# Patient Record
Sex: MALE | Race: WHITE | NOT HISPANIC OR LATINO | Employment: FULL TIME | URBAN - METROPOLITAN AREA
[De-identification: names, ages, dates, MRNs, and addresses within clinical notes are randomized per-mention and may not be internally consistent; named-entity substitution may affect disease eponyms.]

---

## 2017-05-15 ENCOUNTER — ALLSCRIPTS OFFICE VISIT (OUTPATIENT)
Dept: OTHER | Facility: OTHER | Age: 52
End: 2017-05-15

## 2017-10-16 ENCOUNTER — GENERIC CONVERSION - ENCOUNTER (OUTPATIENT)
Dept: OTHER | Facility: OTHER | Age: 52
End: 2017-10-16

## 2017-11-20 ENCOUNTER — GENERIC CONVERSION - ENCOUNTER (OUTPATIENT)
Dept: OTHER | Facility: OTHER | Age: 52
End: 2017-11-20

## 2017-11-29 ENCOUNTER — ALLSCRIPTS OFFICE VISIT (OUTPATIENT)
Dept: OTHER | Facility: OTHER | Age: 52
End: 2017-11-29

## 2017-11-30 NOTE — PROGRESS NOTES
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) (C44 310)   3  Esophageal reflux (530 81) (K21 9)   4  History of allergy (V15 09) (Z88 9)    Plan  Esophageal reflux    · Esomeprazole Magnesium 40 MG Oral Capsule Delayed Release (NexIUM);take 1 capsule daily  History of allergy    · From  Allegra 180 MG TABS  To HM Fexofenadine HCl - 180 MG Oral TabletTAKE 1 TABLET DAILY    Discussion/Summary  Surgical Clearance: He is at a LOW risk from a cardiovascular standpoint at this time without any additional cardiac testing  Reevaluation needed, if he should present with symptoms prior to surgery/procedure  Patient at low risk for cardiopulmonary event  Okay to proceed with surgery  Form completed  Chief Complaint  pt presents for a Codementor  pt reports having surg on 12/6/2016  pt reports doing well  History of Present Illness  Pre-Op Visit (Brief): The patient is being seen for a preoperative visit  Surgical Risk Assessment:  Prior Anesthesia: He had prior anesthesia-- and-- no prior adverse reaction to general anesthesia  Exercise Capacity: able to walk four blocks without symptoms-- and-- able to walk two flights of stairs without symptoms  Lifestyle Factors: denies tobacco use and denies illegal drug use  Symptoms: no easy bleeding,-- no easy bruising,-- no frequent nosebleeds,-- no chest pain,-- no cough,-- no dyspnea,-- no edema,-- no palpitations-- and-- no wheezing  Living Situation: home is secure and supportive and no post-op concerns with his living situation  HPI: Patient is here for preop clearance requested by Dr Vasquez Jersey 0 for basal cell cancer left nasal bridge near lower eyelid  This will be performed on the 6th of December  Patient otherwise feeling fine  Review of Systems   Constitutional: No fever or chills, feels well, no tiredness, no recent weight gain or weight loss    Eyes: No complaints of eye pain, no red eyes, no discharge from eyes, no itchy eyes  ENT: no complaints of earache, no hearing loss, no nosebleeds, no nasal discharge, no sore throat, no hoarseness  Cardiovascular: No complaints of slow heart rate, no fast heart rate, no chest pain, no palpitations, no leg claudication, no lower extremity  Respiratory: No complaints of shortness of breath, no wheezing, no cough, no SOB on exertion, no orthopnea or PND  Gastrointestinal: No complaints of abdominal pain, no constipation, no nausea or vomiting, no diarrhea or bloody stools  Genitourinary: No complaints of dysuria, no incontinence, no hesitancy, no nocturia, no genital lesion, no testicular pain  Musculoskeletal: No complaints of arthralgia, no myalgias, no joint swelling or stiffness, no limb pain or swelling  Integumentary: as noted in HPI  Neurological: No compliants of headache, no confusion, no convulsions, no numbness or tingling, no dizziness or fainting, no limb weakness, no difficulty walking  Psychiatric: Is not suicidal, no sleep disturbances, no anxiety or depression, no change in personality, no emotional problems  Endocrine: No complaints of proptosis, no hot flashes, no muscle weakness, no erectile dysfunction, no deepening of the voice, no feelings of weakness  Hematologic/Lymphatic: No complaints of swollen glands, no swollen glands in the neck, does not bleed easily, no easy bruising  Active Problems  1  Acute bronchitis (466 0) (J20 9)   2  Acute sinusitis (461 9) (J01 90)   3  Cervical pain (neck) (723 1) (M54 2)   4  Esophageal reflux (530 81) (K21 9)   5  History of allergy (V15 09) (Z88 9)   6  Hyperlipidemia (272 4) (E78 5)   7  Skin rash (782 1) (R21)   8  Upper respiratory infection (465 9) (J06 9)   9  Vitamin D deficiency (268 9) (E55 9)    Past Medical History   · History of Colon, diverticulosis (562 10) (K57 30)    The active problems and past medical history were reviewed and updated today        Surgical History   · History of Hemorrhoidectomy    The surgical history was reviewed and updated today  Family History  Family History    · Family history of Cancer    The family history was reviewed and updated today  Social History     · Being A Social Drinker   · Former smoker (W12 48) (R92 040)  The social history was reviewed and updated today  The social history was reviewed and is unchanged  Current Meds   1  Allegra 180 MG TABS; Therapy: (Recorded:16Jan2013) to Recorded   2  Esomeprazole Magnesium 40 MG Oral Capsule Delayed Release; take 1 capsule daily; Therapy: 25BQK0593 to (Evaluate:43Gxf6026)  Requested for: 18KSZ5612; Last Rx:01Nov2016 Ordered   3  Viagra 100 MG Oral Tablet; TAKE 1 TABLET DAILY 1 HOUR BEFORE NEEDED; Therapy: 74ABB4875 to (Evaluate:20Apr2016)  Requested for: 75LVS4499; Last Rx:31Mar2016 Ordered   4  Vitamin D 1000 UNIT Oral Tablet; Therapy: (Recorded:16Jan2013) to Recorded    The medication list was reviewed and updated today  Allergies  1  Lamisil CREA    Vitals   Recorded: 84VXV8144 03:46PM   Temperature 97 F   Heart Rate 78   Respiration 14   Systolic 824, LUE, Sitting   Diastolic 70, LUE, Sitting   Height 5 ft 10 in   Weight 227 lb    BMI Calculated 32 57   BSA Calculated 2 2       Physical Exam   Constitutional  General appearance: No acute distress, well appearing and well nourished  Eyes  Conjunctiva and lids: No swelling, erythema, or discharge  Pupils and irises: Equal, round and reactive to light  Ears, Nose, Mouth, and Throat  External inspection of ears and nose: Normal    Otoscopic examination: Tympanic membrance translucent with normal light reflex  Canals patent without erythema  Oropharynx: Normal with no erythema, edema, exudate or lesions  Pulmonary  Respiratory effort: No increased work of breathing or signs of respiratory distress  Auscultation of lungs: Clear to auscultation  Cardiovascular  Palpation of heart: Normal PMI, no thrills     Auscultation of heart: Normal rate and rhythm, normal S1 and S2, without murmurs  Examination of extremities for edema and/or varicosities: Normal    Abdomen  Abdomen: Non-tender, no masses  Liver and spleen: No hepatomegaly or splenomegaly  Lymphatic  Palpation of lymph nodes in neck: No lymphadenopathy  Musculoskeletal  Gait and station: Normal    Digits and nails: Normal without clubbing or cyanosis  Inspection/palpation of joints, bones, and muscles: Normal    Skin  Skin and subcutaneous tissue: Abnormal  -- Status post biopsy left nasal bridge/face near lower eyelid medial aspect  Neurologic  Cranial nerves: Cranial nerves 2-12 intact  Reflexes: 2+ and symmetric  Sensation: No sensory loss  Psychiatric  Orientation to person, place and time: Normal    Mood and affect: Normal        Results/Data  PHQ-2 Adult Depression Screening 29Nov2017 03:50PM User, s     Test Name Result Flag Reference   PHQ-2 Adult Depression Score 0       Over the last two weeks, how often have you been bothered by any of the following problems? Little interest or pleasure in doing things: Not at all - 0 Feeling down, depressed, or hopeless: Not at all - 0   PHQ-2 Adult Depression Screening Negative         End of Encounter Meds    1  Esomeprazole Magnesium 40 MG Oral Capsule Delayed Release (NexIUM); take 1 capsule daily; Therapy: 59WBQ9972 to (Evaluate:28Jan2018)  Requested for: 08ENS3012; Last Rx:29Nov2017 Ordered    2  Viagra 100 MG Oral Tablet; TAKE 1 TABLET DAILY 1 HOUR BEFORE NEEDED; Therapy: 50CFN5273 to (Evaluate:20Apr2016)  Requested for: 22CMK5130; Last Rx:31Mar2016 Ordered    3  HM Fexofenadine HCl - 180 MG Oral Tablet; TAKE 1 TABLET DAILY; Last Rx:29Nov2017 Ordered    4  Vitamin D 1000 UNIT Oral Tablet;  Therapy: (Recorded:16Jan2013) to Recorded    Signatures   Electronically signed by : Vida Sprague DO; Nov 29 2017  4:28PM EST                       (Author)

## 2017-12-13 ENCOUNTER — GENERIC CONVERSION - ENCOUNTER (OUTPATIENT)
Dept: FAMILY MEDICINE CLINIC | Facility: CLINIC | Age: 52
End: 2017-12-13

## 2018-01-08 ENCOUNTER — ALLSCRIPTS OFFICE VISIT (OUTPATIENT)
Dept: OTHER | Facility: OTHER | Age: 53
End: 2018-01-08

## 2018-01-09 NOTE — PROGRESS NOTES
Assessment   1  URTI (acute upper respiratory infection) (465 9) (J06 9)   2  Acute bronchitis (466 0) (J20 9)    Plan   Acute bronchitis, URTI (acute upper respiratory infection)    · Azithromycin 250 MG Oral Tablet; TAKE 2 TABLETS ON DAY 1 THEN TAKE 1    TABLET A DAY FOR 4 DAYS   · Cheratussin -10 MG/5ML Oral Syrup; TAKE 5 ML EVERY 4 HOURS AS    NEEDED   · Follow Up if Not Better Evaluation and Treatment  Follow-up  Status: Complete  Done:    04IKD2212 04:27PM    Chief Complaint   pt states he has a upper respiratory infection with coughing, shortness of breath, and chest burning  History of Present Illness   HPI: Patient here with cough, shortness of breath and chest discomfort x1 day  Patient with sick contact of wife  No nausea vomiting  Mild phlegm  Mild rhinorrhea / nasal congestion  No sore throat  no fever chills noticed  no treatment  Review of Systems        Constitutional: no fever or chills, feels well, no tiredness, no recent weight loss or weight gain  ENT: as noted in HPI  Cardiovascular: no complaints of slow or fast heart rate, no chest pain, no palpitations, no leg claudication or lower extremity edema  Respiratory: as noted in HPI  Gastrointestinal: no complaints of abdominal pain, no constipation, no nausea or vomiting, no diarrhea or bloody stools  Genitourinary: no complaints of dysuria or incontinence, no hesitancy, no nocturia, no genital lesion, no inadequacy of penile erection  Musculoskeletal: no complaints of arthralgia, no myalgia, no joint swelling or stiffness, no limb pain or swelling  Integumentary: as noted in HPI  Neurological: no complaints of headache, no confusion, no numbness or tingling, no dizziness or fainting  Active Problems   1  Acute bronchitis (466 0) (J20 9)   2  Acute sinusitis (461 9) (J01 90)   3  Cervical pain (neck) (723 1) (M54 2)   4  Esophageal reflux (530 81) (K21 9)   5   Facial basal cell cancer (173 31) (C44 310)   6  History of allergy (V15 09) (Z88 9)   7  Hyperlipidemia (272 4) (E78 5)   8  Preop examination (V72 84) (Z01 818)   9  Skin rash (782 1) (R21)   10  URTI (acute upper respiratory infection) (465 9) (J06 9)   11  Vitamin D deficiency (268 9) (E55 9)    Past Medical History   1  History of Colon, diverticulosis (562 10) (K57 30)  Active Problems And Past Medical History Reviewed: The active problems and past medical history were reviewed and updated today  Family History   Mother    1  No pertinent family history  Family History    2  Family history of Cancer    Social History    · Being A Social Drinker   · Former smoker (S43 88) (J59 296)    Surgical History   1  History of Hemorrhoidectomy    Current Meds    1  Esomeprazole Magnesium 40 MG Oral Capsule Delayed Release; take 1 capsule daily; Therapy: 61UMA7736 to (Evaluate:28Jan2018)  Requested for: 89OTT9860; Last     Rx:29Nov2017 Ordered   2  HM Fexofenadine HCl - 180 MG Oral Tablet; TAKE 1 TABLET DAILY; Last Rx:29Nov2017     Ordered   3  Viagra 100 MG Oral Tablet; TAKE 1 TABLET DAILY 1 HOUR BEFORE NEEDED; Therapy: 93JXU9676 to (Evaluate:20Apr2016)  Requested for: 77OVI9905; Last     Rx:31Mar2016 Ordered   4  Vitamin D 1000 UNIT Oral Tablet; Therapy: (Recorded:16Jan2013) to Recorded     The medication list was reviewed and updated today  Allergies   1  Lamisil CREA    Vitals    Recorded: 28SSC8381 77:80FZ   Systolic 669   Diastolic 90   Height 5 ft 10 in   Weight 229 lb 0 2 oz   BMI Calculated 32 86   BSA Calculated 2 21     Physical Exam        Constitutional      General appearance: No acute distress, well appearing and well nourished  Eyes      Conjunctiva and lids: No swelling, erythema, or discharge  Pupils and irises: Equal, round and reactive to light         Ears, Nose, Mouth, and Throat      External inspection of ears and nose: Normal        Otoscopic examination: Tympanic membrance translucent with normal light reflex  Canals patent without erythema  Nasal mucosa, septum, and turbinates: Normal without edema or erythema  Oropharynx: Abnormal  -- Red, postnasal drip  Pulmonary      Respiratory effort: No increased work of breathing or signs of respiratory distress  Auscultation of lungs: Clear to auscultation, equal breath sounds bilaterally, no wheezes, no rales, no rhonci  Cardiovascular      Palpation of heart: Normal PMI, no thrills  Auscultation of heart: Normal rate and rhythm, normal S1 and S2, without murmurs  Examination of extremities for edema and/or varicosities: Normal        Carotid pulses: Normal        Lymphatic      Palpation of lymph nodes in neck: No lymphadenopathy  Musculoskeletal      Gait and station: Normal        Digits and nails: Normal without clubbing or cyanosis  Inspection/palpation of joints, bones, and muscles: Normal        Skin      Skin and subcutaneous tissue: Abnormal  -- rash b/l axilla        Psychiatric      Orientation to person, place and time: Normal        Mood and affect: Normal           Signatures    Electronically signed by : Jh Goins DO; Jan 8 2018  4:27PM EST                       (Author)

## 2018-01-11 NOTE — CONSULTS
Chief Complaint  pt presents for a med clear  pt reports having surg on 12/6/2016  pt reports doing well  History of Present Illness  Pre-Op Visit (Brief): The patient is being seen for a preoperative visit  Surgical Risk Assessment:   Prior Anesthesia: He had prior anesthesia and no prior adverse reaction to general anesthesia  Exercise Capacity: able to walk four blocks without symptoms and able to walk two flights of stairs without symptoms  Lifestyle Factors: denies tobacco use and denies illegal drug use  Symptoms: no easy bleeding, no easy bruising, no frequent nosebleeds, no chest pain, no cough, no dyspnea, no edema, no palpitations and no wheezing  Living Situation: home is secure and supportive and no post-op concerns with his living situation  HPI: Patient is here for preop clearance requested by Dr Aleja Ovalles 0 for basal cell cancer left nasal bridge near lower eyelid  This will be performed on the 6th of December  Patient otherwise feeling fine  Review of Systems    Constitutional: No fever or chills, feels well, no tiredness, no recent weight gain or weight loss  Eyes: No complaints of eye pain, no red eyes, no discharge from eyes, no itchy eyes  ENT: no complaints of earache, no hearing loss, no nosebleeds, no nasal discharge, no sore throat, no hoarseness  Cardiovascular: No complaints of slow heart rate, no fast heart rate, no chest pain, no palpitations, no leg claudication, no lower extremity  Respiratory: No complaints of shortness of breath, no wheezing, no cough, no SOB on exertion, no orthopnea or PND  Gastrointestinal: No complaints of abdominal pain, no constipation, no nausea or vomiting, no diarrhea or bloody stools  Genitourinary: No complaints of dysuria, no incontinence, no hesitancy, no nocturia, no genital lesion, no testicular pain  Musculoskeletal: No complaints of arthralgia, no myalgias, no joint swelling or stiffness, no limb pain or swelling  Integumentary: as noted in HPI  Neurological: No compliants of headache, no confusion, no convulsions, no numbness or tingling, no dizziness or fainting, no limb weakness, no difficulty walking  Psychiatric: Is not suicidal, no sleep disturbances, no anxiety or depression, no change in personality, no emotional problems  Endocrine: No complaints of proptosis, no hot flashes, no muscle weakness, no erectile dysfunction, no deepening of the voice, no feelings of weakness  Hematologic/Lymphatic: No complaints of swollen glands, no swollen glands in the neck, does not bleed easily, no easy bruising  Active Problems    1  Acute bronchitis (466 0) (J20 9)   2  Acute sinusitis (461 9) (J01 90)   3  Cervical pain (neck) (723 1) (M54 2)   4  Esophageal reflux (530 81) (K21 9)   5  History of allergy (V15 09) (Z88 9)   6  Hyperlipidemia (272 4) (E78 5)   7  Skin rash (782 1) (R21)   8  Upper respiratory infection (465 9) (J06 9)   9  Vitamin D deficiency (268 9) (E55 9)    Past Medical History    · History of Colon, diverticulosis (562 10) (K57 30)    The active problems and past medical history were reviewed and updated today  Surgical History    · History of Hemorrhoidectomy    The surgical history was reviewed and updated today  Family History    · Family history of Cancer    The family history was reviewed and updated today  Social History    · Being A Social Drinker   · Former smoker (Y18 83) (U50 832)  The social history was reviewed and updated today  The social history was reviewed and is unchanged  Current Meds   1  Allegra 180 MG TABS; Therapy: (Recorded:16Jan2013) to Recorded   2  Esomeprazole Magnesium 40 MG Oral Capsule Delayed Release; take 1 capsule daily; Therapy: 94NJR9818 to (Evaluate:73Fpn8949)  Requested for: 00BVV4987; Last   Rx:01Nov2016 Ordered   3  Viagra 100 MG Oral Tablet; TAKE 1 TABLET DAILY 1 HOUR BEFORE NEEDED;    Therapy: 84KOA3958 to (Evaluate:20Apr2016) Requested for: 13DUI4746; Last   Rx:31Mar2016 Ordered   4  Vitamin D 1000 UNIT Oral Tablet; Therapy: (Recorded:16Jan2013) to Recorded    The medication list was reviewed and updated today  Allergies    1  Lamisil CREA    Vitals  Signs    Temperature: 97 F  Heart Rate: 78  Respiration: 14  Systolic: 965, LUE, Sitting  Diastolic: 70, LUE, Sitting  Height: 5 ft 10 in  Weight: 227 lb   BMI Calculated: 32 57  BSA Calculated: 2 2    Physical Exam    Constitutional   General appearance: No acute distress, well appearing and well nourished  Eyes   Conjunctiva and lids: No swelling, erythema, or discharge  Pupils and irises: Equal, round and reactive to light  Ears, Nose, Mouth, and Throat   External inspection of ears and nose: Normal     Otoscopic examination: Tympanic membrance translucent with normal light reflex  Canals patent without erythema  Oropharynx: Normal with no erythema, edema, exudate or lesions  Pulmonary   Respiratory effort: No increased work of breathing or signs of respiratory distress  Auscultation of lungs: Clear to auscultation  Cardiovascular   Palpation of heart: Normal PMI, no thrills  Auscultation of heart: Normal rate and rhythm, normal S1 and S2, without murmurs  Examination of extremities for edema and/or varicosities: Normal     Abdomen   Abdomen: Non-tender, no masses  Liver and spleen: No hepatomegaly or splenomegaly  Lymphatic   Palpation of lymph nodes in neck: No lymphadenopathy  Musculoskeletal   Gait and station: Normal     Digits and nails: Normal without clubbing or cyanosis  Inspection/palpation of joints, bones, and muscles: Normal     Skin   Skin and subcutaneous tissue: Abnormal   Status post biopsy left nasal bridge/face near lower eyelid medial aspect  Neurologic   Cranial nerves: Cranial nerves 2-12 intact  Reflexes: 2+ and symmetric  Sensation: No sensory loss      Psychiatric   Orientation to person, place and time: Normal  Mood and affect: Normal        Results/Data  PHQ-2 Adult Depression Screening 29Nov2017 03:50PM User, Georgie     Test Name Result Flag Reference   PHQ-2 Adult Depression Score 0     Over the last two weeks, how often have you been bothered by any of the following problems? 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) (C44 310)   3  Esophageal reflux (530 81) (K21 9)   4  History of allergy (V15 09) (Z88 9)    Plan  Esophageal reflux    · Esomeprazole Magnesium 40 MG Oral Capsule Delayed Release (NexIUM);  take 1 capsule daily  History of allergy    · From  Allegra 180 MG TABS  To HM Fexofenadine HCl - 180 MG Oral Tablet  TAKE 1 TABLET DAILY    Discussion/Summary  Surgical Clearance: He is at a LOW risk from a cardiovascular standpoint at this time without any additional cardiac testing  Reevaluation needed, if he should present with symptoms prior to surgery/procedure  Patient at low risk for cardiopulmonary event  Okay to proceed with surgery  Form completed  End of Encounter Meds    1  Esomeprazole Magnesium 40 MG Oral Capsule Delayed Release (NexIUM); take 1   capsule daily; Therapy: 41DZY1408 to (Evaluate:28Jan2018)  Requested for: 23ORK5552; Last   Rx:29Nov2017 Ordered    2  Viagra 100 MG Oral Tablet; TAKE 1 TABLET DAILY 1 HOUR BEFORE NEEDED; Therapy: 08DSK3000 to (Evaluate:20Apr2016)  Requested for: 24SDU3161; Last   Rx:31Mar2016 Ordered    3  HM Fexofenadine HCl - 180 MG Oral Tablet; TAKE 1 TABLET DAILY; Last Rx:29Nov2017   Ordered    4  Vitamin D 1000 UNIT Oral Tablet;    Therapy: (Recorded:16Jan2013) to Recorded    Signatures   Electronically signed by : Ever Payton DO; Nov 29 2017  4:28PM EST                       (Author)

## 2018-01-14 VITALS
HEIGHT: 70 IN | HEART RATE: 78 BPM | RESPIRATION RATE: 14 BRPM | TEMPERATURE: 97 F | WEIGHT: 227 LBS | DIASTOLIC BLOOD PRESSURE: 70 MMHG | SYSTOLIC BLOOD PRESSURE: 128 MMHG | BODY MASS INDEX: 32.5 KG/M2

## 2018-01-15 VITALS
WEIGHT: 235 LBS | DIASTOLIC BLOOD PRESSURE: 80 MMHG | BODY MASS INDEX: 33.64 KG/M2 | HEIGHT: 70 IN | SYSTOLIC BLOOD PRESSURE: 120 MMHG

## 2018-01-23 VITALS
DIASTOLIC BLOOD PRESSURE: 90 MMHG | WEIGHT: 229.01 LBS | HEIGHT: 70 IN | SYSTOLIC BLOOD PRESSURE: 140 MMHG | BODY MASS INDEX: 32.79 KG/M2

## 2018-12-03 ENCOUNTER — OFFICE VISIT (OUTPATIENT)
Dept: FAMILY MEDICINE CLINIC | Facility: CLINIC | Age: 53
End: 2018-12-03
Payer: COMMERCIAL

## 2018-12-03 VITALS
HEIGHT: 70 IN | SYSTOLIC BLOOD PRESSURE: 118 MMHG | BODY MASS INDEX: 32.35 KG/M2 | DIASTOLIC BLOOD PRESSURE: 70 MMHG | WEIGHT: 226 LBS | TEMPERATURE: 97.2 F

## 2018-12-03 DIAGNOSIS — J00 ACUTE NASOPHARYNGITIS: Primary | ICD-10-CM

## 2018-12-03 PROBLEM — C44.310 FACIAL BASAL CELL CANCER: Status: ACTIVE | Noted: 2017-11-29

## 2018-12-03 PROBLEM — J06.9 UPPER RESPIRATORY INFECTION: Status: ACTIVE | Noted: 2018-12-03

## 2018-12-03 PROCEDURE — 99213 OFFICE O/P EST LOW 20 MIN: CPT | Performed by: FAMILY MEDICINE

## 2018-12-03 PROCEDURE — 3008F BODY MASS INDEX DOCD: CPT | Performed by: FAMILY MEDICINE

## 2018-12-03 RX ORDER — AZITHROMYCIN 250 MG/1
TABLET, FILM COATED ORAL
Qty: 6 TABLET | Refills: 0 | Status: SHIPPED | OUTPATIENT
Start: 2018-12-03 | End: 2018-12-07

## 2018-12-03 RX ORDER — MULTIVIT-MIN/IRON/FOLIC ACID/K 18-600-40
2000 CAPSULE ORAL DAILY
COMMUNITY

## 2018-12-03 RX ORDER — FEXOFENADINE HCL 180 MG/1
180 TABLET ORAL DAILY
COMMUNITY

## 2018-12-03 NOTE — LETTER
December 3, 2018     Patient: Akshat Arora   YOB: 1965   Date of Visit: 12/3/2018       To Whom it May Concern:    Lori Alonso is under my professional care  He was seen in my office on 12/3/2018  He may return to work on 12/05/2018  If you have any questions or concerns, please don't hesitate to call           Sincerely,          Daniel Rater, DO        CC: No Recipients

## 2018-12-03 NOTE — PROGRESS NOTES
Assessment/Plan:  Patient will start Sudafed during the day  Z-Donald as directed  Note for patient return to work Wednesday  Diagnoses and all orders for this visit:    Acute nasopharyngitis  -     azithromycin (ZITHROMAX) 250 mg tablet; Take 2 tablets today then 1 tablet daily x 4 days    Other orders  -     cholecalciferol (VITAMIN D3) 1,000 units tablet; Take by mouth  -     fexofenadine (ALLEGRA) 180 MG tablet; Take 180 mg by mouth daily  -     esomeprazole (NexIUM) 20 mg capsule; Take 20 mg by mouth every morning before breakfast          Subjective:      Patient ID: Rika Miller is a 46 y o  male  Patient is here with URI symptoms including shortness of breath cough fatigue and sinus issues including headache which began approximately 2 days ago  No vomiting or diarrhea  No fever  Patient is having sweats  Patient has been achy also  Patient is using NyQuil  Small amount of sputum production noted  Shortness of Breath   Associated symptoms include headaches and rhinorrhea  Pertinent negatives include no sore throat  Cough   Associated symptoms include headaches, rhinorrhea and shortness of breath  Pertinent negatives include no sore throat  Fatigue   Associated symptoms include arthralgias, congestion, coughing, fatigue and headaches  Pertinent negatives include no sore throat  Sinus Problem   Associated symptoms include congestion, coughing, headaches and shortness of breath  Pertinent negatives include no sore throat  The following portions of the patient's history were reviewed and updated as appropriate: allergies, current medications, past family history, past medical history, past social history, past surgical history and problem list     Review of Systems   Constitutional: Positive for fatigue  HENT: Positive for congestion, rhinorrhea and sinus pain  Negative for sore throat  Respiratory: Positive for cough and shortness of breath      Musculoskeletal: Positive for arthralgias  Neurological: Positive for headaches  Objective:      /70 (BP Location: Right arm, Patient Position: Sitting, Cuff Size: Large)   Temp (!) 97 2 °F (36 2 °C) (Tympanic)   Ht 5' 10" (1 778 m)   Wt 103 kg (226 lb)   BMI 32 43 kg/m²          Physical Exam   Constitutional: He is oriented to person, place, and time  He appears well-developed and well-nourished  No distress  HENT:   Head: Normocephalic  Right Ear: External ear normal    Left Ear: External ear normal    Mouth/Throat: Oropharyngeal exudate present  Eyes: Pupils are equal, round, and reactive to light  EOM are normal  Right eye exhibits no discharge  Left eye exhibits no discharge  No scleral icterus  Neck: Normal range of motion  Neck supple  No thyromegaly present  Cardiovascular: Normal rate, regular rhythm, normal heart sounds and intact distal pulses  Exam reveals no gallop and no friction rub  No murmur heard  Pulmonary/Chest: Effort normal and breath sounds normal  No respiratory distress  He has no wheezes  He has no rales  He exhibits no tenderness  Abdominal: Soft  Bowel sounds are normal  He exhibits no distension  There is no tenderness  There is no rebound and no guarding  Musculoskeletal: Normal range of motion  He exhibits no edema or tenderness  Lymphadenopathy:     He has no cervical adenopathy  Neurological: He is oriented to person, place, and time  No cranial nerve deficit  He exhibits normal muscle tone  Coordination normal    Skin: Skin is warm and dry  No rash noted  He is not diaphoretic  No erythema  No pallor  Psychiatric: He has a normal mood and affect  His behavior is normal  Judgment and thought content normal    Nursing note and vitals reviewed

## 2019-01-08 ENCOUNTER — OFFICE VISIT (OUTPATIENT)
Dept: FAMILY MEDICINE CLINIC | Facility: CLINIC | Age: 54
End: 2019-01-08
Payer: COMMERCIAL

## 2019-01-08 VITALS
TEMPERATURE: 98.1 F | DIASTOLIC BLOOD PRESSURE: 72 MMHG | SYSTOLIC BLOOD PRESSURE: 126 MMHG | BODY MASS INDEX: 32.21 KG/M2 | HEIGHT: 70 IN | WEIGHT: 225 LBS

## 2019-01-08 DIAGNOSIS — Z12.11 SCREENING FOR COLON CANCER: ICD-10-CM

## 2019-01-08 DIAGNOSIS — J00 ACUTE NASOPHARYNGITIS: Primary | ICD-10-CM

## 2019-01-08 PROCEDURE — 99213 OFFICE O/P EST LOW 20 MIN: CPT | Performed by: FAMILY MEDICINE

## 2019-01-08 RX ORDER — AMOXICILLIN AND CLAVULANATE POTASSIUM 875; 125 MG/1; MG/1
1 TABLET, FILM COATED ORAL EVERY 12 HOURS SCHEDULED
Qty: 14 TABLET | Refills: 0 | Status: SHIPPED | OUTPATIENT
Start: 2019-01-08 | End: 2019-01-15

## 2019-01-08 NOTE — PROGRESS NOTES
Assessment/Plan:      Diagnoses and all orders for this visit:    Acute nasopharyngitis  -     amoxicillin-clavulanate (AUGMENTIN) 875-125 mg per tablet; Take 1 tablet by mouth every 12 (twelve) hours for 7 days    Screening for colon cancer    Other orders  -     Cancel: Ambulatory referral to Gastroenterology; Future  -     Cancel: Occult Blood, Fecal Immunochemical; Future  -     psyllium (METAMUCIL) 58 6 % powder; Take 1 packet by mouth 3 (three) times a day          Subjective:      Patient ID: Rika Miller is a 46 y o  male  Patient is here with cough, sore throat, sputum production over the 3 days  Patient also notes some decreased hearing out of left ear  Patient also with headaches  Patient with rhinorrhea  No nausea vomiting or diarrhea  Symptoms from December visit have resolved entirely  Patient with some sputum production  No otorrhea or vertigo  The patient is using ibuprofen and NyQuil  The following portions of the patient's history were reviewed and updated as appropriate: allergies, current medications, past family history, past medical history, past social history, past surgical history and problem list     Review of Systems   Constitutional: Positive for fever  Negative for chills  HENT: Positive for congestion, postnasal drip, rhinorrhea and sore throat  Eyes: Negative  Respiratory: Positive for cough  Cardiovascular: Negative  Gastrointestinal: Negative  Endocrine: Negative  Genitourinary: Negative  Musculoskeletal: Negative  Skin: Negative  Allergic/Immunologic: Negative  Neurological: Negative  Hematological: Negative  Psychiatric/Behavioral: Negative  Objective:      /72 (BP Location: Right arm, Patient Position: Sitting)   Temp 98 1 °F (36 7 °C)   Ht 5' 10" (1 778 m)   Wt 102 kg (225 lb)   BMI 32 28 kg/m²          Physical Exam   Constitutional: He is oriented to person, place, and time   He appears well-developed and well-nourished  No distress  HENT:   Head: Normocephalic  Right Ear: External ear normal    Left Ear: External ear normal    Mouth/Throat: Oropharyngeal exudate present  Eyes: Pupils are equal, round, and reactive to light  EOM are normal  Right eye exhibits no discharge  Left eye exhibits no discharge  No scleral icterus  Neck: Normal range of motion  Neck supple  No thyromegaly present  Cardiovascular: Normal rate, regular rhythm, normal heart sounds and intact distal pulses  Exam reveals no gallop and no friction rub  No murmur heard  Pulmonary/Chest: Effort normal and breath sounds normal  No respiratory distress  He has no wheezes  He has no rales  He exhibits no tenderness  Abdominal: Soft  Bowel sounds are normal  He exhibits no distension  There is no tenderness  There is no rebound and no guarding  Musculoskeletal: Normal range of motion  He exhibits no edema, tenderness or deformity  Lymphadenopathy:     He has cervical adenopathy  Neurological: He is oriented to person, place, and time  No cranial nerve deficit  He exhibits normal muscle tone  Coordination normal    Skin: Skin is warm and dry  No rash noted  He is not diaphoretic  No erythema  No pallor  Psychiatric: He has a normal mood and affect   His behavior is normal  Judgment and thought content normal

## 2019-03-01 ENCOUNTER — OFFICE VISIT (OUTPATIENT)
Dept: FAMILY MEDICINE CLINIC | Facility: CLINIC | Age: 54
End: 2019-03-01
Payer: COMMERCIAL

## 2019-03-01 VITALS
HEART RATE: 58 BPM | TEMPERATURE: 98.7 F | OXYGEN SATURATION: 97 % | BODY MASS INDEX: 32.93 KG/M2 | WEIGHT: 230 LBS | DIASTOLIC BLOOD PRESSURE: 88 MMHG | SYSTOLIC BLOOD PRESSURE: 134 MMHG | HEIGHT: 70 IN

## 2019-03-01 DIAGNOSIS — I25.9 CHEST PAIN DUE TO MYOCARDIAL ISCHEMIA, UNSPECIFIED ISCHEMIC CHEST PAIN TYPE: ICD-10-CM

## 2019-03-01 DIAGNOSIS — R07.89 CHEST DISCOMFORT: Primary | ICD-10-CM

## 2019-03-01 DIAGNOSIS — E78.2 MIXED HYPERLIPIDEMIA: ICD-10-CM

## 2019-03-01 DIAGNOSIS — I25.10 CORONARY ARTERY DISEASE INVOLVING NATIVE CORONARY ARTERY OF NATIVE HEART WITHOUT ANGINA PECTORIS: ICD-10-CM

## 2019-03-01 PROCEDURE — 99214 OFFICE O/P EST MOD 30 MIN: CPT | Performed by: FAMILY MEDICINE

## 2019-03-01 PROCEDURE — 3008F BODY MASS INDEX DOCD: CPT | Performed by: FAMILY MEDICINE

## 2019-03-01 PROCEDURE — 1036F TOBACCO NON-USER: CPT | Performed by: FAMILY MEDICINE

## 2019-03-01 PROCEDURE — 93000 ELECTROCARDIOGRAM COMPLETE: CPT | Performed by: FAMILY MEDICINE

## 2019-03-01 RX ORDER — LORAZEPAM 1 MG/1
0.5 TABLET ORAL EVERY 8 HOURS PRN
Qty: 30 TABLET | Refills: 0 | Status: SHIPPED | OUTPATIENT
Start: 2019-03-01 | End: 2019-04-08

## 2019-03-01 NOTE — PROGRESS NOTES
Assessment/Plan:  Patient will start aspirin 81 mg daily  Patient go for echo Holter and laboratory studies  Follow-up in 2- 3 weeks     Diagnoses and all orders for this visit:    Chest discomfort  -     POCT ECG  -     CBC and differential; Future  -     Comprehensive metabolic panel; Future  -     Lipid panel; Future  -     TSH, 3rd generation with Free T4 reflex; Future  -     Metanephrine, Fractionated Plasma Free; Future  -     Echo complete with contrast if indicated; Future  -     Holter monitor - 24 hour; Future    Coronary artery disease involving native coronary artery of native heart without angina pectoris  -     CBC and differential; Future  -     Comprehensive metabolic panel; Future  -     Lipid panel; Future  -     TSH, 3rd generation with Free T4 reflex; Future  -     Metanephrine, Fractionated Plasma Free; Future  -     Echo complete with contrast if indicated; Future  -     Holter monitor - 24 hour; Future    Chest pain due to myocardial ischemia, unspecified ischemic chest pain type  -     CBC and differential; Future  -     Comprehensive metabolic panel; Future  -     Lipid panel; Future  -     TSH, 3rd generation with Free T4 reflex; Future  -     Metanephrine, Fractionated Plasma Free; Future  -     Echo complete with contrast if indicated; Future  -     Holter monitor - 24 hour; Future  -     Vitamin D 25 hydroxy; Future          Subjective:      Patient ID: Deshaun Frias is a 46 y o  male  Patient is here with surges of energy in his chest which last a 2nd or 2 which have been coming on intermittently over the past 2 weeks  Patient's symptoms can occur at rest   No exertional symptoms  No chest pain pressure shortness of breath diaphoresis or nausea or vomiting associated with this  No fevers or chills  No radiation to the neck arm or back  The patient does smoke marijuana intermittently  No family history of heart disease known    Search feeling resolved by itself in few seconds without any medication  Normal urination defecation  The occasional numbness in the hand  This occurred a few times over the past few months  This did resolve with movement of arm  No neck pain noted  The following portions of the patient's history were reviewed and updated as appropriate: allergies, current medications, past family history, past medical history, past social history, past surgical history and problem list     Review of Systems   Constitutional: Negative  HENT: Negative  Eyes: Negative  Respiratory: Negative  Negative for shortness of breath  Cardiovascular: Negative for chest pain  Gastrointestinal: Negative  Endocrine: Negative  Genitourinary: Negative  Musculoskeletal: Negative  Skin: Negative  Allergic/Immunologic: Negative  Neurological: Positive for numbness  Hematological: Negative  Psychiatric/Behavioral: Negative  Objective:      /88 (BP Location: Right arm, Patient Position: Sitting, Cuff Size: Adult)   Pulse 58   Temp 98 7 °F (37 1 °C) (Tympanic)   Ht 5' 10" (1 778 m)   Wt 104 kg (230 lb)   SpO2 97%   BMI 33 00 kg/m²          Physical Exam   Constitutional: He is oriented to person, place, and time  He appears well-developed and well-nourished  No distress  HENT:   Head: Normocephalic  Right Ear: External ear normal    Left Ear: External ear normal    Mouth/Throat: Oropharynx is clear and moist  No oropharyngeal exudate  Eyes: Pupils are equal, round, and reactive to light  EOM are normal  Right eye exhibits no discharge  Left eye exhibits no discharge  No scleral icterus  Neck: Normal range of motion  Neck supple  No thyromegaly present  Cardiovascular: Normal rate, regular rhythm, normal heart sounds and intact distal pulses  Exam reveals no gallop and no friction rub  No murmur heard  Pulmonary/Chest: Effort normal and breath sounds normal  No respiratory distress  He has no wheezes  He has no rales   He exhibits no tenderness  Musculoskeletal: Normal range of motion  He exhibits no edema or tenderness  Lymphadenopathy:     He has no cervical adenopathy  Neurological: He is oriented to person, place, and time  No cranial nerve deficit  He exhibits normal muscle tone  Coordination normal    Skin: Skin is warm and dry  No rash noted  He is not diaphoretic  No erythema  No pallor  Psychiatric: He has a normal mood and affect   His behavior is normal  Judgment and thought content normal

## 2019-03-21 ENCOUNTER — HOSPITAL ENCOUNTER (OUTPATIENT)
Dept: NON INVASIVE DIAGNOSTICS | Facility: HOSPITAL | Age: 54
Discharge: HOME | End: 2019-03-21
Payer: COMMERCIAL

## 2019-03-21 DIAGNOSIS — I25.10 CORONARY ARTERY DISEASE INVOLVING NATIVE CORONARY ARTERY OF NATIVE HEART WITHOUT ANGINA PECTORIS: ICD-10-CM

## 2019-03-21 DIAGNOSIS — R07.89 CHEST DISCOMFORT: ICD-10-CM

## 2019-03-21 DIAGNOSIS — I25.9 CHEST PAIN DUE TO MYOCARDIAL ISCHEMIA, UNSPECIFIED ISCHEMIC CHEST PAIN TYPE: ICD-10-CM

## 2019-03-21 PROCEDURE — 93225 XTRNL ECG REC<48 HRS REC: CPT

## 2019-03-21 PROCEDURE — 93306 TTE W/DOPPLER COMPLETE: CPT | Performed by: INTERNAL MEDICINE

## 2019-03-21 PROCEDURE — 93306 TTE W/DOPPLER COMPLETE: CPT

## 2019-03-21 PROCEDURE — 93226 XTRNL ECG REC<48 HR SCAN A/R: CPT

## 2019-03-22 ENCOUNTER — TELEPHONE (OUTPATIENT)
Dept: FAMILY MEDICINE CLINIC | Facility: CLINIC | Age: 54
End: 2019-03-22

## 2019-03-22 NOTE — TELEPHONE ENCOUNTER
----- Message from Nathaniel Kim MD sent at 3/22/2019  7:52 AM EDT -----  Please call patient to discuss that echo showed some aortic sclerosis but otherwise was fairly normal and he can discuss more fully at his follow-up with Dr Duff

## 2019-03-22 NOTE — TELEPHONE ENCOUNTER
Please call patient to discuss that labs showed elevated cholesterol and some borderline vitamin D  This is not urgent  He can discuss more fully at his next follow-up with Dr Tyrese Luo  In the meantime he can pursue healthy diet like the Mediterranean diet, exercise, healthy weight as tolerated

## 2019-03-25 PROCEDURE — 93227 XTRNL ECG REC<48 HR R&I: CPT | Performed by: INTERNAL MEDICINE

## 2019-04-08 ENCOUNTER — OFFICE VISIT (OUTPATIENT)
Dept: FAMILY MEDICINE CLINIC | Facility: CLINIC | Age: 54
End: 2019-04-08
Payer: COMMERCIAL

## 2019-04-08 VITALS
HEIGHT: 70 IN | WEIGHT: 225 LBS | SYSTOLIC BLOOD PRESSURE: 122 MMHG | DIASTOLIC BLOOD PRESSURE: 66 MMHG | TEMPERATURE: 98.1 F | BODY MASS INDEX: 32.21 KG/M2

## 2019-04-08 DIAGNOSIS — I20.8 OTHER FORMS OF ANGINA PECTORIS (HCC): ICD-10-CM

## 2019-04-08 DIAGNOSIS — E78.2 MIXED HYPERLIPIDEMIA: Primary | ICD-10-CM

## 2019-04-08 DIAGNOSIS — E55.9 VITAMIN D DEFICIENCY: ICD-10-CM

## 2019-04-08 PROCEDURE — 99214 OFFICE O/P EST MOD 30 MIN: CPT | Performed by: FAMILY MEDICINE

## 2019-04-08 PROCEDURE — 3008F BODY MASS INDEX DOCD: CPT | Performed by: FAMILY MEDICINE

## 2019-04-08 PROCEDURE — 1036F TOBACCO NON-USER: CPT | Performed by: FAMILY MEDICINE

## 2019-08-13 ENCOUNTER — OFFICE VISIT (OUTPATIENT)
Dept: FAMILY MEDICINE CLINIC | Facility: CLINIC | Age: 54
End: 2019-08-13
Payer: COMMERCIAL

## 2019-08-13 VITALS
SYSTOLIC BLOOD PRESSURE: 138 MMHG | TEMPERATURE: 98.1 F | DIASTOLIC BLOOD PRESSURE: 80 MMHG | BODY MASS INDEX: 31.5 KG/M2 | WEIGHT: 220 LBS | HEIGHT: 70 IN

## 2019-08-13 DIAGNOSIS — M77.11 RIGHT LATERAL EPICONDYLITIS: ICD-10-CM

## 2019-08-13 DIAGNOSIS — J06.9 VIRAL UPPER RESPIRATORY TRACT INFECTION: Primary | ICD-10-CM

## 2019-08-13 DIAGNOSIS — B35.3 TINEA PEDIS OF BOTH FEET: ICD-10-CM

## 2019-08-13 PROCEDURE — 1036F TOBACCO NON-USER: CPT | Performed by: FAMILY MEDICINE

## 2019-08-13 PROCEDURE — 99214 OFFICE O/P EST MOD 30 MIN: CPT | Performed by: FAMILY MEDICINE

## 2019-08-13 PROCEDURE — 3008F BODY MASS INDEX DOCD: CPT | Performed by: FAMILY MEDICINE

## 2019-08-13 RX ORDER — MELOXICAM 15 MG/1
15 TABLET ORAL DAILY
Qty: 30 TABLET | Refills: 1 | Status: SHIPPED | OUTPATIENT
Start: 2019-08-13 | End: 2020-12-22

## 2019-08-13 RX ORDER — AZITHROMYCIN 250 MG/1
TABLET, FILM COATED ORAL
Qty: 6 TABLET | Refills: 0 | Status: SHIPPED | OUTPATIENT
Start: 2019-08-13 | End: 2019-08-17

## 2019-08-13 NOTE — PROGRESS NOTES
Assessment/Plan:  Patient use ice as directed along with tennis elbow strap along with meloxicam daily with food  Patient will let us know if no improvement over the next 6 weeks or so  Patient use azithromycin for URI  Patient will use econazole for tinea pedis  Follow-up per routine  Diagnoses and all orders for this visit:    Viral upper respiratory tract infection  -     azithromycin (ZITHROMAX) 250 mg tablet; Take 2 tablets today then 1 tablet daily x 4 days    Right lateral epicondylitis    Tinea pedis of both feet  -     econazole nitrate 1 % cream; Apply topically daily  -     meloxicam (MOBIC) 15 mg tablet; Take 1 tablet (15 mg total) by mouth daily  -     Tennis elbow strap    Other orders  -     CLOTRIMAZOLE EX; Apply topically 2 (two) times a day             Subjective:        Patient ID: Ladon Peabody is a 53 y o  male  Patient is here with rhinorrhea sinus pressure nasal congestion ear pressure since Saturday  Patient has tried Sudafed with some improvement  Patient with productive cough the slight throat throat the  Patient notices some chest pain with coughing only  The patient also had some lightheadedness  Patient is seeing improvement overall with symptoms  The patient with athlete's foot between toes the bilaterally  Patient using clotrimazole for the past month  Patient also with right elbow pain for the past few years  Patient has difficulty with grasping things due to pain  No trauma noted  The following portions of the patient's history were reviewed and updated as appropriate: allergies, current medications, past family history, past medical history, past social history, past surgical history and problem list       Review of Systems   Constitutional: Negative  Negative for fever  HENT: Positive for congestion, ear pain, postnasal drip, sinus pressure, sinus pain and sore throat  Eyes: Negative  Respiratory: Positive for cough      Cardiovascular: Negative  Gastrointestinal: Negative  Endocrine: Negative  Genitourinary: Negative  Musculoskeletal: Positive for arthralgias  Skin: Positive for rash  Allergic/Immunologic: Negative  Neurological: Negative  Hematological: Negative  Psychiatric/Behavioral: Negative  Objective:      BMI Counseling: Body mass index is 31 57 kg/m²  Discussed the patient's BMI with him  The BMI is above average  BMI counseling and education was provided to the patient  Nutrition recommendations include reducing portion sizes and decreasing overall calorie intake  Depression Screening Follow-up Plan: Patient's depression screening was positive with a PHQ-2 score of   Their PHQ-9 score was   Patient assessed for underlying major depression  They have no active suicidal ideations  Brief counseling provided and recommend additional follow-up/re-evaluation next office visit  /80 (BP Location: Right arm, Patient Position: Sitting, Cuff Size: Adult)   Temp 98 1 °F (36 7 °C) (Tympanic)   Ht 5' 10" (1 778 m)   Wt 99 8 kg (220 lb)   BMI 31 57 kg/m²          Physical Exam   Constitutional: He is oriented to person, place, and time  He appears well-developed and well-nourished  No distress  HENT:   Head: Normocephalic  Right Ear: External ear normal    Left Ear: External ear normal    Mouth/Throat: Oropharyngeal exudate present  Eyes: Pupils are equal, round, and reactive to light  EOM are normal  Right eye exhibits no discharge  Left eye exhibits no discharge  No scleral icterus  Neck: Normal range of motion  Neck supple  No thyromegaly present  Cardiovascular: Normal rate, regular rhythm, normal heart sounds and intact distal pulses  Exam reveals no gallop and no friction rub  No murmur heard  Pulmonary/Chest: Effort normal and breath sounds normal  No respiratory distress  He has no wheezes  He has no rales  He exhibits no tenderness  Abdominal: Soft   Bowel sounds are normal  He exhibits no distension  There is no tenderness  There is no rebound and no guarding  Musculoskeletal: Normal range of motion  He exhibits tenderness  He exhibits no edema  Pain with palpation over right lateral epicondylar pain  Lymphadenopathy:     He has no cervical adenopathy  Neurological: He is oriented to person, place, and time  No cranial nerve deficit  He exhibits normal muscle tone  Coordination normal    Skin: Skin is warm and dry  Rash noted  He is not diaphoretic  No erythema  No pallor  Tinea pedis bilaterally   Psychiatric: He has a normal mood and affect  His behavior is normal  Judgment and thought content normal    Nursing note and vitals reviewed

## 2020-12-06 ENCOUNTER — OFFICE VISIT (OUTPATIENT)
Dept: URGENT CARE | Facility: MEDICAL CENTER | Age: 55
End: 2020-12-06
Payer: COMMERCIAL

## 2020-12-06 VITALS
HEIGHT: 70 IN | RESPIRATION RATE: 20 BRPM | DIASTOLIC BLOOD PRESSURE: 88 MMHG | TEMPERATURE: 97.7 F | WEIGHT: 227 LBS | SYSTOLIC BLOOD PRESSURE: 177 MMHG | BODY MASS INDEX: 32.5 KG/M2 | OXYGEN SATURATION: 99 % | HEART RATE: 60 BPM

## 2020-12-06 DIAGNOSIS — M62.838 TRAPEZIUS MUSCLE SPASM: Primary | ICD-10-CM

## 2020-12-06 PROCEDURE — G0382 LEV 3 HOSP TYPE B ED VISIT: HCPCS | Performed by: PHYSICIAN ASSISTANT

## 2020-12-06 RX ORDER — LIDOCAINE 50 MG/G
1 PATCH TOPICAL DAILY
Qty: 15 PATCH | Refills: 0 | Status: SHIPPED | OUTPATIENT
Start: 2020-12-06 | End: 2021-01-11

## 2020-12-06 RX ORDER — NAPROXEN 500 MG/1
500 TABLET ORAL 2 TIMES DAILY WITH MEALS
Qty: 30 TABLET | Refills: 0 | Status: SHIPPED | OUTPATIENT
Start: 2020-12-06 | End: 2020-12-07

## 2020-12-06 RX ORDER — METHOCARBAMOL 500 MG/1
500 TABLET, FILM COATED ORAL 3 TIMES DAILY
Qty: 14 TABLET | Refills: 0 | Status: SHIPPED | OUTPATIENT
Start: 2020-12-06 | End: 2020-12-07

## 2020-12-07 ENCOUNTER — TELEPHONE (OUTPATIENT)
Dept: FAMILY MEDICINE CLINIC | Facility: CLINIC | Age: 55
End: 2020-12-07

## 2020-12-07 ENCOUNTER — OFFICE VISIT (OUTPATIENT)
Dept: FAMILY MEDICINE CLINIC | Facility: CLINIC | Age: 55
End: 2020-12-07
Payer: COMMERCIAL

## 2020-12-07 VITALS
SYSTOLIC BLOOD PRESSURE: 146 MMHG | DIASTOLIC BLOOD PRESSURE: 90 MMHG | WEIGHT: 232 LBS | HEIGHT: 70 IN | BODY MASS INDEX: 33.21 KG/M2

## 2020-12-07 DIAGNOSIS — M54.12 CERVICAL RADICULITIS: ICD-10-CM

## 2020-12-07 DIAGNOSIS — M54.2 CERVICAL PAIN (NECK): Primary | ICD-10-CM

## 2020-12-07 PROCEDURE — 99213 OFFICE O/P EST LOW 20 MIN: CPT | Performed by: FAMILY MEDICINE

## 2020-12-07 PROCEDURE — 1036F TOBACCO NON-USER: CPT | Performed by: FAMILY MEDICINE

## 2020-12-07 PROCEDURE — 3008F BODY MASS INDEX DOCD: CPT | Performed by: FAMILY MEDICINE

## 2020-12-07 RX ORDER — PREDNISONE 10 MG/1
TABLET ORAL
Qty: 45 TABLET | Refills: 0 | Status: SHIPPED | OUTPATIENT
Start: 2020-12-07 | End: 2020-12-22

## 2020-12-07 RX ORDER — CYCLOBENZAPRINE HCL 10 MG
10 TABLET ORAL
Qty: 30 TABLET | Refills: 1 | Status: SHIPPED | OUTPATIENT
Start: 2020-12-07 | End: 2020-12-22

## 2020-12-10 ENCOUNTER — EVALUATION (OUTPATIENT)
Dept: PHYSICAL THERAPY | Facility: MEDICAL CENTER | Age: 55
End: 2020-12-10
Payer: COMMERCIAL

## 2020-12-10 DIAGNOSIS — M54.2 CERVICAL PAIN (NECK): ICD-10-CM

## 2020-12-10 PROCEDURE — 97163 PT EVAL HIGH COMPLEX 45 MIN: CPT | Performed by: PHYSICAL THERAPIST

## 2020-12-10 PROCEDURE — 97140 MANUAL THERAPY 1/> REGIONS: CPT | Performed by: PHYSICAL THERAPIST

## 2020-12-14 ENCOUNTER — OFFICE VISIT (OUTPATIENT)
Dept: PHYSICAL THERAPY | Facility: MEDICAL CENTER | Age: 55
End: 2020-12-14
Payer: COMMERCIAL

## 2020-12-14 DIAGNOSIS — M54.2 CERVICAL PAIN (NECK): Primary | ICD-10-CM

## 2020-12-14 PROCEDURE — 97140 MANUAL THERAPY 1/> REGIONS: CPT | Performed by: PHYSICAL THERAPIST

## 2020-12-17 ENCOUNTER — OFFICE VISIT (OUTPATIENT)
Dept: PHYSICAL THERAPY | Facility: MEDICAL CENTER | Age: 55
End: 2020-12-17
Payer: COMMERCIAL

## 2020-12-17 DIAGNOSIS — M54.2 CERVICAL PAIN (NECK): Primary | ICD-10-CM

## 2020-12-17 PROCEDURE — 97140 MANUAL THERAPY 1/> REGIONS: CPT | Performed by: PHYSICAL THERAPIST

## 2020-12-17 PROCEDURE — 97010 HOT OR COLD PACKS THERAPY: CPT | Performed by: PHYSICAL THERAPIST

## 2020-12-21 ENCOUNTER — OFFICE VISIT (OUTPATIENT)
Dept: PHYSICAL THERAPY | Facility: MEDICAL CENTER | Age: 55
End: 2020-12-21
Payer: COMMERCIAL

## 2020-12-21 DIAGNOSIS — M54.2 CERVICAL PAIN (NECK): Primary | ICD-10-CM

## 2020-12-21 PROCEDURE — 97110 THERAPEUTIC EXERCISES: CPT | Performed by: PHYSICAL THERAPIST

## 2020-12-21 PROCEDURE — 97140 MANUAL THERAPY 1/> REGIONS: CPT | Performed by: PHYSICAL THERAPIST

## 2020-12-22 ENCOUNTER — OFFICE VISIT (OUTPATIENT)
Dept: FAMILY MEDICINE CLINIC | Facility: CLINIC | Age: 55
End: 2020-12-22
Payer: COMMERCIAL

## 2020-12-22 ENCOUNTER — APPOINTMENT (OUTPATIENT)
Dept: RADIOLOGY | Facility: MEDICAL CENTER | Age: 55
End: 2020-12-22
Payer: COMMERCIAL

## 2020-12-22 ENCOUNTER — TELEPHONE (OUTPATIENT)
Dept: FAMILY MEDICINE CLINIC | Facility: CLINIC | Age: 55
End: 2020-12-22

## 2020-12-22 VITALS
DIASTOLIC BLOOD PRESSURE: 92 MMHG | SYSTOLIC BLOOD PRESSURE: 142 MMHG | HEIGHT: 70 IN | BODY MASS INDEX: 33.21 KG/M2 | WEIGHT: 232 LBS

## 2020-12-22 DIAGNOSIS — M54.12 CERVICAL RADICULITIS: ICD-10-CM

## 2020-12-22 DIAGNOSIS — M54.6 ACUTE RIGHT-SIDED THORACIC BACK PAIN: Primary | ICD-10-CM

## 2020-12-22 DIAGNOSIS — M54.6 ACUTE RIGHT-SIDED THORACIC BACK PAIN: ICD-10-CM

## 2020-12-22 PROCEDURE — 71101 X-RAY EXAM UNILAT RIBS/CHEST: CPT

## 2020-12-22 PROCEDURE — 3725F SCREEN DEPRESSION PERFORMED: CPT | Performed by: FAMILY MEDICINE

## 2020-12-22 PROCEDURE — 72072 X-RAY EXAM THORAC SPINE 3VWS: CPT

## 2020-12-22 PROCEDURE — 99213 OFFICE O/P EST LOW 20 MIN: CPT | Performed by: FAMILY MEDICINE

## 2020-12-22 PROCEDURE — 3008F BODY MASS INDEX DOCD: CPT | Performed by: FAMILY MEDICINE

## 2020-12-22 PROCEDURE — 72050 X-RAY EXAM NECK SPINE 4/5VWS: CPT

## 2020-12-22 PROCEDURE — 1036F TOBACCO NON-USER: CPT | Performed by: FAMILY MEDICINE

## 2020-12-22 RX ORDER — DIAZEPAM 5 MG/1
5 TABLET ORAL EVERY 12 HOURS PRN
Qty: 30 TABLET | Refills: 0 | Status: SHIPPED | OUTPATIENT
Start: 2020-12-22

## 2020-12-22 RX ORDER — NALOXONE HYDROCHLORIDE 4 MG/.1ML
SPRAY NASAL
Qty: 1 EACH | Refills: 1 | Status: SHIPPED | OUTPATIENT
Start: 2020-12-22 | End: 2021-01-11

## 2020-12-22 RX ORDER — HYDROCODONE BITARTRATE AND ACETAMINOPHEN 7.5; 325 MG/1; MG/1
1 TABLET ORAL EVERY 6 HOURS PRN
Qty: 30 TABLET | Refills: 0 | Status: SHIPPED | OUTPATIENT
Start: 2020-12-22 | End: 2020-12-30 | Stop reason: SDUPTHER

## 2020-12-22 RX ORDER — CELECOXIB 200 MG/1
200 CAPSULE ORAL 2 TIMES DAILY
Qty: 60 CAPSULE | Refills: 1 | Status: SHIPPED | OUTPATIENT
Start: 2020-12-22 | End: 2021-02-22

## 2020-12-23 ENCOUNTER — OFFICE VISIT (OUTPATIENT)
Dept: PHYSICAL THERAPY | Facility: MEDICAL CENTER | Age: 55
End: 2020-12-23
Payer: COMMERCIAL

## 2020-12-23 DIAGNOSIS — M54.2 CERVICAL PAIN (NECK): Primary | ICD-10-CM

## 2020-12-23 DIAGNOSIS — R93.7 ABNORMAL X-RAY OF CERVICAL SPINE: ICD-10-CM

## 2020-12-23 PROCEDURE — 97140 MANUAL THERAPY 1/> REGIONS: CPT | Performed by: PHYSICAL THERAPIST

## 2020-12-23 PROCEDURE — 97012 MECHANICAL TRACTION THERAPY: CPT | Performed by: PHYSICAL THERAPIST

## 2020-12-28 ENCOUNTER — OFFICE VISIT (OUTPATIENT)
Dept: PHYSICAL THERAPY | Facility: MEDICAL CENTER | Age: 55
End: 2020-12-28
Payer: COMMERCIAL

## 2020-12-28 ENCOUNTER — TELEPHONE (OUTPATIENT)
Dept: FAMILY MEDICINE CLINIC | Facility: CLINIC | Age: 55
End: 2020-12-28

## 2020-12-28 DIAGNOSIS — M54.2 CERVICAL PAIN (NECK): Primary | ICD-10-CM

## 2020-12-28 PROCEDURE — 97140 MANUAL THERAPY 1/> REGIONS: CPT | Performed by: PHYSICAL THERAPIST

## 2020-12-30 ENCOUNTER — OFFICE VISIT (OUTPATIENT)
Dept: PHYSICAL THERAPY | Facility: MEDICAL CENTER | Age: 55
End: 2020-12-30
Payer: COMMERCIAL

## 2020-12-30 DIAGNOSIS — M54.6 ACUTE RIGHT-SIDED THORACIC BACK PAIN: ICD-10-CM

## 2020-12-30 DIAGNOSIS — M54.12 CERVICAL RADICULITIS: ICD-10-CM

## 2020-12-30 DIAGNOSIS — M54.2 CERVICAL PAIN (NECK): Primary | ICD-10-CM

## 2020-12-30 PROCEDURE — 97140 MANUAL THERAPY 1/> REGIONS: CPT | Performed by: PHYSICAL MEDICINE & REHABILITATION

## 2020-12-30 PROCEDURE — 97110 THERAPEUTIC EXERCISES: CPT | Performed by: PHYSICAL MEDICINE & REHABILITATION

## 2020-12-30 RX ORDER — HYDROCODONE BITARTRATE AND ACETAMINOPHEN 7.5; 325 MG/1; MG/1
1 TABLET ORAL EVERY 6 HOURS PRN
Qty: 10 TABLET | Refills: 0 | Status: SHIPPED | OUTPATIENT
Start: 2020-12-30 | End: 2021-01-11

## 2020-12-31 ENCOUNTER — TELEPHONE (OUTPATIENT)
Dept: FAMILY MEDICINE CLINIC | Facility: CLINIC | Age: 55
End: 2020-12-31

## 2021-01-04 ENCOUNTER — OFFICE VISIT (OUTPATIENT)
Dept: PHYSICAL THERAPY | Facility: MEDICAL CENTER | Age: 56
End: 2021-01-04
Payer: COMMERCIAL

## 2021-01-04 ENCOUNTER — TELEPHONE (OUTPATIENT)
Dept: FAMILY MEDICINE CLINIC | Facility: CLINIC | Age: 56
End: 2021-01-04

## 2021-01-04 DIAGNOSIS — M54.2 CERVICAL PAIN (NECK): Primary | ICD-10-CM

## 2021-01-04 PROCEDURE — 97140 MANUAL THERAPY 1/> REGIONS: CPT | Performed by: PHYSICAL MEDICINE & REHABILITATION

## 2021-01-04 PROCEDURE — 97110 THERAPEUTIC EXERCISES: CPT | Performed by: PHYSICAL MEDICINE & REHABILITATION

## 2021-01-04 PROCEDURE — 97112 NEUROMUSCULAR REEDUCATION: CPT | Performed by: PHYSICAL MEDICINE & REHABILITATION

## 2021-01-04 NOTE — TELEPHONE ENCOUNTER
PT CALLED TODAY REGARDING HIS MRI DENIAL  COULD YOU PLEASE CALL HIM TOMORROW AFTER 2 TO DISCUSS THIS WITH HIM  THANK YOU

## 2021-01-06 NOTE — PROGRESS NOTES
Daily Note     Today's date: 2020  Patient name: Vibha Smith  : 1965  MRN: [id number]  Referring provider: Kelly Gimenez DO  Dx:   Encounter Diagnosis     ICD-10-CM    1  Cervical pain (neck)  M54 2                   Subjective: [facility] reported "I am feeling much better, I still have pain, but not nearly as bad "      Objective: See treatment diary below      Assessment: Tolerated treatment well  Patient would benefit from continued PT  Due to Dario's subjective report of decreased pain this therapy program was not adjusted during today's session  Plan: Continue per plan of care        Precautions: none      Manuals 2020            SI right 1-3 ribs             Thoracic pistol Gr  v            c5-7 rotational mob             stm 10min            IASTM JR                                                                                                       Ther Ex             UT Stretch 4x30"            LS Stretch 4x30"                                                                                                                                                                        Modalities             Mechanical traction

## 2021-01-07 ENCOUNTER — OFFICE VISIT (OUTPATIENT)
Dept: PHYSICAL THERAPY | Facility: MEDICAL CENTER | Age: 56
End: 2021-01-07
Payer: COMMERCIAL

## 2021-01-07 ENCOUNTER — CONSULT (OUTPATIENT)
Dept: PAIN MEDICINE | Facility: MEDICAL CENTER | Age: 56
End: 2021-01-07
Payer: COMMERCIAL

## 2021-01-07 VITALS
HEART RATE: 64 BPM | DIASTOLIC BLOOD PRESSURE: 97 MMHG | WEIGHT: 233 LBS | SYSTOLIC BLOOD PRESSURE: 152 MMHG | TEMPERATURE: 97.8 F | HEIGHT: 70 IN | BODY MASS INDEX: 33.36 KG/M2 | RESPIRATION RATE: 16 BRPM

## 2021-01-07 DIAGNOSIS — M54.2 CERVICAL PAIN (NECK): Primary | ICD-10-CM

## 2021-01-07 DIAGNOSIS — M54.2 CERVICAL PAIN (NECK): ICD-10-CM

## 2021-01-07 DIAGNOSIS — M48.02 FORAMINAL STENOSIS OF CERVICAL REGION: ICD-10-CM

## 2021-01-07 DIAGNOSIS — M54.12 CERVICAL RADICULITIS: Primary | ICD-10-CM

## 2021-01-07 PROCEDURE — 97140 MANUAL THERAPY 1/> REGIONS: CPT | Performed by: PHYSICAL MEDICINE & REHABILITATION

## 2021-01-07 PROCEDURE — 97110 THERAPEUTIC EXERCISES: CPT | Performed by: PHYSICAL MEDICINE & REHABILITATION

## 2021-01-07 PROCEDURE — 97112 NEUROMUSCULAR REEDUCATION: CPT | Performed by: PHYSICAL MEDICINE & REHABILITATION

## 2021-01-07 PROCEDURE — 99204 OFFICE O/P NEW MOD 45 MIN: CPT | Performed by: PHYSICAL MEDICINE & REHABILITATION

## 2021-01-07 RX ORDER — GABAPENTIN 100 MG/1
100 CAPSULE ORAL
COMMUNITY
Start: 2020-12-28 | End: 2021-01-07

## 2021-01-07 RX ORDER — GABAPENTIN 300 MG/1
300 CAPSULE ORAL 3 TIMES DAILY
Qty: 90 CAPSULE | Refills: 1 | Status: SHIPPED | OUTPATIENT
Start: 2021-01-07 | End: 2021-02-06

## 2021-01-07 NOTE — PATIENT INSTRUCTIONS
Cervical Radiculopathy   WHAT YOU NEED TO KNOW:   Cervical radiculopathy is a painful condition that happens when a spinal nerve in your neck is pinched or irritated  DISCHARGE INSTRUCTIONS:   Medicines: You may need any of the following:  · NSAIDs  help decrease swelling and pain  This medicine can be bought without a doctor's order  This medicine can cause stomach bleeding or kidney problems in certain people  If you take blood thinner medicine, always ask your healthcare provider if NSAIDs are safe for you  Always read the medicine label and follow the directions on it before using this medicine  · Prescription pain medicine  helps decrease pain  Do not wait until the pain is severe before you take this medicine  · Steroids  help decrease pain and swelling  These may be given as a pill or as an injection in your neck  You may need more than 1 injection if your symptoms do not improve after the first treatment  · Take your medicine as directed  Contact your healthcare provider if you think your medicine is not helping or if you have side effects  Tell him of her if you are allergic to any medicine  Keep a list of the medicines, vitamins, and herbs you take  Include the amounts, and when and why you take them  Bring the list or the pill bottles to follow-up visits  Carry your medicine list with you in case of an emergency  Follow up with your healthcare provider or spine specialist as directed:  Write down your questions so you remember to ask them during your visits  Physical therapy:  Your healthcare provider may suggest physical therapy to stretch and strengthen your muscles  Your physical therapist can teach you how to improve your posture and the way you hold your neck  He may also teach you how to be safely active and avoid further injury  He can also help you develop an exercise program that is safe for your back and neck  Self-care:   · Ice  helps decrease swelling and pain   Ice may also help prevent tissue damage  Use an ice pack, or put crushed ice in a plastic bag  Cover it with a towel and place it on your neck for 15 to 20 minutes every hour or as directed  · Rest  when you feel it is needed  Slowly start to do more each day  Return to your daily activities as directed  · Wear a soft collar  You may be given a soft collar to support your neck while you sleep  Wear the soft collar only as directed  · Do light stretches and regular exercise  Your healthcare provider may suggest light stretches to help decrease stiffness in your neck and arm as you recover  After your pain is controlled, you may benefit from regular exercise  Ask what type of exercise is safe for your back and neck  · Review your work area  A comfortable work area can help prevent neck strain  Ask your employer for an ergonomic review to check the position of your desk, chair, phone, and computer  Make any necessary adjustments for your comfort  Contact your healthcare provider or spine specialist if:   · You have a fever  · You are losing weight without trying  · Your pain is worse, even with medicine  · One or both hands feel more numb than before, or you cannot move your fingers well  · You have questions or concerns about your condition or care  © Copyright [address] Information is for End User's use only and may not be sold, redistributed or otherwise used for commercial purposes  All illustrations and images included in CareNotes® are the copyrighted property of [employer]  or [facility] Torsten Waldron   The above information is an  only  It is not intended as medical advice for individual conditions or treatments  Talk to your doctor, nurse or pharmacist before following any medical regimen to see if it is safe and effective for you

## 2021-01-07 NOTE — PROGRESS NOTES
Assessment  1  Cervical radiculitis    2  Foraminal stenosis of cervical region    3  Cervical pain (neck)        Plan  1  Initiate gabapentin 300 mg q h s  And titrate up to t i d  Dosing  He has been tolerating the 100 mg dose without any significant side effects  We will titrate this up for further benefit  2  MRI of the cervical spine is warranted at this time as the patient has participated in conservative care including oral analgesics and physical therapy for 8 sessions within the past 3 months  Despite conservative approaches he is continuing to have worsening of symptoms  3  Depending on the results of the MRI we will determine if cervical epidural steroid injection or referral to 1 of our spine surgeons is indicated  4  In the meantime he will continue with physical therapy  My impressions and treatment recommendations were discussed in detail with the patient who verbalized understanding and had no further questions  Discharge instructions were provided  I personally saw and examined the patient and I agree with the above discussed plan of care  Orders Placed This Encounter   Procedures    MRI cervical spine wo contrast     Standing Status:   Future     Standing Expiration Date:   1/7/2025     Scheduling Instructions: There is no preparation for this test  Please leave your jewelry and valuables at home, wedding rings are the exception  Magnetic nail polish must be removed prior to arrival for your test  Please bring your insurance cards, a form of photo ID and a list of your medications with you  Arrive 15 minutes prior to your appointment time in order to register  Please bring any prior CT or MRI studies of this area that were not performed at a [facility]  To schedule this appointment, please contact Central Scheduling at [phone]              Prior to your appointment, please make sure you complete the MRI Screening Form when you e-Check in for your appointment  This will be available starting 7 days before your appointment in [facility]  You may receive an e-mail with an activation code if you do not have a [facility] account  If you do not have access to a device, we will complete your screening at your appointment  Order Specific Question:   What is the patient's sedation requirement? Answer:   No Sedation     Order Specific Question:   Does the patient have metallic implants? Answer:   No     Order Specific Question:   Does this procedure require the 3T MRI at [place]? Answer:   No     Order Specific Question:   Release to patient through [email or web address]     Answer:   Immediate     Order Specific Question:   Is order priority selected as STAT? Answer:   No     Order Specific Question:   Reason for Exam (FREE TEXT)     Answer:   neck and radiating right arm pain     New Medications Ordered This Visit   Medications    gabapentin (NEURONTIN) 300 mg capsule     Sig: Take 1 capsule (300 mg total) by mouth 3 (three) times a day 1 QHS x 3 days, 1 BID x 3 days, then 1 TID     Dispense:  90 capsule     Refill:  1       History of Present Illness    Joan Case is a 63 y o  male seen in consultation at the request of Dr Delroy Ruiz regarding chronic neck and radiating pain into the right upper extremity  This has been present for about 2 months without any inciting event or trauma and described as severe pain rated as a 9 to 10/10 and nearly constant  This is worse in the morning and evening and described as shooting and sharp pain  He denies any weakness in the upper extremity  Aggravating factors include lying down, standing, sitting, bowel movements  Alleviating factors include relaxation  Diagnostic studies include x-rays of the cervical spine which demonstrate significant foraminal narrowing at multiple levels which likely is accounting for some of his symptoms      Treatments have included physical therapy with moderate relief as well as with chiropractic manipulation and no relief from local heat or ice application or traction  The patient does not smoke tobacco but does admit to weekly marijuana use and daily alcohol use  He has used Vicodin with some mild relief and diclofenac, lidocaine patches, acetaminophen, cyclobenzaprine, celecoxib without significant benefit  He is using low-dose gabapentin without any side effects but only mild improvement with this  I have personally reviewed and/or updated the patient's past medical history, past surgical history, family history, social history, current medications, allergies, and vital signs today  Review of Systems   Constitutional: Negative for fever and unexpected weight change  HENT: Negative for trouble swallowing  Eyes: Negative for visual disturbance  Respiratory: Negative for shortness of breath and wheezing  Cardiovascular: Negative for chest pain and palpitations  Gastrointestinal: Negative for constipation, diarrhea, nausea and vomiting  Endocrine: Negative for cold intolerance, heat intolerance and polydipsia  Genitourinary: Negative for difficulty urinating and frequency  Musculoskeletal: Positive for myalgias, neck pain and neck stiffness  Negative for arthralgias, gait problem and joint swelling  Skin: Negative for rash  Neurological: Negative for dizziness, seizures, syncope, weakness and headaches  Hematological: Does not bruise/bleed easily  Psychiatric/Behavioral: Positive for decreased concentration and sleep disturbance  Negative for dysphoric mood  All other systems reviewed and are negative        Patient Active Problem List   Diagnosis    Cervical pain (neck)    Esophageal reflux    Facial basal cell cancer    Hyperlipidemia    Vitamin D deficiency    Upper respiratory infection    Coronary artery disease involving native coronary artery    Chest pain due to myocardial ischemia    Right lateral epicondylitis    Tinea pedis of both feet    Cervical radiculitis       Past Medical History:   Diagnosis Date    Allergic     Anxiety     Basal cell carcinoma     Cancer (HCC)     Neck pain on right side        Past Surgical History:   Procedure Laterality Date    HEMORRHOID SURGERY         Family History   Problem Relation Age of Onset    No Known Problems Mother     No Known Problems Father        Social History     Occupational History    Not on file   Tobacco Use    Smoking status: Former Smoker    Smokeless tobacco: Never Used   Substance and Sexual Activity    Alcohol use: Yes     Comment: daily    Drug use: Yes     Frequency: 1 0 times per week     Types: Marijuana    Sexual activity: Yes     Partners: Female       Current Outpatient Medications on File Prior to Visit   Medication Sig    celecoxib (CeleBREX) 200 mg capsule Take 1 capsule (200 mg total) by mouth 2 (two) times a day    Cholecalciferol (VITAMIN D) 2000 units CAPS Take 2,000 Units by mouth daily     CLOTRIMAZOLE EX Apply topically 2 (two) times a day     diazepam (VALIUM) 5 mg tablet Take 1 tablet (5 mg total) by mouth every 12 (twelve) hours as needed for anxiety    econazole nitrate 1 % cream Apply topically daily    esomeprazole (NexIUM) 20 mg capsule Take 20 mg by mouth every other day     fexofenadine (ALLEGRA) 180 MG tablet Take 180 mg by mouth daily    HYDROcodone-acetaminophen (NORCO) 7 5-325 mg per tablet Take 1 tablet by mouth every 6 (six) hours as needed for painMax Daily Amount: 4 tablets    lidocaine (LIDODERM) 5 % Apply 1 patch topically daily Remove & Discard patch within 12 hours or as directed by MD    naloxone (NARCAN) 4 mg/0 1 mL nasal spray Administer 1 spray into a nostril  If no response after 2-3 minutes, give another dose in the other nostril using a new spray      psyllium (METAMUCIL) 58 6 % powder Take 1 packet by mouth 3 (three) times a day    [DISCONTINUED] gabapentin (NEURONTIN) 100 mg capsule Take 100 mg by mouth daily at bedtime     No current facility-administered medications on file prior to visit  Allergies   Allergen Reactions    Lamisil Af Defense [Tolnaftate] Hives       Physical Exam    /97   Pulse 64   Temp 97 8 °F (36 6 °C)   Resp 16   Ht 5' 10" (1 778 m)   Wt 106 kg (233 lb)   BMI 33 43 kg/m²     CERVICAL  General: Well-developed, well-nourished individual in mild to moderate acute distress  Mental: Appropriate mood and affect  Grossly oriented with coherent speech and thought processing   Neuro:  Cranial nerves: Cranial nerve function is grossly intact bilaterally   Strength: Bilateral upper extremity strength is normal and symmetric   No atrophy or tone abnormalities noted   Reflexes: Bilateral upper extremity muscle stretch reflexes are absent bilaterally    No Hilton sign   Sensation: No loss of sensation is noted   Foraminal Compression Maneuvers:  Spurling sign is absent   Gait:  Gait/gross motor: Gait is normal  Station is normal      Musculoskeletal:  Palpation: Inspection and palpation of the spine and extremities are unremarkable except for tenderness to palpation along the right cervical facet joints and paraspinal musculature as well as the trapezius muscle on the right  Spine: Normal pain-free range of motion except for end range cervical flexion which reproduces pain complaint, he also has limitation with cervical extension but no radicular features with this  No gross axial skeletal deformities   Skin: Skin inspection grossly negative for erythema, breakdown, or concerning lesions in affected area   Lymph: No lymphadenopathy is appreciated in the involved extremity   Vessels: No lower extremity edema   Lungs: Breathing is comfortable and regular  No dyspnea noted during examination   Eyes: Visual field grossly intact to confrontation  No redness appreciated  ENT: No craniofacial deformities or asymmetry  No neck masses appreciated            Imaging  Study Result    CERVICAL SPINE     INDICATION:   M54 12: Radiculopathy, cervical region      COMPARISON:  Cervical spine radiograph 4/8/2016     VIEWS:  XR SPINE CERVICAL COMPLETE 4 OR 5 VW NON INJURY         FINDINGS:     No fracture       Normal alignment without subluxation      The intervertebral disc spaces are preserved  Small multilevel marginal spurs, grossly stable   Multilevel facet and uncovertebral degenerative changes right greater than left mild interval progression of disease      Progressive severe neuroforaminal narrowing on the right at C3-4 and to a lesser extent C4-5 and C5-6      Stable mild neuroforaminal narrowing on the left at C4-5      The prevertebral soft tissues are within normal limits        The lung apices are clear      IMPRESSION:     No acute osseous abnormality      Degenerative changes as above         Workstation performed: [id number]

## 2021-01-07 NOTE — PROGRESS NOTES
Daily Note     Today's date: 2021  Patient name: Yamel Lewis  : 1965  MRN: [id number]  Referring provider: Ej Zaldivar DO  Dx:   Encounter Diagnosis     ICD-10-CM    1  Cervical pain (neck)  M54 2                   Subjective: Marcelle Valdes reported "I am doing better I am around a 6/10 for pain "      Objective: See treatment diary below      Assessment: Tolerated treatment well  Patient would benefit from continued PT  Marcelle Valdes was able to add scapular retractions to his therapy program which shows progress towards his goals  He denied an increase in pain during the retractions  He reported "I have less pain now " Following the session  Plan: Continue per plan of care        Precautions: none      Manuals 2020           SI right 1-3 ribs             Thoracic pistol Gr  v            c5-7 rotational mob             stm 10min            IASTM JR JR           IASTM  JR                                                                                         Ther Ex             UT Stretch 4x30" 4x30"           LS Stretch 4x30" 4x30"           Scapular Retraction  3x10                                                                                                                                                          Modalities             Mechanical traction

## 2021-01-11 ENCOUNTER — OFFICE VISIT (OUTPATIENT)
Dept: FAMILY MEDICINE CLINIC | Facility: CLINIC | Age: 56
End: 2021-01-11
Payer: COMMERCIAL

## 2021-01-11 VITALS
TEMPERATURE: 97.7 F | WEIGHT: 237.8 LBS | HEIGHT: 70 IN | DIASTOLIC BLOOD PRESSURE: 102 MMHG | SYSTOLIC BLOOD PRESSURE: 150 MMHG | BODY MASS INDEX: 34.04 KG/M2

## 2021-01-11 DIAGNOSIS — M54.12 CERVICAL RADICULITIS: Primary | ICD-10-CM

## 2021-01-11 DIAGNOSIS — M54.2 CERVICAL PAIN (NECK): ICD-10-CM

## 2021-01-11 DIAGNOSIS — I10 ESSENTIAL HYPERTENSION: ICD-10-CM

## 2021-01-11 DIAGNOSIS — I25.10 CORONARY ARTERY DISEASE INVOLVING NATIVE CORONARY ARTERY OF NATIVE HEART WITHOUT ANGINA PECTORIS: ICD-10-CM

## 2021-01-11 PROCEDURE — 99214 OFFICE O/P EST MOD 30 MIN: CPT | Performed by: FAMILY MEDICINE

## 2021-01-11 RX ORDER — TRAMADOL HYDROCHLORIDE 50 MG/1
50 TABLET ORAL EVERY 6 HOURS PRN
Qty: 15 TABLET | Refills: 0 | Status: SHIPPED | OUTPATIENT
Start: 2021-01-11

## 2021-01-11 RX ORDER — AMLODIPINE BESYLATE 5 MG/1
5 TABLET ORAL DAILY
Qty: 30 TABLET | Refills: 1 | Status: SHIPPED | OUTPATIENT
Start: 2021-01-11

## 2021-01-11 RX ORDER — NALOXONE HYDROCHLORIDE 4 MG/.1ML
SPRAY NASAL
Qty: 1 EACH | Refills: 1 | Status: SHIPPED | OUTPATIENT
Start: 2021-01-11

## 2021-01-11 RX ORDER — ASPIRIN 81 MG/1
81 TABLET ORAL DAILY
COMMUNITY

## 2021-01-11 NOTE — PROGRESS NOTES
Assessment/Plan:  Patient with severe pain in the right shoulder/upper back region  Guidance given  Patient may increase gabapentin dating 1800mg daily  Awaiting MRI of the cervical spine this week  Patient will continue with physical therapy  Patient follow-up pain management appropriately  X-rays reviewed with the patient  Patient will try tramadol as needed  The FMLA paperwork will be completed  Patient will remain out of work for the next 3 weeks  Patient start Norvasc for hypertension  Follow-up in 3 weeks       Diagnoses and all orders for this visit:    Cervical radiculitis  -     traMADol (ULTRAM) 50 mg tablet; Take 1 tablet (50 mg total) by mouth every 6 (six) hours as needed for severe pain  -     naloxone (NARCAN) 4 mg/0 1 mL nasal spray; Administer 1 spray into a nostril  If no response after 2-3 minutes, give another dose in the other nostril using a new spray  Cervical pain (neck)  -     traMADol (ULTRAM) 50 mg tablet; Take 1 tablet (50 mg total) by mouth every 6 (six) hours as needed for severe pain  -     naloxone (NARCAN) 4 mg/0 1 mL nasal spray; Administer 1 spray into a nostril  If no response after 2-3 minutes, give another dose in the other nostril using a new spray  Coronary artery disease involving native coronary artery of native heart without angina pectoris    Essential hypertension  -     amLODIPine (NORVASC) 5 mg tablet; Take 1 tablet (5 mg total) by mouth daily    Other orders  -     aspirin (ECOTRIN LOW STRENGTH) 81 mg EC tablet; Take 81 mg by mouth daily            Subjective:        Patient ID: Juve Paul is a 63 y o  male  Patient here to follow-up on cervical radiculopathy  Patient using hydrocodone with minimal improvement  Patient does see pain management  Patient is going for of cervical spine this week  No chest pain or shortness of breath  Patient has off on hydrocodone at the moment for the past week    Patient status post basal cell carcinoma surgery the the recently with Dermatology Dr Erika Salinas  No fever chills  No other complaints at the present time  Patient will have FMLA paperwork as he is at work at this time  The following portions of the patient's history were reviewed and updated as appropriate: allergies, current medications, past family history, past medical history, past social history, past surgical history and problem list       Review of Systems   Constitutional: Negative  HENT: Negative  Eyes: Negative  Respiratory: Negative  Cardiovascular: Negative  Gastrointestinal: Negative  Endocrine: Negative  Genitourinary: Negative  Musculoskeletal: Positive for neck pain  Skin: Positive for color change  Allergic/Immunologic: Negative  Neurological: Negative  Hematological: Negative  Psychiatric/Behavioral: Negative  Objective:      BMI Counseling: Body mass index is 34 12 kg/m²  The BMI is above normal  Nutrition recommendations include decreasing portion sizes  Exercise recommendations include moderate physical activity 150 minutes/week  BP (!) 150/102 (BP Location: Right arm, Patient Position: Sitting, Cuff Size: Standard)   Temp 97 7 °F (36 5 °C) (Tympanic)   Ht 5' 10" (1 778 m)   Wt 108 kg (237 lb 12 8 oz)   BMI 34 12 kg/m²          Physical Exam  Vitals signs and nursing note reviewed  Constitutional:       General: He is not in acute distress  Appearance: Normal appearance  He is not ill-appearing, toxic-appearing or diaphoretic  HENT:      Head: Normocephalic and atraumatic  Right Ear: Tympanic membrane, ear canal and external ear normal  There is no impacted cerumen  Left Ear: Tympanic membrane, ear canal and external ear normal  There is no impacted cerumen  Nose: Nose normal  No congestion or rhinorrhea  Mouth/Throat:      Mouth: Mucous membranes are moist       Pharynx: No oropharyngeal exudate or posterior oropharyngeal erythema  Eyes:      General: No scleral icterus  Right eye: No discharge  Left eye: No discharge  Extraocular Movements: Extraocular movements intact  Conjunctiva/sclera: Conjunctivae normal       Pupils: Pupils are equal, round, and reactive to light  Neck:      Musculoskeletal: Neck supple  Muscular tenderness present  No neck rigidity  Vascular: No carotid bruit  Cardiovascular:      Rate and Rhythm: Normal rate and regular rhythm  Pulses: Normal pulses  Heart sounds: Normal heart sounds  No murmur  No friction rub  No gallop  Pulmonary:      Effort: Pulmonary effort is normal  No respiratory distress  Breath sounds: Normal breath sounds  No stridor  No wheezing, rhonchi or rales  Chest:      Chest wall: No tenderness  Abdominal:      General: Abdomen is flat  Bowel sounds are normal  There is no distension  Palpations: Abdomen is soft  Tenderness: There is no abdominal tenderness  There is no guarding or rebound  Musculoskeletal: Normal range of motion  General: No swelling, tenderness, deformity or signs of injury  Right lower leg: No edema  Left lower leg: No edema  Lymphadenopathy:      Cervical: No cervical adenopathy  Skin:     General: Skin is warm and dry  Capillary Refill: Capillary refill takes less than 2 seconds  Coloration: Skin is not jaundiced  Findings: No bruising, erythema, lesion or rash  Neurological:      General: No focal deficit present  Mental Status: He is alert and oriented to person, place, and time  Cranial Nerves: No cranial nerve deficit  Sensory: No sensory deficit  Motor: No weakness  Coordination: Coordination normal       Gait: Gait normal    Psychiatric:         Mood and Affect: Mood normal          Behavior: Behavior normal          Thought Content:  Thought content normal          Judgment: Judgment normal

## 2021-01-12 ENCOUNTER — OFFICE VISIT (OUTPATIENT)
Dept: PHYSICAL THERAPY | Facility: MEDICAL CENTER | Age: 56
End: 2021-01-12
Payer: COMMERCIAL

## 2021-01-12 DIAGNOSIS — M54.2 CERVICAL PAIN (NECK): Primary | ICD-10-CM

## 2021-01-12 PROCEDURE — 97110 THERAPEUTIC EXERCISES: CPT | Performed by: PHYSICAL MEDICINE & REHABILITATION

## 2021-01-12 PROCEDURE — 97140 MANUAL THERAPY 1/> REGIONS: CPT | Performed by: PHYSICAL MEDICINE & REHABILITATION

## 2021-01-12 PROCEDURE — 97112 NEUROMUSCULAR REEDUCATION: CPT | Performed by: PHYSICAL MEDICINE & REHABILITATION

## 2021-01-12 NOTE — PROGRESS NOTES
Daily Note     Today's date: 2021  Patient name: Dudley Marsh  : 1965  MRN: [id number]  Referring provider: Korina Barraza DO  Dx:   Encounter Diagnosis     ICD-10-CM    1  Cervical pain (neck)  M54 2                   Subjective: Reanna Garcia reported "I am doing well, I am starting to feel a little bit better "      Objective: See treatment diary below      Assessment: Tolerated treatment well  Patient would benefit from continued PT  He was able to progress his therapy program as seen below  He denied an increase in pain following the session  Plan: Continue per plan of care        Precautions: none      Manuals 2020          SI right 1-3 ribs             Thoracic pistol Gr  v            c5-7 rotational mob             stm 10min            IASTM JR JR JR          IASTM  JR           P-A   JR                                                                           Ther Ex             UT Stretch 4x30" 4x30" 4x30"          LS Stretch 4x30" 4x30" 4x30"          Scapular Retraction  3x10 3x10          Standing Rows   [place] 3x10          Standing SAPD   [place] 3x10          Robbers   [place] 3x10                                                                                                                  Modalities             Mechanical traction

## 2021-01-14 ENCOUNTER — HOSPITAL ENCOUNTER (OUTPATIENT)
Dept: MRI IMAGING | Facility: HOSPITAL | Age: 56
Discharge: HOME | End: 2021-01-14
Payer: COMMERCIAL

## 2021-01-14 ENCOUNTER — OFFICE VISIT (OUTPATIENT)
Dept: PHYSICAL THERAPY | Facility: MEDICAL CENTER | Age: 56
End: 2021-01-14
Payer: COMMERCIAL

## 2021-01-14 DIAGNOSIS — M54.2 CERVICAL PAIN (NECK): ICD-10-CM

## 2021-01-14 DIAGNOSIS — M54.12 CERVICAL RADICULITIS: ICD-10-CM

## 2021-01-14 DIAGNOSIS — M48.02 FORAMINAL STENOSIS OF CERVICAL REGION: ICD-10-CM

## 2021-01-14 DIAGNOSIS — M54.2 CERVICAL PAIN (NECK): Primary | ICD-10-CM

## 2021-01-14 PROCEDURE — 97110 THERAPEUTIC EXERCISES: CPT | Performed by: PHYSICAL MEDICINE & REHABILITATION

## 2021-01-14 PROCEDURE — 97112 NEUROMUSCULAR REEDUCATION: CPT | Performed by: PHYSICAL MEDICINE & REHABILITATION

## 2021-01-14 PROCEDURE — 97140 MANUAL THERAPY 1/> REGIONS: CPT | Performed by: PHYSICAL MEDICINE & REHABILITATION

## 2021-01-14 PROCEDURE — 72141 MRI NECK SPINE W/O DYE: CPT

## 2021-01-14 PROCEDURE — G1004 CDSM NDSC: HCPCS

## 2021-01-14 NOTE — PROGRESS NOTES
Daily Note     Today's date: 2021  Patient name: Maria Teresa Rater  : 1965  MRN: [id number]  Referring provider: Reese Morrow DO  Dx:   Encounter Diagnosis     ICD-10-CM    1  Cervical pain (neck)  M54 2                   Subjective: Deborah Lindquist reported "this is the best I have felt yet, I woke up the other day and the pain wasn't too bad "      Objective: See treatment diary below      Assessment: Tolerated treatment well  Patient would benefit from continued PT  Due to his subject report of feeling notably better nothing was changed during his therapy program     Plan: Continue per plan of care        Precautions: none      Manuals 2020         SI right 1-3 ribs             Thoracic pistol Gr  v            c5-7 rotational mob             stm 10min            IASTM JR JR JR JR         IASTM  JR           P-A   JR JR                                                                          Ther Ex             UT Stretch 4x30" 4x30" 4x30" 4x30"         LS Stretch 4x30" 4x30" 4x30" 4x30"         Scapular Retraction  3x10 3x10 3x10         Standing Rows   [place] 3x10 [place] 3x10         Standing SAPD   [place] 3x10 [place] 3x10         Robbers   [place] 3x10 [place] 3x10                                                                                                                 Modalities             Mechanical traction

## 2021-01-15 ENCOUNTER — TELEPHONE (OUTPATIENT)
Dept: PAIN MEDICINE | Facility: MEDICAL CENTER | Age: 56
End: 2021-01-15

## 2021-01-15 NOTE — TELEPHONE ENCOUNTER
S/W pt  Advised pt of the same  Pt stated he feels better recently  He is not sure when PT will be done  He will check with PT and he will call [facility] back to schedule a SOVS   Pt verbalized understanding

## 2021-01-15 NOTE — TELEPHONE ENCOUNTER
----- Message from Bianca Malloy DO sent at 1/15/2021  1:02 PM EST -----  MRI is essentially normal   No findings to account for any radicular symptoms  Recommend he continue with PT and as long as he continues to improve nothing else needed  Please offer him an office visit to follow up with me once PT is completed if he would like

## 2021-01-19 ENCOUNTER — OFFICE VISIT (OUTPATIENT)
Dept: PHYSICAL THERAPY | Facility: MEDICAL CENTER | Age: 56
End: 2021-01-19
Payer: COMMERCIAL

## 2021-01-19 DIAGNOSIS — M54.2 CERVICAL PAIN (NECK): Primary | ICD-10-CM

## 2021-01-19 PROCEDURE — 97140 MANUAL THERAPY 1/> REGIONS: CPT | Performed by: PHYSICAL MEDICINE & REHABILITATION

## 2021-01-19 PROCEDURE — 97110 THERAPEUTIC EXERCISES: CPT | Performed by: PHYSICAL MEDICINE & REHABILITATION

## 2021-01-19 PROCEDURE — 97112 NEUROMUSCULAR REEDUCATION: CPT | Performed by: PHYSICAL MEDICINE & REHABILITATION

## 2021-01-19 NOTE — PROGRESS NOTES
Daily Note     Today's date: 2021  Patient name: Juve Paul  : 1965  MRN: [id number]  Referring provider: Ken Bay DO  Dx:   Encounter Diagnosis     ICD-10-CM    1  Cervical pain (neck)  M54 2                   Subjective: [facility] reported "I am doing much better, I am having around a 4-5 for pain  Objective: See treatment diary below      Assessment: Tolerated treatment well  Patient would benefit from continued PT  TAYLOR POLANCO was able to increase the resistance for his scapular exercises which shows progress towards his goals  Plan: Continue per plan of care        Precautions: none      Manuals 2020        SI right 1-3 ribs             Thoracic pistol Gr  v            c5-7 rotational mob             stm 10min            IASTM JR [address] JR        IASTM  JR           P-A   [address]                                                                         Ther Ex             UT Stretch 4x30" 4x30" 4x30" 4x30" 4x30"        LS Stretch 4x30" 4x30" 4x30" 4x30" 4x30"        Scapular Retraction  3x10 3x10 3x10 3x10        Standing Rows   [place] 3x10 [place] 3x10 [place] 3x10        Standing SAPD   [place] 3x10 [place] 3x10 [place] 3x10        Robbers   [place] 3x10 [place] 3x10 [place] 3x10                                                                                                                Modalities             Mechanical traction

## 2021-01-20 ENCOUNTER — TELEPHONE (OUTPATIENT)
Dept: PAIN MEDICINE | Facility: MEDICAL CENTER | Age: 56
End: 2021-01-20

## 2021-01-20 NOTE — TELEPHONE ENCOUNTER
----- Message from Anna Archer DO sent at 1/19/2021  4:42 PM EST -----  Normal cervical spine MRI    Follow up in office for further evaluation

## 2021-01-21 ENCOUNTER — APPOINTMENT (OUTPATIENT)
Dept: PHYSICAL THERAPY | Facility: MEDICAL CENTER | Age: 56
End: 2021-01-21
Payer: COMMERCIAL

## 2021-01-21 NOTE — TELEPHONE ENCOUNTER
--CLARE--    RN s/w pt regarding previous  Pt to keep appt on 1/25, pt and pt's wife had questions regarding cervical xrays and MRI  Per pt they are contradicting with what they say  Xrays talk about severe foraminal narrowing MRI says normal  Willing to talk on 1/25

## 2021-01-22 ENCOUNTER — OFFICE VISIT (OUTPATIENT)
Dept: PHYSICAL THERAPY | Facility: MEDICAL CENTER | Age: 56
End: 2021-01-22
Payer: COMMERCIAL

## 2021-01-22 DIAGNOSIS — M54.2 CERVICAL PAIN (NECK): Primary | ICD-10-CM

## 2021-01-22 PROCEDURE — 97110 THERAPEUTIC EXERCISES: CPT | Performed by: PHYSICAL MEDICINE & REHABILITATION

## 2021-01-22 PROCEDURE — 97140 MANUAL THERAPY 1/> REGIONS: CPT | Performed by: PHYSICAL MEDICINE & REHABILITATION

## 2021-01-22 PROCEDURE — 97112 NEUROMUSCULAR REEDUCATION: CPT | Performed by: PHYSICAL MEDICINE & REHABILITATION

## 2021-01-22 NOTE — PROGRESS NOTES
Daily Note     Today's date: 2021  Patient name: Francisco Romero  : 1965  MRN: [id number]  Referring provider: Pierce Tracey DO  Dx:   Encounter Diagnosis     ICD-10-CM    1  Cervical pain (neck)  M54 2                   Subjective: Morteza Fletcher reported "I am doing much better, my pain is down to a 3/10 "    Objective: See treatment diary below      Assessment: Tolerated treatment well  Patient would benefit from continued PT  Morteza Fletcher was able to progress his scapular strengthening which shows progress towards his goals  He demonstrated less tissue resistance during today's session than previous sessions  Plan: Continue per plan of care        Precautions: none      Manuals 2020       SI right 1-3 ribs             Thoracic pistol Gr  v            c5-7 rotational mob             stm 10min            IASTM [facility]       IASTM  JR           P-A   [facility]                                                                        Ther Ex             [place] Stretch 4x30" 4x30" 4x30" 4x30" 4x30" 4x30"       LS Stretch 4x30" 4x30" 4x30" 4x30" 4x30" 4x30"       Scapular Retraction  3x10 3x10 3x10 3x10 3x10       Standing Rows   Tift 3x10 Tift 3x10 Red 3x10 Grn 3x10       Standing SAPD   Tift 3x10 Tift 3x10 Red 3x10 Grn 3x10       Robbers   Tift 3x10 Tift 3x10 Tift 3x10 Red 3x10                                                                                                               Modalities             Mechanical traction

## 2021-01-25 ENCOUNTER — OFFICE VISIT (OUTPATIENT)
Dept: PAIN MEDICINE | Facility: MEDICAL CENTER | Age: 56
End: 2021-01-25
Payer: COMMERCIAL

## 2021-01-25 VITALS
WEIGHT: 234 LBS | HEIGHT: 70 IN | TEMPERATURE: 97.2 F | DIASTOLIC BLOOD PRESSURE: 82 MMHG | RESPIRATION RATE: 16 BRPM | SYSTOLIC BLOOD PRESSURE: 135 MMHG | BODY MASS INDEX: 33.5 KG/M2 | HEART RATE: 70 BPM

## 2021-01-25 DIAGNOSIS — M54.2 NECK PAIN: Primary | ICD-10-CM

## 2021-01-25 DIAGNOSIS — M79.18 MYOFASCIAL PAIN SYNDROME: ICD-10-CM

## 2021-01-25 PROCEDURE — 3075F SYST BP GE 130 - 139MM HG: CPT | Performed by: PHYSICAL MEDICINE & REHABILITATION

## 2021-01-25 PROCEDURE — 3008F BODY MASS INDEX DOCD: CPT | Performed by: PHYSICAL MEDICINE & REHABILITATION

## 2021-01-25 PROCEDURE — 3079F DIAST BP 80-89 MM HG: CPT | Performed by: PHYSICAL MEDICINE & REHABILITATION

## 2021-01-25 PROCEDURE — 1036F TOBACCO NON-USER: CPT | Performed by: PHYSICAL MEDICINE & REHABILITATION

## 2021-01-25 PROCEDURE — 99213 OFFICE O/P EST LOW 20 MIN: CPT | Performed by: PHYSICAL MEDICINE & REHABILITATION

## 2021-01-25 NOTE — PROGRESS NOTES
Assessment  1  Neck pain    2  Myofascial pain syndrome        Plan  1  Complete physical therapy program and transition to guided home exercise program with potential follow-up visits with PT as indicated  I would be happy to provide prescription for return to therapy if necessary in the future  2  The patient will begin weaning off of gabapentin at this time by decreasing to twice a day dosing for the next 3 days and then once a day dosing for another 3 days before discontinuing  If the pain returns he should restart the medication and follow-up with us  3  He also had questions about weaning Celebrex  This is being prescribed by Dr Svitlana Chun  I recommend the patient wean 1 medication at a time  He verbalized understanding and agreement  4  He will follow up with us as needed  I did explain that I would follow-up with the interpreting radiologist from his cervical spine MRI as he and his wife were somewhat confused about the normal findings from the MRI and significant foraminal narrowing noted on his x-ray  This may be a result of the obliquity from the x-ray as I do not see significant foraminal changes on the right compared to the left in the MRI however these images are somewhat difficult to interpret  Once I have received information from the radiologist we will pass that on to the patient  My impressions and treatment recommendations were discussed in detail with the patient who verbalized understanding and had no further questions  Discharge instructions were provided  I personally saw and examined the patient and I agree with the above discussed plan of care  No orders of the defined types were placed in this encounter  No orders of the defined types were placed in this encounter        History of Present Illness    Renuka Holt is a 63 y o  male returns in follow-up after obtaining MRI of the cervical spine which was read as completely normal   The patient is experiencing improvement in his symptoms which may be result like the physical therapy  He is interested in weaning off of gabapentin at this point and he may also come off of the Celebrex at some point in the near future as well  He currently rates the pain as a 3/10 and describes constant pain which is worse in the morning and evening and is described as a sharp sensation but is significantly better than it had been  He is planning on returning to work in the near future  He is hopeful that this will be tolerated well and has been reviewing his ergonomics with physical therapy as well  I have personally reviewed and/or updated the patient's past medical history, past surgical history, family history, social history, current medications, allergies, and vital signs today  Review of Systems   Respiratory: Negative for shortness of breath  Cardiovascular: Negative for chest pain  Gastrointestinal: Negative for constipation, diarrhea, nausea and vomiting  Musculoskeletal: Positive for myalgias, neck pain and neck stiffness  Negative for arthralgias, gait problem and joint swelling  Skin: Negative for rash  Neurological: Negative for dizziness, seizures and weakness  All other systems reviewed and are negative        Patient Active Problem List   Diagnosis    Cervical pain (neck)    Esophageal reflux    Facial basal cell cancer    Hyperlipidemia    Vitamin D deficiency    Upper respiratory infection    Coronary artery disease involving native coronary artery    Chest pain due to myocardial ischemia    Right lateral epicondylitis    Tinea pedis of both feet    Cervical radiculitis    Essential hypertension       Past Medical History:   Diagnosis Date    Allergic     Anxiety     Basal cell carcinoma     [facility] ([facility] [address]     Neck pain on right side        Past Surgical History:   Procedure Laterality Date    HEMORRHOID SURGERY         Family History   Problem Relation Age of Onset    No Known Problems Mother     No Known Problems Father        Social History     Occupational History    Not on file   Tobacco Use    Smoking status: Former Smoker    Smokeless tobacco: Never Used   Substance and Sexual Activity    Alcohol use: Yes     Comment: daily    Drug use: Yes     Frequency: 1 0 times per week     Types: Marijuana    Sexual activity: Yes     Partners: Female       Current Outpatient Medications on File Prior to Visit   Medication Sig    amLODIPine (NORVASC) 5 mg tablet Take 1 tablet (5 mg total) by mouth daily    aspirin (ECOTRIN LOW STRENGTH) 81 mg EC tablet Take 81 mg by mouth daily    celecoxib (CeleBREX) 200 mg capsule Take 1 capsule (200 mg total) by mouth 2 (two) times a day    Cholecalciferol (VITAMIN D) 2000 units CAPS Take 2,000 Units by mouth daily     diazepam (VALIUM) 5 mg tablet Take 1 tablet (5 mg total) by mouth every 12 (twelve) hours as needed for anxiety    esomeprazole (NexIUM) 20 mg capsule Take 20 mg by mouth every other day     fexofenadine (ALLEGRA) 180 MG tablet Take 180 mg by mouth daily    gabapentin (NEURONTIN) 300 mg capsule Take 1 capsule (300 mg total) by mouth 3 (three) times a day 1 QHS x 3 days, 1 BID x 3 days, then 1 TID    naloxone (NARCAN) 4 mg/0 1 mL nasal spray Administer 1 spray into a nostril  If no response after 2-3 minutes, give another dose in the other nostril using a new spray   psyllium (METAMUCIL) 58 6 % powder Take 1 packet by mouth 3 (three) times a day    traMADol (ULTRAM) 50 mg tablet Take 1 tablet (50 mg total) by mouth every 6 (six) hours as needed for severe pain    CLOTRIMAZOLE EX Apply topically 2 (two) times a day     econazole nitrate 1 % cream Apply topically daily     No current facility-administered medications on file prior to visit          Allergies   Allergen Reactions    Lamisil Af Defense [Tolnaftate] Hives       Physical Exam    /82   Pulse 70   Temp (!) 97 2 °F (36 2 °C)   Resp 16   Ht 5' 10" (1 778 m) Wt 106 kg (234 lb)   BMI 33 58 kg/m²     Constitutional: normal, well developed, well nourished, alert, in no distress and non-toxic and no overt pain behavior    Eyes: anicteric  HEENT: grossly intact  Neck: supple, symmetric, trachea midline and no masses   Pulmonary:even and unlabored  Psychiatric:Mood and affect appropriate  Neurologic:Cranial Nerves II-XII grossly intact  Musculoskeletal:normal    Imaging

## 2021-01-26 ENCOUNTER — TELEPHONE (OUTPATIENT)
Dept: PAIN MEDICINE | Facility: MEDICAL CENTER | Age: 56
End: 2021-01-26

## 2021-01-26 ENCOUNTER — OFFICE VISIT (OUTPATIENT)
Dept: PHYSICAL THERAPY | Facility: MEDICAL CENTER | Age: 56
End: 2021-01-26
Payer: COMMERCIAL

## 2021-01-26 DIAGNOSIS — M54.2 CERVICAL PAIN (NECK): Primary | ICD-10-CM

## 2021-01-26 PROCEDURE — 97112 NEUROMUSCULAR REEDUCATION: CPT | Performed by: PHYSICAL MEDICINE & REHABILITATION

## 2021-01-26 PROCEDURE — 97140 MANUAL THERAPY 1/> REGIONS: CPT | Performed by: PHYSICAL MEDICINE & REHABILITATION

## 2021-01-26 PROCEDURE — 97110 THERAPEUTIC EXERCISES: CPT | Performed by: PHYSICAL MEDICINE & REHABILITATION

## 2021-01-26 NOTE — PROGRESS NOTES
Daily Note     Today's date: 2021  Patient name: Angelo Ty  : 1965  MRN: [id number]  Referring provider: Howard Bee DO  Dx:   Encounter Diagnosis     ICD-10-CM    1  Cervical pain (neck)  M54 2                   Subjective: Kathy Gordon reported "I am doing better but still around a 3 "      Objective: See treatment diary below      Assessment: Tolerated treatment well  Patient would benefit from continued PT  Kathy Gordon was able to progress his therapy program by increasing his resistance as seen below  Plan: Continue per plan of care        Precautions: none      Manuals 2020      SI right 1-3 ribs             Thoracic pistol Gr  v            c5-7 rotational mob             stm 10min            IASTM [address]      [place]                                                                       Ther Ex             [place] Stretch 4x30" 4x30" 4x30" 4x30" 4x30" 4x30" 4x30"      LS Stretch 4x30" 4x30" 4x30" 4x30" 4x30" 4x30" 4x30"      Scapular Retraction  3x10 3x10 3x10 3x10 3x10 3x10      Standing Rows   [place] 3x10 [place] 3x10 Red 3x10 Grn 3x10 Grn 3x10      Standing SAPD   [place] 3x10 [place] 3x10 Red 3x10 Grn 3x10 Grn 3x10      Robbers   [place] 3x10 [place] 3x10 [place] 3x10 Red 3x10 Grn 3x10                                                                                                              Modalities             Mechanical traction

## 2021-01-26 NOTE — TELEPHONE ENCOUNTER
----- Message from Jim Ferrari DO sent at 1/25/2021  5:06 PM EST -----  Study is reevaluated at the request of the ordering physician        On the right at C3-C4 there is a disc osteophyte complex without focal disc herniation  There is mild to moderate right bony neural foraminal narrowing, correlating to the radiographic findings  Central canal and left neural foramen are patent      C4-5 there is also a disc osteophyte complex with bilateral facet hypertrophy  Mild to moderate right neural foraminal narrowing at this level noted as well  Mild left neural foraminal narrowing  Central canal widely patent  STUDY WAS REVIEWED BY THE INTERPRETING RADIOLOGIST WITH THE ABOVE NOTED FINDINGS  AS THEY ARE MILD TO MODERATE I'M STILL IN FAVOR OF THE SYMPTOMS BEING MAINLY MYOFASCIAL (MUSCULAR) AT THIS TIME  IF HIS SYMPTOMS CHANGE WITH WEANING OF GABAPENTIN WE CAN ADJUST FURTHER RECOMMENDATIONS  AS DISCUSSED AT HIS APPOINTMENT WITH ME TODAY THERE IS NO CLEAR EVIDENCE OF NERVE ROOT IMPINGEMENT FROM THESE IMAGES

## 2021-01-27 NOTE — TELEPHONE ENCOUNTER
S/w pt and advised of JE's notation  Pt verbalized understanding and was appreciative  Pt will call if he has any issues w/ weaning Gabapentin  Pt is going to continue w/ plan as discussed at [address] w/ JE  Pt asked if the addendum to the MRI findings will be available in [facility]  RN advised pt that he should be able to view the addendum, if any issues, call our office  Pt verbalized understanding and was appreciative

## 2021-01-28 ENCOUNTER — APPOINTMENT (OUTPATIENT)
Dept: PHYSICAL THERAPY | Facility: MEDICAL CENTER | Age: 56
End: 2021-01-28
Payer: COMMERCIAL

## 2021-01-29 ENCOUNTER — OFFICE VISIT (OUTPATIENT)
Dept: PHYSICAL THERAPY | Facility: MEDICAL CENTER | Age: 56
End: 2021-01-29
Payer: COMMERCIAL

## 2021-01-29 DIAGNOSIS — M54.2 CERVICAL PAIN (NECK): Primary | ICD-10-CM

## 2021-01-29 PROCEDURE — 97112 NEUROMUSCULAR REEDUCATION: CPT | Performed by: PHYSICAL MEDICINE & REHABILITATION

## 2021-01-29 PROCEDURE — 97140 MANUAL THERAPY 1/> REGIONS: CPT | Performed by: PHYSICAL MEDICINE & REHABILITATION

## 2021-01-29 NOTE — PROGRESS NOTES
Daily Note     Today's date: 2021  Patient name: Jenifer Bella  : 1965  MRN: [id number]  Referring provider: Ryann Lehman DO  Dx:   Encounter Diagnosis     ICD-10-CM    1  Cervical pain (neck)  M54 2                   Subjective: William Perdomo reported "I was doing better but then I was on my friends computer and my pain got way worse "      Objective: See treatment diary below      Assessment: Tolerated treatment well  Patient would benefit from continued PT  William Perdomo reported relief following manual therapy and needed less verbal cueing for scapular strengthening which shows progress towards his goals  Plan: Continue per plan of care        Precautions: none      Manuals 2020     SI right 1-3 ribs             Thoracic pistol Gr  v            c5-7 rotational mob             stm 10min            IASTM [facility] [facility]                                                                      Ther Ex             [place] Stretch 4x30" 4x30" 4x30" 4x30" 4x30" 4x30" 4x30" 4x30"     LS Stretch 4x30" 4x30" 4x30" 4x30" 4x30" 4x30" 4x30" 4x30"     Scapular Retraction  3x10 3x10 3x10 3x10 3x10 3x10 3x10     Standing Rows   [place] 3x10 [place] 3x10 Red 3x10 Grn 3x10 Grn 3x10 Grn 3x10     Standing SAPD   [place] 3x10 [place] 3x10 Red 3x10 Grn 3x10 Grn 3x10 Grn 3x10     Robbers   [place] 3x10 [place] 3x10 [place] 3x10 Red 3x10 Grn 3x10 Grn 3x10                                                                                                             Modalities             Mechanical traction

## 2021-02-01 ENCOUNTER — TELEMEDICINE (OUTPATIENT)
Dept: FAMILY MEDICINE CLINIC | Facility: CLINIC | Age: 56
End: 2021-02-01
Payer: COMMERCIAL

## 2021-02-01 DIAGNOSIS — I10 ESSENTIAL HYPERTENSION: ICD-10-CM

## 2021-02-01 DIAGNOSIS — M54.12 CERVICAL RADICULITIS: Primary | ICD-10-CM

## 2021-02-01 PROCEDURE — 99213 OFFICE O/P EST LOW 20 MIN: CPT | Performed by: FAMILY MEDICINE

## 2021-02-01 PROCEDURE — 1036F TOBACCO NON-USER: CPT | Performed by: FAMILY MEDICINE

## 2021-02-01 NOTE — PROGRESS NOTES
Virtual Regular Visit      Assessment/Plan: guidance given overall  Labs and MRI of the cervical spine reviewed at this time with the patient  Patient is seeing improvement  Patient continue wean off gabapentin  Patient will slowly wean off Celebrex if able to  Patient will follow-up  With physical therapy  The patient will follow with pain management per routine  Note for patient to remain out of work  Patient has been out of work since January 4th and will remain out of work until February 16th  Patient return to work on the 17th of February  Problem List Items Addressed This Visit        Cardiovascular and Mediastinum    Essential hypertension       Nervous and Auditory    Cervical radiculitis - Primary               Reason for visit is   Chief Complaint   Patient presents with    Virtual Regular Visit        Encounter provider Blayne Goddard DO    Provider located at [id number] [place] [id number]      Recent Visits  No visits were found meeting these conditions  Showing recent visits within past 7 days and meeting all other requirements     Today's Visits  Date Type Provider Dept   02/01/21 Telemedicine Alvin Jade DO [address] [facility] today's visits and meeting all other requirements     Future Appointments  No visits were found meeting these conditions  Showing future appointments within next 150 days and meeting all other requirements        The patient was identified by name and date of birth  Saurabh Regan was informed that this is a telemedicine visit and that the visit is being conducted through [facility] and patient was informed that this is a secure, HIPAA-compliant platform  He agrees to proceed     My office door was closed  No one else was in the room  He acknowledged consent and understanding of privacy and security of the video platform   The patient has agreed to participate and understands they can discontinue the visit at any time  Patient is aware this is a billable service  Subjective  Kika Rome is a 63 y o  male   As below   Patient following up on cervical radiculitis, right shoulder pain and hypertension  Patient was seen by pain management and MRI of cervical spine was reviewed  I reviewed MRI of cervical spine with patient at this time including addendum  Patient states pain is decreased to 3/10  Patient has not had injections period patient has been out of work since January 4th  Patient on short-term disability  Patient does not feel ready to return to work yet  Patient was exposed to return to work on February 3rd but would like to return on February 17th  Patient is working with physical therapy and is seeing improvements  Patient is weaning off gabapentin at this time and is down to 300 mg daily  Patient still on Celebrex twice daily    Blood pressure at pain management office visit normal   No other complaints offered by patient       Past Medical History:   Diagnosis Date    Allergic     Anxiety     Basal cell carcinoma     Cancer ([facility] [address]     Neck pain on right side        Past Surgical History:   Procedure Laterality Date    HEMORRHOID SURGERY         Current Outpatient Medications   Medication Sig Dispense Refill    amLODIPine (NORVASC) 5 mg tablet Take 1 tablet (5 mg total) by mouth daily 30 tablet 1    aspirin (ECOTRIN LOW STRENGTH) 81 mg EC tablet Take 81 mg by mouth daily      celecoxib (CeleBREX) 200 mg capsule Take 1 capsule (200 mg total) by mouth 2 (two) times a day 60 capsule 1    Cholecalciferol (VITAMIN D) 2000 units CAPS Take 2,000 Units by mouth daily       CLOTRIMAZOLE EX Apply topically 2 (two) times a day       diazepam (VALIUM) 5 mg tablet Take 1 tablet (5 mg total) by mouth every 12 (twelve) hours as needed for anxiety 30 tablet 0    econazole nitrate 1 % cream Apply topically daily 30 g 1    esomeprazole (NexIUM) 20 mg capsule Take 20 mg by mouth every other day       fexofenadine (ALLEGRA) 180 MG tablet Take 180 mg by mouth daily      gabapentin (NEURONTIN) 300 mg capsule Take 1 capsule (300 mg total) by mouth 3 (three) times a day 1 QHS x 3 days, 1 BID x 3 days, then 1 TID 90 capsule 1    naloxone (NARCAN) 4 mg/0 1 mL nasal spray Administer 1 spray into a nostril  If no response after 2-3 minutes, give another dose in the other nostril using a new spray  1 each 1    psyllium (METAMUCIL) 58 6 % powder Take 1 packet by mouth 3 (three) times a day      traMADol (ULTRAM) 50 mg tablet Take 1 tablet (50 mg total) by mouth every 6 (six) hours as needed for severe pain 15 tablet 0     No current facility-administered medications for this visit  Allergies   Allergen Reactions    Lamisil Af Defense [Tolnaftate] Hives       Review of Systems   Constitutional: Negative  HENT: Negative  Eyes: Negative  Respiratory: Negative  Cardiovascular: Negative  Gastrointestinal: Negative  Endocrine: Negative  Genitourinary: Negative  Musculoskeletal: Positive for neck pain and neck stiffness  Skin: Negative  Allergic/Immunologic: Negative  Neurological: Negative  Hematological: Negative  Psychiatric/Behavioral: Negative  Video Exam    There were no vitals filed for this visit  Physical Exam  Constitutional:       General: He is not in acute distress  Appearance: Normal appearance  He is not ill-appearing, toxic-appearing or diaphoretic  HENT:      Head: Normocephalic and atraumatic  Right Ear: External ear normal       Left Ear: External ear normal    Neck:      Musculoskeletal: Muscular tenderness present  Neurological:      Mental Status: He is alert  Psychiatric:         Behavior: Behavior normal          Thought Content:  Thought content normal          Judgment: Judgment normal           I spent  25 minutes directly with the patient during this visit      VIRTUAL VISIT [facility] acknowledges that he has consented to an online visit or consultation  He understands that the online visit is based solely on information provided by him, and that, in the absence of a face-to-face physical evaluation by the physician, the diagnosis he receives is both limited and provisional in terms of accuracy and completeness  This is not intended to replace a full medical face-to-face evaluation by the physician  Reza Rondon understands and accepts these terms

## 2021-02-01 NOTE — LETTER
February 4, 2021     Patient: Christen Aldana   YOB: 1965   Date of Visit: 2/1/2021       To Whom it May Concern:    Isaac Rosales is under my professional care  He was seen in my office on 2/1/2021  He may return to work on 02/17/2021  If you have any questions or concerns, please don't hesitate to call           Sincerely,          Clem Quan,

## 2021-02-02 ENCOUNTER — APPOINTMENT (OUTPATIENT)
Dept: PHYSICAL THERAPY | Facility: MEDICAL CENTER | Age: 56
End: 2021-02-02
Payer: COMMERCIAL

## 2021-02-05 ENCOUNTER — APPOINTMENT (OUTPATIENT)
Dept: PHYSICAL THERAPY | Facility: MEDICAL CENTER | Age: 56
End: 2021-02-05
Payer: COMMERCIAL

## 2021-02-09 ENCOUNTER — OFFICE VISIT (OUTPATIENT)
Dept: PHYSICAL THERAPY | Facility: MEDICAL CENTER | Age: 56
End: 2021-02-09
Payer: COMMERCIAL

## 2021-02-09 DIAGNOSIS — M54.2 CERVICAL PAIN (NECK): Primary | ICD-10-CM

## 2021-02-09 PROCEDURE — 97140 MANUAL THERAPY 1/> REGIONS: CPT | Performed by: PHYSICAL MEDICINE & REHABILITATION

## 2021-02-09 PROCEDURE — 97110 THERAPEUTIC EXERCISES: CPT | Performed by: PHYSICAL MEDICINE & REHABILITATION

## 2021-02-09 PROCEDURE — 97112 NEUROMUSCULAR REEDUCATION: CPT | Performed by: PHYSICAL MEDICINE & REHABILITATION

## 2021-02-11 ENCOUNTER — OFFICE VISIT (OUTPATIENT)
Dept: PHYSICAL THERAPY | Facility: MEDICAL CENTER | Age: 56
End: 2021-02-11
Payer: COMMERCIAL

## 2021-02-11 DIAGNOSIS — M54.2 CERVICAL PAIN (NECK): Primary | ICD-10-CM

## 2021-02-11 PROCEDURE — 97110 THERAPEUTIC EXERCISES: CPT | Performed by: PHYSICAL MEDICINE & REHABILITATION

## 2021-02-11 PROCEDURE — 97112 NEUROMUSCULAR REEDUCATION: CPT | Performed by: PHYSICAL MEDICINE & REHABILITATION

## 2021-02-11 PROCEDURE — 97140 MANUAL THERAPY 1/> REGIONS: CPT | Performed by: PHYSICAL MEDICINE & REHABILITATION

## 2021-02-11 NOTE — PROGRESS NOTES
Daily Note     Today's date: 2021  Patient name: Jenifer Bella  : 1965  MRN: [id number]  Referring provider: Ryann Lehman DO  Dx:   Encounter Diagnosis     ICD-10-CM    1  Cervical pain (neck)  M54 2                   Subjective: [facility] reported "I am off most of the medications I was taking, I have more pain, but I think that is because I wasn't able to be in here because of the snow "      Objective: See treatment diary below      Assessment: Tolerated treatment well  Patient would benefit from continued PT  [facility] reported a decrease in pain following the session  He was able to add self ball STM at home which shows progress towards his goals  Plan: Continue per plan of care        Precautions: none      Manuals 2020    SI right 1-3 ribs             Thoracic pistol Gr  v            c5-7 rotational mob             stm 10min            IASTM [facility] [facility]                                                                     Ther Ex             [place] Stretch 4x30" 4x30" 4x30" 4x30" 4x30" 4x30" 4x30" 4x30" 4x30    LS Stretch 4x30" 4x30" 4x30" 4x30" 4x30" 4x30" 4x30" 4x30" 4x30    Scapular Retraction  3x10 3x10 3x10 3x10 3x10 3x10 3x10 3x10    Standing Rows   [place] 3x10 [place] 3x10 Red 3x10 Grn 3x10 Grn 3x10 Grn 3x10 Grn 3x10    Standing SAPD   [place] 3x10 [place] 3x10 Red 3x10 Grn 3x10 Grn 3x10 Grn 3x10 Grn 3x10    Robbers   [place] 3x10 [place] 3x10 [place] 3x10 Red 3x10 Grn 3x10 Grn 3x10 Grn 3x10                                                                                                            Modalities             Mechanical traction

## 2021-02-11 NOTE — PROGRESS NOTES
Daily Note     Today's date: 2021  Patient name: Juve Paul  : 1965  MRN: [id number]  Referring provider: Ken Bay DO  Dx:   Encounter Diagnosis     ICD-10-CM    1  Cervical pain (neck)  M54 2                   Subjective: Samia Fitzgerald reported "I am felt really good when I left but some of it came back "      Objective: See treatment diary below      Assessment: Tolerated treatment well  Patient would benefit from continued PT  Samia Fitzgerald was able to increase his resistance to blue for his scapular strengthening which shows progress towards his goals  He continues to report relief following manual therapy  Plan: Continue per plan of care        Precautions: none      Manuals 2020   SI right 1-3 ribs             Thoracic pistol Gr  v            c5-7 rotational mob             stm 10min            IASTM [facility] [facility]                                                                    Ther Ex             [place] Stretch 4x30" 4x30" 4x30" 4x30" 4x30" 4x30" 4x30" 4x30" 4x30 4x30"   LS Stretch 4x30" 4x30" 4x30" 4x30" 4x30" 4x30" 4x30" 4x30" 4x30 4x30"   Scapular Retraction  3x10 3x10 3x10 3x10 3x10 3x10 3x10 3x10 3x10   Standing Rows   [place] 3x10 [place] 3x10 Red 3x10 Grn 3x10 Grn 3x10 Grn 3x10 Grn 3x10 Blue 3x10   Standing SAPD   [place] 3x10 [place] 3x10 Red 3x10 Grn 3x10 Grn 3x10 Grn 3x10 Grn 3x10 Blue 3x10   Robbers   [place] 3x10 [place] 3x10 [place] 3x10 Red 3x10 Grn 3x10 Grn 3x10 Grn 3x10 Blue 3x10                                                                                                           Modalities             Mechanical traction

## 2021-02-15 ENCOUNTER — TELEPHONE (OUTPATIENT)
Dept: FAMILY MEDICINE CLINIC | Facility: CLINIC | Age: 56
End: 2021-02-15

## 2021-02-16 ENCOUNTER — APPOINTMENT (OUTPATIENT)
Dept: PHYSICAL THERAPY | Facility: MEDICAL CENTER | Age: 56
End: 2021-02-16
Payer: COMMERCIAL

## 2021-02-18 ENCOUNTER — OFFICE VISIT (OUTPATIENT)
Dept: PHYSICAL THERAPY | Facility: MEDICAL CENTER | Age: 56
End: 2021-02-18
Payer: COMMERCIAL

## 2021-02-18 DIAGNOSIS — M54.2 CERVICAL PAIN (NECK): Primary | ICD-10-CM

## 2021-02-18 PROCEDURE — 97112 NEUROMUSCULAR REEDUCATION: CPT | Performed by: PHYSICAL MEDICINE & REHABILITATION

## 2021-02-18 PROCEDURE — 97140 MANUAL THERAPY 1/> REGIONS: CPT | Performed by: PHYSICAL MEDICINE & REHABILITATION

## 2021-02-18 PROCEDURE — 97110 THERAPEUTIC EXERCISES: CPT | Performed by: PHYSICAL MEDICINE & REHABILITATION

## 2021-02-18 NOTE — PROGRESS NOTES
Daily Note     Today's date: 2021  Patient name: Bucky Vera  : 1965  MRN: [id number]  Referring provider: Semja Piña DO  Dx:   Encounter Diagnosis     ICD-10-CM    1  Cervical pain (neck)  M54 2                   Subjective: Sebastian Sanchez reported "I have pain in a different spot today, due to working at home "      Objective: See treatment diary below      Assessment: Tolerated treatment well  Patient would benefit from continued PT  Sebastian Sanchez reported relief following manual therapy  He demonstrated good form during his scapular strengthening exercises  He was able to progress his HEP which shows progress towards his goals  Plan: Continue per plan of care        Precautions: none      Manuals 2021   SI right 1-3 ribs             Thoracic pistol             c5-7 rotational mob             stm [address] [facility] [address]   IASTM             [facility] [place]                                                                    Ther Ex             [place] Stretch 4x30"  4x30" 4x30" 4x30" 4x30" 4x30" 4x30" 4x30 4x30"    Stretch 4x30"  4x30" 4x30" 4x30" 4x30" 4x30" 4x30" 4x30 4x30"   Scapular Retraction 3x10  3x10 3x10 3x10 3x10 3x10 3x10 3x10 3x10   Standing Rows Blue 3x10  [place] 3x10 Hunt 3x10 Red 3x10 Grn 3x10 Grn 3x10 Grn 3x10 Grn 3x10 Blue 3x10   Standing SAPD Blue 3x10  [place] 3x10 [place] 3x10 Red 3x10 Grn 3x10 Grn 3x10 Grn 3x10 Grn 3x10 Blue 3x10   Robbers Blue 3x10  [place] 3x10 [place] 3x10 [place] 3x10 Red 3x10 Grn 3x10 Grn 3x10 Grn 3x10 Blue 3x10                                                                                                           Modalities             Mechanical traction

## 2021-02-22 DIAGNOSIS — M54.6 ACUTE RIGHT-SIDED THORACIC BACK PAIN: ICD-10-CM

## 2021-02-22 DIAGNOSIS — M54.12 CERVICAL RADICULITIS: ICD-10-CM

## 2021-02-22 RX ORDER — CELECOXIB 200 MG/1
CAPSULE ORAL
Qty: 60 CAPSULE | Refills: 1 | Status: SHIPPED | OUTPATIENT
Start: 2021-02-22

## 2021-02-25 ENCOUNTER — OFFICE VISIT (OUTPATIENT)
Dept: PHYSICAL THERAPY | Facility: MEDICAL CENTER | Age: 56
End: 2021-02-25
Payer: COMMERCIAL

## 2021-02-25 DIAGNOSIS — M54.2 CERVICAL PAIN (NECK): Primary | ICD-10-CM

## 2021-02-25 PROCEDURE — 97112 NEUROMUSCULAR REEDUCATION: CPT | Performed by: PHYSICAL MEDICINE & REHABILITATION

## 2021-02-25 PROCEDURE — 97110 THERAPEUTIC EXERCISES: CPT | Performed by: PHYSICAL MEDICINE & REHABILITATION

## 2021-02-25 PROCEDURE — 97140 MANUAL THERAPY 1/> REGIONS: CPT | Performed by: PHYSICAL MEDICINE & REHABILITATION

## 2021-02-26 ENCOUNTER — APPOINTMENT (OUTPATIENT)
Dept: PHYSICAL THERAPY | Facility: MEDICAL CENTER | Age: 56
End: 2021-02-26
Payer: COMMERCIAL

## 2021-02-26 NOTE — PROGRESS NOTES
Daily Note     Today's date: 2021  Patient name: Christen Aldana  : 1965  MRN: [id number]  Referring provider: Elier Chamorro DO  Dx:   Encounter Diagnosis     ICD-10-CM    1  Cervical pain (neck)  M54 2                   Subjective: Chicho reported "After last session I felt much better "      Objective: See treatment diary below      Assessment: Tolerated treatment well  Patient would benefit from continued PT  Due to his subjective report of last session being helpful the same techniques and intensity were utilized again today  He continues to report relief following manual therapy  Plan: Continue per plan of care        Precautions: none      Manuals 2021   SI right 1-3 ribs             Thoracic pistol             c5-7 rotational mob             [address]   IAS             P-A [place]                                                                    Ther Ex             [place] Stretch 4x30" 4x30"  4x30" 4x30" 4x30" 4x30" 4x30" 4x30 4x30"   LS Stretch 4x30" 4x30"  4x30" 4x30" 4x30" 4x30" 4x30" 4x30 4x30"   Scapular Retraction 3x10 3x10  3x10 3x10 3x10 3x10 3x10 3x10 3x10   Standing Rows Blue 3x10 Blue 3x10  [place] 3x10 Red 3x10 Grn 3x10 Grn 3x10 Grn 3x10 Grn 3x10 Blue 3x10   Standing SAPD Blue 3x10 Blue 3x10  [place] 3x10 Red 3x10 Grn 3x10 Grn 3x10 Grn 3x10 Grn 3x10 Blue 3x10   Robbers Blue 3x10 Blue 3x10  [place] 3x10 [place]osh 3x10 Red 3x10 Grn 3x10 Grn 3x10 Grn 3x10 Blue 3x10                                                                                                           Modalities             Mechanical traction

## 2021-03-02 ENCOUNTER — OFFICE VISIT (OUTPATIENT)
Dept: PHYSICAL THERAPY | Facility: MEDICAL CENTER | Age: 56
End: 2021-03-02
Payer: COMMERCIAL

## 2021-03-02 DIAGNOSIS — M54.2 CERVICAL PAIN (NECK): Primary | ICD-10-CM

## 2021-03-02 PROCEDURE — 97112 NEUROMUSCULAR REEDUCATION: CPT | Performed by: PHYSICAL MEDICINE & REHABILITATION

## 2021-03-02 PROCEDURE — 97110 THERAPEUTIC EXERCISES: CPT | Performed by: PHYSICAL MEDICINE & REHABILITATION

## 2021-03-02 PROCEDURE — 97140 MANUAL THERAPY 1/> REGIONS: CPT | Performed by: PHYSICAL MEDICINE & REHABILITATION

## 2021-03-04 NOTE — PROGRESS NOTES
PT Discharge    Today's date: 3/2/2021  Patient name: Jd Aldana  : 1965  MRN: [id number]  Referring provider: Chele Corey DO  Dx:   Encounter Diagnosis     ICD-10-CM    1  Cervical pain (neck)  M54 2                   Assessment/Plan    Patient is a very pleasant 62 yo male who has made excellent progress towards his goals  Due to his progress and self effacy with his HEP he is being D/Garry at this time  Subjective    Patient states that "I am feeling much better and I am ready to be discharged "    Objective    Red Flags: none  Pain: 8 5/10 down to 5/10 following mobilization  Reflexes: 2+BUE  Posture: forward head rounded shoulders  Dramatic tightness of upper traps and surrounding musculature on right  AROM: limited cervical motion by 75%  Shoulder motion is normal   PROM: deferred  PAROM: diminished throughout cervical spine however difficult to test due to spasm  Palpation: ttp throughout neck and upper back musculature  Special Tests: + shoulder abduction test, - compression - distraction, - spurlings A   DNF unable to perform due to pain   Coordination of Movement/strengthe: Patient demonstrates poor activation of Longus Capitus and Longus Coli with loss of feed forward mechanism with reaching tasks  Goals  - Pt I with initial HEP in 1-2 visits  - Improve ROM to functional without pain  - Improved Longus Coli and Longus Capitus activation and return of feed forward mechanism   - Increase Functional Status Measure measured by FOTO by MDIC  - reduce pain to 3/10         Flowsheet Rows      Most Recent Value   PT/OT G-Codes   Current Score  59   Projected Score  73             Plan: Continue per plan of care        Precautions: none      Manuals 2021 2021 3/2/2021  2021 2021 2021 2021 2021 2021   SI right 1-3 ribs             Thoracic pistol             c5-7 rotational mob             [address] P-A JR JR JR  JR JR Terrence Karla                                                                    Ther Ex             [place] Stretch 4x30" 4x30" 4x30"  4x30" 4x30" 4x30" 4x30" 4x30 4x30"   LS Stretch 4x30" 4x30" 4x30"  4x30" 4x30" 4x30" 4x30" 4x30 4x30"   Scapular Retraction 3x10 3x10 3x10  3x10 3x10 3x10 3x10 3x10 3x10   Standing Rows Blue 3x10 Blue 3x10 Blue 3x10  Red 3x10 Grn 3x10 Grn 3x10 Grn 3x10 Grn 3x10 Blue 3x10   Standing SAPD Blue 3x10 Blue 3x10 Blue 3x10  Red 3x10 Grn 3x10 Grn 3x10 Grn 3x10 Grn 3x10 Blue 3x10   Robbers Blue 3x10 Blue 3x10 Blue 3x10  [place] 3x10 Red 3x10 Grn 3x10 Grn 3x10 Grn 3x10 Blue 3x10                                                                                                           Modalities             Mechanical traction

## 2021-03-30 DIAGNOSIS — Z23 ENCOUNTER FOR IMMUNIZATION: ICD-10-CM

## 2021-04-15 ENCOUNTER — IMMUNIZATIONS (OUTPATIENT)
Dept: FAMILY MEDICINE CLINIC | Facility: HOSPITAL | Age: 56
End: 2021-04-15

## 2021-04-15 DIAGNOSIS — Z23 ENCOUNTER FOR IMMUNIZATION: Primary | ICD-10-CM

## 2021-04-15 PROCEDURE — 0011A SARS-COV-2 / COVID-19 MRNA VACCINE (MODERNA) 100 MCG: CPT

## 2021-04-15 PROCEDURE — 91301 SARS-COV-2 / COVID-19 MRNA VACCINE (MODERNA) 100 MCG: CPT

## 2021-05-20 ENCOUNTER — IMMUNIZATIONS (OUTPATIENT)
Dept: FAMILY MEDICINE CLINIC | Facility: HOSPITAL | Age: 56
End: 2021-05-20

## 2021-05-20 DIAGNOSIS — Z23 ENCOUNTER FOR IMMUNIZATION: Primary | ICD-10-CM

## 2021-05-20 PROCEDURE — 0012A SARS-COV-2 / COVID-19 MRNA VACCINE (MODERNA) 100 MCG: CPT

## 2021-05-20 PROCEDURE — 91301 SARS-COV-2 / COVID-19 MRNA VACCINE (MODERNA) 100 MCG: CPT

## 2023-09-14 ENCOUNTER — TELEMEDICINE (OUTPATIENT)
Dept: FAMILY MEDICINE CLINIC | Facility: CLINIC | Age: 58
End: 2023-09-14
Payer: COMMERCIAL

## 2023-09-14 VITALS — TEMPERATURE: 98.1 F

## 2023-09-14 DIAGNOSIS — U07.1 COVID-19: Primary | ICD-10-CM

## 2023-09-14 DIAGNOSIS — E78.2 MIXED HYPERLIPIDEMIA: ICD-10-CM

## 2023-09-14 DIAGNOSIS — Z11.4 SCREENING FOR HIV (HUMAN IMMUNODEFICIENCY VIRUS): ICD-10-CM

## 2023-09-14 DIAGNOSIS — Z11.59 NEED FOR HEPATITIS C SCREENING TEST: ICD-10-CM

## 2023-09-14 DIAGNOSIS — I25.10 CORONARY ARTERY DISEASE INVOLVING NATIVE CORONARY ARTERY OF NATIVE HEART WITHOUT ANGINA PECTORIS: ICD-10-CM

## 2023-09-14 DIAGNOSIS — E55.9 VITAMIN D DEFICIENCY: ICD-10-CM

## 2023-09-14 PROCEDURE — 99213 OFFICE O/P EST LOW 20 MIN: CPT | Performed by: STUDENT IN AN ORGANIZED HEALTH CARE EDUCATION/TRAINING PROGRAM

## 2023-09-14 RX ORDER — MOLNUPIRAVIR 200 MG/1
800 CAPSULE ORAL EVERY 12 HOURS
Qty: 40 CAPSULE | Refills: 0 | Status: SHIPPED | OUTPATIENT
Start: 2023-09-14 | End: 2023-09-19

## 2023-09-14 NOTE — PROGRESS NOTES
COVID-19 Outpatient Progress Note    Assessment/Plan:    Problem List Items Addressed This Visit        Cardiovascular and Mediastinum    Coronary artery disease involving native coronary artery    Relevant Orders    HEMOGLOBIN A1C W/ EAG ESTIMATION       Other    Hyperlipidemia    Relevant Orders    Lipid Panel with Direct LDL reflex    CBC and differential    Comprehensive metabolic panel    HEMOGLOBIN A1C W/ EAG ESTIMATION    Vitamin D deficiency    Relevant Orders    Vitamin D 25 hydroxy   Other Visit Diagnoses     COVID-19    -  Primary    Relevant Medications    molnupiravir (Lagevrio) 200 mg capsule    Other Relevant Orders    CBC and differential    Need for hepatitis C screening test        Relevant Orders    Hepatitis C Antibody    Screening for HIV (human immunodeficiency virus)        Relevant Orders    HIV 1/2 AG/AB w Reflex [facility] for 2 yr old and above         Disposition:     Discussed symptom directed medication options with patient. Patient meets criteria for Molnupiravir and they have been counseled according to EUA documentation provided by the FDA. After discussion, patient agrees to treatment. Ely Friar is an investigational medicine used to treat mild-to-moderate COVID-19 in adults with positive results of direct SARS-CoV-2 viral testing, and who are at high risk for progressing to severe COVID-19 including hospitalization or death, and for whom other COVID-19 treatment options authorized by the FDA are not accessible or clinically appropriate. The FDA has authorized the emergency use of molnupiravir for the treatment of mild-tomoderate COVID-19 in adults under an EUA. Ely Friar is not authorized:  - for use in people less than 25years of age.  - for prevention of COVID-19.  - for people needing hospitalization for COVID-19.  - for use for longer than 5 consecutive days    What is the most important information I should know about molnupiravir?     Ely Friar may cause serious side effects, including:    Molnupiravir may cause harm to your unborn baby. It is not known if molnupiravir will harm your baby if you take molnupiravir during pregnancy. - Marguerite Glaze is not recommended for use in pregnancy. - Marguerite Glaze has not been studied in pregnancy. Marguerite Glaze was studied in pregnant animals only. When molnupiravir was given to pregnant animals, molnupiravir caused harm to their unborn babies.  - You and your healthcare provider may decide that you should take molnupiravir duringpregnancy if there are no other COVID-19 treatment options authorized by the FDA that are accessible or clinically appropriate for you. - If you and your healthcare provider decide that you should take molnupiravir during pregnancy, you and your healthcare provider should discuss the known and potential benefits and the potential risks of taking molnupiravir during pregnancy. For individuals who are able to become pregnant:  - You should use a reliable method of birth control (contraception) consistently and correctly during treatment with molnupiravir and for 4 days after the last dose of molnupiravir. Talk to your healthcare provider about reliable birth control methods. - Before starting treatment with molnupiravir your healthcare provider may do a pregnancy test to see if you are pregnant before starting treatment with molnupiravir. - Tell your healthcare provider right away if you become pregnant or think you may be pregnant during treatment with molnupiravir. Pregnancy Surveillance Program:  - There is a pregnancy surveillance program for individuals who take molnupiravir during pregnancy. The purpose of this program is to collect information about the health of you and your baby.  Talk to your healthcare provider about how to take part in this program.  - If you take molnupiravir during pregnancy and you agree to participate in the pregnancy surveillance program and allow your healthcare provider to share your information with Boni Womack, then your healthcare provider will report your use of molnupiravir during pregnancy to [id number] [place] [address]. by calling [phone] or [email or web address]. For individuals who are sexually active with partners who are able to become pregnant:  - It is not known if molnupiravir can affect sperm. While the risk is regarded as low, animal studies to fully assess the potential for molnupiravir to affect the babies of males treated with molnupiravir have not been completed. A reliable method of birth control (contraception) should be used consistently and correctly during treatment with molnupiravir and for at least 3 months after the last dose. The risk to sperm beyond 3 months is not known. Studies to understand the risk to sperm beyond 3 months are ongoing. Talk to your healthcare provider about reliable birth control methods. Talk to your healthcare provider if you have questions or concerns about how molnupiravir may affect sperm. How do I take molnupiravir? - Take molnupiravir exactly as your healthcare provider tells you to take it. - Take 4 capsules of molnupiravir every 12 hours (for example, at 8 am and at 8 pm)  - Take molnupiravir for 5 days. It is important that you complete the full 5 days of treatment with molnupiravir. Do not stop taking molnupiravir before you complete the full 5 days of treatment, even if you feel better. - Take molnupiravir with or without food. - You should stay in isolation for as long as your healthcare provider tells you to. Talk to your healthcare provider if you are not sure about how to properly isolate while you have COVID-19.  - Swallow molnupiravir capsules whole. Do not open, break, or crush the capsules. If you cannot swallow capsules whole, tell your healthcare provider.     What to do if you miss a dose:  - If it has been less than 10 hours since the missed dose, take it as soon as you remember  - If it has been more than 10 hours since the missed dose, skip the missed dose and take your dose at the next scheduled time. - Do not double the dose of molnupiravir to make up for a missed dose. What are the important possible side effects of molnupiravir? Possible side effects of molnupiravir are:  - See, Cristin Gabe is the most important information I should know about molnupiravir?”  - Diarrhea  - Nausea  - Dizziness    These are not all the possible side effects of molnupiravir. Not many people have taken molnupiravir. Serious and unexpected side effects may happen. This medicine is still being studied, so it is possible that all of the risks are not known at this time. What other treatment choices are there? Like Clari Cap may allow for the emergency use of other medicines to treat people with COVID-19. Go to [employer]. for more information. It is your choice to be treated or not to be treated with molnupiravir. Should you decide not to take it, it will not change your standard medical care. What if I am breastfeeding? Breastfeeding is not recommended during treatment with molnupiravir and for 4 days after the last dose of molnupiravir. If you are breastfeeding or plan to breastfeed, talk to your healthcare provider about your options and specific situation before taking molnupiravir. How do I report side effects with molnupiravir? Contact your healthcare provider if you have any side effects that bother you or do not go away. Report side effects to FDA MedWatch at www.fda.gov/medwatch or call 8-755-YLQ-2730 (1-750.463.9780). How should I store [address] [place] [address]? - Store molnupiravir capsules at room temperature between 68°F to 77°F (20°C to 25°C). - Keep molnupiravir and all medicines out of the reach of children and pets.     Full fact sheet for patients, parents, and caregivers can be found at: [employer].au    I have spent a total time of 20 minutes on the day of the encounter for this patient including     Patient has not had blood work since 2019 - ordered blood work patient is due for and advised to schedule yearly physical in 2 weeks. Encounter provider: William Wilson MD     Provider located at: [address] [facility]  [address] [place] [address]     Recent Visits  No visits were found meeting these conditions. Showing recent visits within past 7 days and meeting all other requirements  Today's Visits  Date Type Provider Dept   09/14/23 Telemedicine William Wilson MD [address] [facility] today's visits and meeting all other requirements  Future Appointments  No visits were found meeting these conditions. Showing future appointments within next 150 days and meeting all other requirements     This virtual check-in was done via telephone and he agrees to proceed. Patient agrees to participate in a virtual check in via telephone or video visit instead of presenting to the office to address urgent/immediate medical needs. Patient is aware this is a billable service. He acknowledged consent and understanding of privacy and security of the video platform. The patient has agreed to participate and understands they can discontinue the visit at any time. After connecting through Telephone, the patient was identified by name and date of birth. Jaylin Austin was informed that this was a telemedicine visit and that the exam was being conducted confidentially over secure lines. Jaylin Austin acknowledged consent and understanding of privacy and security of the telemedicine visit. I informed the patient that I have reviewed his record in Epic and presented the opportunity for him to ask any questions regarding the visit today.  The patient agreed to participate. Verification of patient location:  Patient is located in the following state in which I hold an active license: [place]    Subjective: Mora Plunkett is a 60 y.o. male who has been screened for COVID-19. Patient's symptoms include chills, nasal congestion, rhinorrhea, shortness of breath, chest tightness and myalgias. Patient denies fatigue, sore throat, anosmia, loss of taste, cough, abdominal pain, nausea, vomiting, diarrhea and headaches. COVID-19 vaccination status: Fully vaccinated with booster    No results found for: "Vladimir Lang", "[facility]", "Hetal Boehringer", "CORONAVIRUSR", "[facility]", "[facility]"    Review of Systems   Constitutional: Positive for chills. Negative for fatigue. HENT: Positive for congestion and rhinorrhea. Negative for sore throat. Respiratory: Positive for chest tightness and shortness of breath. Negative for cough. Gastrointestinal: Negative for abdominal pain, diarrhea, nausea and vomiting. Musculoskeletal: Positive for myalgias. Neurological: Negative for headaches.      Current Outpatient Medications on File Prior to Visit   Medication Sig   • aspirin (ECOTRIN LOW STRENGTH) 81 mg EC tablet Take 81 mg by mouth daily   • Cholecalciferol (VITAMIN D) 2000 units CAPS Take 2,000 Units by mouth daily    • esomeprazole (NexIUM) 20 mg capsule Take 20 mg by mouth every other day    • fexofenadine (ALLEGRA) 180 MG tablet Take 180 mg by mouth daily   • psyllium (METAMUCIL) 58.6 % powder Take 1 packet by mouth 3 (three) times a day   • amLODIPine (NORVASC) 5 mg tablet Take 1 tablet (5 mg total) by mouth daily   • celecoxib (CeleBREX) 200 mg capsule TAKE 1 CAPSULE BY MOUTH TWICE A DAY   • CLOTRIMAZOLE EX Apply topically 2 (two) times a day    • diazepam (VALIUM) 5 mg tablet Take 1 tablet (5 mg total) by mouth every 12 (twelve) hours as needed for anxiety   • econazole nitrate 1 % cream Apply topically daily   • gabapentin (NEURONTIN) 300 mg capsule Take 1 capsule (300 mg total) by mouth 3 (three) times a day 1 QHS x 3 days, 1 BID x 3 days, then 1 TID   • naloxone (NARCAN) 4 mg/0.1 mL nasal spray Administer 1 spray into a nostril. If no response after 2-3 minutes, give another dose in the other nostril using a new spray.    • traMADol (ULTRAM) 50 mg tablet Take 1 tablet (50 mg total) by mouth every 6 (six) hours as needed for severe pain       Objective:    Temp 98.1 °F (36.7 °C)        Sheri Bennett MD

## 2023-09-14 NOTE — LETTER
[address] [address] [place] [address] [facility] [address]  Dept: [phone]    September 14, 2023    Patient: Cele Weir  YOB: 1965    Cele Weir was seen and evaluated at our [facility]. His Covid test is positive, he may return to work on 9/18/2023, as this is 5 days from the onset of symptoms. Upon return, they must then adhere to strict masking for an additional 5 days.     Sincerely,    Aldo Gomez MD

## 2024-05-28 ENCOUNTER — OFFICE VISIT (OUTPATIENT)
Dept: FAMILY MEDICINE CLINIC | Facility: CLINIC | Age: 59
End: 2024-05-28
Payer: COMMERCIAL

## 2024-05-28 VITALS
WEIGHT: 233 LBS | HEART RATE: 68 BPM | TEMPERATURE: 98.3 F | DIASTOLIC BLOOD PRESSURE: 80 MMHG | HEIGHT: 70 IN | SYSTOLIC BLOOD PRESSURE: 130 MMHG | OXYGEN SATURATION: 98 % | BODY MASS INDEX: 33.36 KG/M2

## 2024-05-28 DIAGNOSIS — J06.9 UPPER RESPIRATORY TRACT INFECTION, UNSPECIFIED TYPE: Primary | ICD-10-CM

## 2024-05-28 PROCEDURE — 99213 OFFICE O/P EST LOW 20 MIN: CPT | Performed by: STUDENT IN AN ORGANIZED HEALTH CARE EDUCATION/TRAINING PROGRAM

## 2024-05-28 RX ORDER — AZITHROMYCIN 250 MG/1
TABLET, FILM COATED ORAL
Qty: 6 TABLET | Refills: 0 | Status: SHIPPED | OUTPATIENT
Start: 2024-05-28 | End: 2024-06-02

## 2024-05-28 NOTE — PROGRESS NOTES
"Ambulatory Visit  Name: Dario Tolliver      : 1965      MRN: [id number]  Encounter Provider: Chago Myles MD  Encounter Date: 2024   Encounter department: [facility]    Assessment & Plan   1. Upper respiratory tract infection, unspecified type  -     azithromycin (Zithromax) 250 mg tablet; Take 2 tablets (500 mg total) by mouth daily for 1 day, THEN 1 tablet (250 mg total) daily for 4 days.    Given patient's symptoms and history, prescription for Z-Donald sent to pharmacy.  Recommended continued supportive care, including rest, adequate hydration, and over-the-counter medicine as needed.  Advised patient to follow-up with office if symptoms worsen or fail to improve.       History of Present Illness     URI   This is a new problem. The current episode started in the past 7 days. The problem has been gradually improving. There has been no fever. Associated symptoms include congestion, coughing, rhinorrhea, sinus pain and wheezing. Pertinent negatives include no abdominal pain, diarrhea, dysuria, headaches, nausea, sneezing, sore throat or vomiting. He has tried acetaminophen (pseudophed, robutussin) for the symptoms. The treatment provided mild relief.     Review of Systems   HENT:  Positive for congestion, rhinorrhea and sinus pain. Negative for sneezing and sore throat.    Respiratory:  Positive for cough and wheezing.    Gastrointestinal:  Negative for abdominal pain, diarrhea, nausea and vomiting.   Genitourinary:  Negative for dysuria.   Neurological:  Negative for headaches.       Objective     /80 (BP Location: Right arm, Patient Position: Sitting, Cuff Size: Standard)   Pulse 68   Temp 98.3 °F (36.8 °C) (Temporal)   Ht 5' 10\" (1.778 m)   Wt 106 kg (233 lb)   SpO2 98%   BMI 33.43 kg/m²     Physical Exam  Constitutional:       Appearance: Normal appearance.   HENT:      Head: Normocephalic and atraumatic.      Nose: Congestion present. No rhinorrhea.    "   Mouth/Throat:      Mouth: Mucous membranes are moist.      Pharynx: Oropharynx is clear. Posterior oropharyngeal erythema present. No oropharyngeal exudate.   Cardiovascular:      Rate and Rhythm: Normal rate and regular rhythm.      Heart sounds: Normal heart sounds. No murmur heard.  Pulmonary:      Breath sounds: Normal breath sounds. No wheezing.   Abdominal:      Palpations: Abdomen is soft.      Tenderness: There is no abdominal tenderness. There is no guarding or rebound.   Musculoskeletal:      Right lower leg: No edema.      Left lower leg: No edema.   Neurological:      Mental Status: He is alert and oriented to person, place, and time.   Psychiatric:         Mood and Affect: Mood normal.         Behavior: Behavior normal.       Administrative Statements

## 2024-05-30 ENCOUNTER — TELEPHONE (OUTPATIENT)
Age: 59
End: 2024-05-30

## 2024-05-30 DIAGNOSIS — R06.02 SHORTNESS OF BREATH: Primary | ICD-10-CM

## 2024-05-30 RX ORDER — ALBUTEROL SULFATE 90 UG/1
2 AEROSOL, METERED RESPIRATORY (INHALATION) EVERY 6 HOURS PRN
Qty: 18 G | Refills: 0 | Status: SHIPPED | OUTPATIENT
Start: 2024-05-30

## 2024-05-30 NOTE — TELEPHONE ENCOUNTER
Patient has been advised inhaler was called in. Patient verbalized understanding and agrees. No further concerns, ng

## 2024-05-30 NOTE — TELEPHONE ENCOUNTER
Patient reports his symptoms are becoming slightly worse (increased chest congestion ans SOB at times) and he would like to know if it is possible to have Albuterol ordered. Patient would like a call back to advise.

## 2024-06-06 ENCOUNTER — OFFICE VISIT (OUTPATIENT)
Dept: FAMILY MEDICINE CLINIC | Facility: CLINIC | Age: 59
End: 2024-06-06
Payer: COMMERCIAL

## 2024-06-06 VITALS
HEIGHT: 70 IN | OXYGEN SATURATION: 98 % | HEART RATE: 61 BPM | DIASTOLIC BLOOD PRESSURE: 80 MMHG | TEMPERATURE: 98.2 F | WEIGHT: 231 LBS | SYSTOLIC BLOOD PRESSURE: 120 MMHG | BODY MASS INDEX: 33.07 KG/M2

## 2024-06-06 DIAGNOSIS — J40 BRONCHITIS: Primary | ICD-10-CM

## 2024-06-06 PROCEDURE — 99213 OFFICE O/P EST LOW 20 MIN: CPT | Performed by: FAMILY MEDICINE

## 2024-06-06 RX ORDER — LEVOFLOXACIN 500 MG/1
500 TABLET, FILM COATED ORAL EVERY 24 HOURS
Qty: 7 TABLET | Refills: 0 | Status: SHIPPED | OUTPATIENT
Start: 2024-06-06 | End: 2024-06-13

## 2024-06-06 NOTE — PROGRESS NOTES
"Assessment/Plan: Patient use Levaquin as directed.  Patient will continue with albuterol and Robitussin DM as directed.  Patient will rest and increase fluids.  Follow-up as needed       Diagnoses and all orders for this visit:    Bronchitis  -     levofloxacin (LEVAQUIN) 500 mg tablet; Take 1 tablet (500 mg total) by mouth every 24 hours for 7 days            Subjective:        Patient ID: Dario Tolliver is a 59 y.o. male.      Patient is here to follow-up on sinusitis and now has shortness of breath and cough which is worsening.  Sinus issues have improved.  Patient does have clear rhinorrhea.  Sore throat has improved.  No fever at this time.  Patient sputum is productive.  Patient did complete Z-Donald.  Patient using Robitussin.  Patient using albuterol.     Shortness of Breath  Associated symptoms include coughing and rhinorrhea.         The following portions of the patient's history were reviewed and updated as appropriate: allergies, current medications, past family history, past medical history, past social history, past surgical history and problem list.      Review of Systems   Constitutional: Negative.    HENT:  Positive for rhinorrhea.    Eyes: Negative.    Respiratory:  Positive for cough and shortness of breath.    Cardiovascular: Negative.    Gastrointestinal: Negative.    Endocrine: Negative.    Genitourinary: Negative.    Musculoskeletal: Negative.    Skin: Negative.    Allergic/Immunologic: Negative.    Neurological: Negative.    Hematological: Negative.    Psychiatric/Behavioral: Negative.             Objective:               /80 (BP Location: Right arm, Patient Position: Sitting, Cuff Size: Standard)   Pulse 61   Temp 98.2 °F (36.8 °C) (Temporal)   Ht 5' 10\" (1.778 m)   Wt 105 kg (231 lb)   SpO2 98%   BMI 33.15 kg/m²          Physical Exam  Vitals and nursing note reviewed.   Constitutional:       General: He is not in acute distress.     Appearance: He is ill-appearing. He is not " toxic-appearing or diaphoretic.   HENT:      Head: Normocephalic and atraumatic.      Right Ear: Tympanic membrane, ear canal and external ear normal. There is no impacted cerumen.      Left Ear: Tympanic membrane, ear canal and external ear normal. There is no impacted cerumen.      Nose: Nose normal. No congestion or rhinorrhea.      Mouth/Throat:      Mouth: Mucous membranes are moist.      Pharynx: Oropharyngeal exudate present. No posterior oropharyngeal erythema.   Eyes:      General: No scleral icterus.        Right eye: No discharge.         Left eye: No discharge.   Neck:      Vascular: No carotid bruit.   Cardiovascular:      Rate and Rhythm: Normal rate and regular rhythm.      Pulses: Normal pulses.      Heart sounds: Normal heart sounds. No murmur heard.     No friction rub. No gallop.   Pulmonary:      Effort: Pulmonary effort is normal. No respiratory distress.      Breath sounds: Normal breath sounds. No stridor. No wheezing, rhonchi or rales.   Chest:      Chest wall: No tenderness.   Musculoskeletal:         General: No swelling, tenderness, deformity or signs of injury. Normal range of motion.      Cervical back: Normal range of motion and neck supple. No rigidity. No muscular tenderness.      Right lower leg: No edema.      Left lower leg: No edema.   Lymphadenopathy:      Cervical: No cervical adenopathy.   Skin:     General: Skin is warm and dry.      Capillary Refill: Capillary refill takes less than 2 seconds.      Coloration: Skin is not jaundiced.      Findings: No bruising, erythema, lesion or rash.   Neurological:      Mental Status: He is alert and oriented to person, place, and time. Mental status is at baseline.      Cranial Nerves: No cranial nerve deficit.      Sensory: No sensory deficit.      Motor: No weakness.      Coordination: Coordination normal.      Gait: Gait normal.   Psychiatric:         Mood and Affect: Mood normal.         Behavior: Behavior normal.         Thought  Content: Thought content normal.         Judgment: Judgment normal.

## 2024-06-13 LAB
BASOPHILS # BLD AUTO: 42 CELLS/UL (ref 0–200)
BASOPHILS NFR BLD AUTO: 0.8 %
CHOLEST SERPL-MCNC: 240 MG/DL
CHOLEST/HDLC SERPL: 4.3 (CALC)
EOSINOPHIL # BLD AUTO: 182 CELLS/UL (ref 15–500)
EOSINOPHIL NFR BLD AUTO: 3.5 %
ERYTHROCYTE [DISTWIDTH] IN BLOOD BY AUTOMATED COUNT: 13.1 % (ref 11–15)
HCT VFR BLD AUTO: 49.1 % (ref 38.5–50)
HDLC SERPL-MCNC: 56 MG/DL
HGB BLD-MCNC: 16.6 G/DL (ref 13.2–17.1)
LDLC SERPL CALC-MCNC: 136 MG/DL (CALC)
LYMPHOCYTES # BLD AUTO: 1794 CELLS/UL (ref 850–3900)
LYMPHOCYTES NFR BLD AUTO: 34.5 %
MCH RBC QN AUTO: 31.8 PG (ref 27–33)
MCHC RBC AUTO-ENTMCNC: 33.8 G/DL (ref 32–36)
MCV RBC AUTO: 94.1 FL (ref 80–100)
MONOCYTES # BLD AUTO: 421 CELLS/UL (ref 200–950)
MONOCYTES NFR BLD AUTO: 8.1 %
NEUTROPHILS # BLD AUTO: 2761 CELLS/UL (ref 1500–7800)
NEUTROPHILS NFR BLD AUTO: 53.1 %
NONHDLC SERPL-MCNC: 184 MG/DL (CALC)
PLATELET # BLD AUTO: 171 THOUSAND/UL (ref 140–400)
PMV BLD REES-ECKER: 11.6 FL (ref 7.5–12.5)
RBC # BLD AUTO: 5.22 MILLION/UL (ref 4.2–5.8)
TRIGL SERPL-MCNC: 333 MG/DL
WBC # BLD AUTO: 5.2 THOUSAND/UL (ref 3.8–10.8)

## 2024-06-20 ENCOUNTER — OFFICE VISIT (OUTPATIENT)
Dept: FAMILY MEDICINE CLINIC | Facility: CLINIC | Age: 59
End: 2024-06-20
Payer: COMMERCIAL

## 2024-06-20 VITALS
OXYGEN SATURATION: 98 % | HEIGHT: 70 IN | BODY MASS INDEX: 33.21 KG/M2 | DIASTOLIC BLOOD PRESSURE: 80 MMHG | SYSTOLIC BLOOD PRESSURE: 132 MMHG | WEIGHT: 232 LBS | TEMPERATURE: 97.6 F | HEART RATE: 61 BPM

## 2024-06-20 DIAGNOSIS — Z23 ENCOUNTER FOR IMMUNIZATION: ICD-10-CM

## 2024-06-20 DIAGNOSIS — Z00.00 WELL ADULT EXAM: Primary | ICD-10-CM

## 2024-06-20 DIAGNOSIS — E78.2 MIXED HYPERLIPIDEMIA: ICD-10-CM

## 2024-06-20 DIAGNOSIS — Z12.5 PROSTATE CANCER SCREENING: ICD-10-CM

## 2024-06-20 PROCEDURE — 90471 IMMUNIZATION ADMIN: CPT

## 2024-06-20 PROCEDURE — 99396 PREV VISIT EST AGE 40-64: CPT | Performed by: FAMILY MEDICINE

## 2024-06-20 PROCEDURE — 90715 TDAP VACCINE 7 YRS/> IM: CPT

## 2024-06-20 NOTE — PROGRESS NOTES
"Assessment/Plan: Wellness exam done at this time.  Vaccines reviewed.  Patient will have Adacel shot at this time.  To consider Shingrix in the future.  Labs reviewed.  Patient will continue to monitor regarding hyperlipidemia.  Colonoscopy done 2019.  Patient further colonoscopy done in September.  Patient PSA for prostate cancer screening.  Patient modify diet appropriately.  Recheck lipid profile in the future.       Diagnoses and all orders for this visit:    Well adult exam    Prostate cancer screening  -     PSA, Total Screen; Future  -     PSA, Total Screen    Mixed hyperlipidemia  -     Lipid panel; Future  -     Lipid panel            Subjective:        Patient ID: Dario Tolliver is a 59 y.o. male.      Patient is here for wellness exam.  Labs vaccines reviewed.  Patient is able colonoscopy.  Patient due for prostate cancer screening.          The following portions of the patient's history were reviewed and updated as appropriate: allergies, current medications, past family history, past medical history, past social history, past surgical history and problem list.      Review of Systems   Constitutional: Negative.    HENT: Negative.     Eyes: Negative.    Respiratory: Negative.     Cardiovascular: Negative.    Gastrointestinal: Negative.    Endocrine: Negative.    Genitourinary: Negative.    Musculoskeletal: Negative.    Skin: Negative.    Allergic/Immunologic: Negative.    Neurological: Negative.    Hematological: Negative.    Psychiatric/Behavioral: Negative.             Objective:        Depression Screening and Follow-up Plan: Patient was screened for depression during today's encounter. They screened negative with a PHQ-2 score of 0.            /80 (BP Location: Left arm, Patient Position: Sitting, Cuff Size: Large)   Pulse 61   Temp 97.6 °F (36.4 °C) (Temporal)   Ht 5' 10\" (1.778 m)   Wt 105 kg (232 lb)   SpO2 98%   BMI 33.29 kg/m²          Physical Exam  Vitals and nursing note reviewed. "   Constitutional:       General: He is not in acute distress.     Appearance: Normal appearance. He is not ill-appearing, toxic-appearing or diaphoretic.   HENT:      Head: Normocephalic and atraumatic.      Right Ear: Tympanic membrane, ear canal and external ear normal. There is no impacted cerumen.      Left Ear: Tympanic membrane, ear canal and external ear normal. There is no impacted cerumen.      Nose: Nose normal. No congestion or rhinorrhea.      Mouth/Throat:      Mouth: Mucous membranes are moist.      Pharynx: No oropharyngeal exudate or posterior oropharyngeal erythema.   Eyes:      General: No scleral icterus.        Right eye: No discharge.         Left eye: No discharge.   Neck:      Vascular: No carotid bruit.   Cardiovascular:      Rate and Rhythm: Normal rate and regular rhythm.      Pulses: Normal pulses.      Heart sounds: Normal heart sounds. No murmur heard.     No friction rub. No gallop.   Pulmonary:      Effort: Pulmonary effort is normal. No respiratory distress.      Breath sounds: Normal breath sounds. No stridor. No wheezing, rhonchi or rales.   Chest:      Chest wall: No tenderness.   Musculoskeletal:         General: No swelling, tenderness, deformity or signs of injury. Normal range of motion.      Cervical back: Normal range of motion and neck supple. No rigidity. No muscular tenderness.      Right lower leg: No edema.      Left lower leg: No edema.   Lymphadenopathy:      Cervical: No cervical adenopathy.   Skin:     General: Skin is warm and dry.      Capillary Refill: Capillary refill takes less than 2 seconds.      Coloration: Skin is not jaundiced.      Findings: No bruising, erythema, lesion or rash.   Neurological:      Mental Status: He is alert and oriented to person, place, and time. Mental status is at baseline.      Cranial Nerves: No cranial nerve deficit.      Sensory: No sensory deficit.      Motor: No weakness.      Coordination: Coordination normal.      Gait: Gait  normal.   Psychiatric:         Mood and Affect: Mood normal.         Behavior: Behavior normal.         Thought Content: Thought content normal.         Judgment: Judgment normal.

## 2024-07-20 PROBLEM — Z00.00 WELL ADULT EXAM: Status: RESOLVED | Noted: 2024-06-20 | Resolved: 2024-07-20

## 2024-10-17 LAB
CHOLEST SERPL-MCNC: 239 MG/DL
CHOLEST/HDLC SERPL: 5.2 (CALC)
HDLC SERPL-MCNC: 46 MG/DL
NONHDLC SERPL-MCNC: 193 MG/DL (CALC)
PSA SERPL-MCNC: 0.63 NG/ML
TRIGL SERPL-MCNC: 534 MG/DL

## 2024-10-22 ENCOUNTER — OFFICE VISIT (OUTPATIENT)
Age: 59
End: 2024-10-22
Payer: COMMERCIAL

## 2024-10-22 VITALS
BODY MASS INDEX: 31.92 KG/M2 | HEART RATE: 63 BPM | DIASTOLIC BLOOD PRESSURE: 68 MMHG | WEIGHT: 223 LBS | OXYGEN SATURATION: 98 % | SYSTOLIC BLOOD PRESSURE: 128 MMHG | HEIGHT: 70 IN | TEMPERATURE: 97.8 F

## 2024-10-22 DIAGNOSIS — I25.10 CORONARY ARTERY DISEASE INVOLVING NATIVE CORONARY ARTERY OF NATIVE HEART WITHOUT ANGINA PECTORIS: ICD-10-CM

## 2024-10-22 DIAGNOSIS — B35.1 ONYCHOMYCOSIS: ICD-10-CM

## 2024-10-22 DIAGNOSIS — I10 ESSENTIAL HYPERTENSION: Primary | ICD-10-CM

## 2024-10-22 DIAGNOSIS — E78.2 MIXED HYPERLIPIDEMIA: ICD-10-CM

## 2024-10-22 DIAGNOSIS — K21.9 GASTROESOPHAGEAL REFLUX DISEASE, UNSPECIFIED WHETHER ESOPHAGITIS PRESENT: ICD-10-CM

## 2024-10-22 PROCEDURE — 99214 OFFICE O/P EST MOD 30 MIN: CPT | Performed by: FAMILY MEDICINE

## 2024-10-22 RX ORDER — CICLOPIROX 80 MG/ML
SOLUTION TOPICAL
Qty: 6 ML | Refills: 5 | Status: SHIPPED | OUTPATIENT
Start: 2024-10-22

## 2024-10-22 NOTE — PROGRESS NOTES
Assessment/Plan: PSA 0.6 CBC lipid profile reviewed at this time.  Vaccines reviewed.  To consider Shingrix in the future.  Patient will try to reduce alcohol and exercise more and modify diet accordingly regarding hyperlipidemic state.  Patient wishes to try this first before starting medication.  Continue to observe hypertension which is stable at this time.  GERD stable.  4 months       Diagnoses and all orders for this visit:    Essential hypertension  -     Lipid panel; Future  -     Comprehensive metabolic panel; Future  -     Hemoglobin A1C; Future  -     TSH, 3rd generation with Free T4 reflex; Future  -     CBC and differential; Future  -     Lipid panel  -     Comprehensive metabolic panel  -     TSH, 3rd generation with Free T4 reflex  -     CBC and differential    Coronary artery disease involving native coronary artery of native heart without angina pectoris  -     Lipid panel; Future  -     Comprehensive metabolic panel; Future  -     Hemoglobin A1C; Future  -     TSH, 3rd generation with Free T4 reflex; Future  -     CBC and differential; Future  -     Lipid panel  -     Comprehensive metabolic panel  -     TSH, 3rd generation with Free T4 reflex  -     CBC and differential    Gastroesophageal reflux disease, unspecified whether esophagitis present    Mixed hyperlipidemia  -     Lipid panel; Future  -     Comprehensive metabolic panel; Future  -     Hemoglobin A1C; Future  -     TSH, 3rd generation with Free T4 reflex; Future  -     CBC and differential; Future  -     Lipid panel  -     Comprehensive metabolic panel  -     TSH, 3rd generation with Free T4 reflex  -     CBC and differential            Subjective:        Patient ID: Dario Tolliver is a 59 y.o. male.      Patient is here to follow-up on GERD, hypertension hyperlipidemia with history of CAD.  Patient in relatively normal state of health overall.  No new significant chest pain or shortness of breath or difficulty urinating or defecating.   "Some reflux symptoms noted.  Patient does take Nexium every other day.  Patient did have labs done.          The following portions of the patient's history were reviewed and updated as appropriate: allergies, current medications, past family history, past medical history, past social history, past surgical history and problem list.      Review of Systems   Constitutional: Negative.  Negative for chills and fever.   HENT: Negative.  Negative for ear pain and sore throat.    Eyes: Negative.  Negative for pain and visual disturbance.   Respiratory: Negative.  Negative for cough and shortness of breath.    Cardiovascular: Negative.  Negative for chest pain and palpitations.   Gastrointestinal: Negative.  Negative for abdominal pain and vomiting.   Endocrine: Negative.    Genitourinary: Negative.  Negative for dysuria and hematuria.   Musculoskeletal: Negative.  Negative for arthralgias and back pain.   Skin: Negative.  Negative for color change and rash.   Allergic/Immunologic: Negative.    Neurological: Negative.  Negative for seizures and syncope.   Hematological: Negative.    Psychiatric/Behavioral: Negative.     All other systems reviewed and are negative.          Objective:               /68 (BP Location: Right arm, Patient Position: Sitting, Cuff Size: Standard)   Pulse 63   Temp 97.8 °F (36.6 °C) (Temporal)   Ht 5' 10\" (1.778 m)   Wt 101 kg (223 lb)   SpO2 98%   BMI 32.00 kg/m²          Physical Exam  Vitals and nursing note reviewed.   Constitutional:       General: He is not in acute distress.     Appearance: Normal appearance. He is not ill-appearing, toxic-appearing or diaphoretic.   HENT:      Head: Normocephalic and atraumatic.      Right Ear: Tympanic membrane, ear canal and external ear normal. There is no impacted cerumen.      Left Ear: Tympanic membrane, ear canal and external ear normal. There is no impacted cerumen.      Nose: Nose normal. No congestion or rhinorrhea.      Mouth/Throat: "      Mouth: Mucous membranes are moist.      Pharynx: No oropharyngeal exudate or posterior oropharyngeal erythema.   Eyes:      General: No scleral icterus.        Right eye: No discharge.         Left eye: No discharge.   Neck:      Vascular: No carotid bruit.   Cardiovascular:      Rate and Rhythm: Normal rate and regular rhythm.      Pulses: Normal pulses.      Heart sounds: Normal heart sounds. No murmur heard.     No friction rub. No gallop.   Pulmonary:      Effort: Pulmonary effort is normal. No respiratory distress.      Breath sounds: Normal breath sounds. No stridor. No wheezing, rhonchi or rales.   Chest:      Chest wall: No tenderness.   Musculoskeletal:         General: No swelling, tenderness, deformity or signs of injury. Normal range of motion.      Cervical back: Normal range of motion and neck supple. No rigidity. No muscular tenderness.      Right lower leg: No edema.      Left lower leg: No edema.   Lymphadenopathy:      Cervical: No cervical adenopathy.   Skin:     General: Skin is warm and dry.      Capillary Refill: Capillary refill takes less than 2 seconds.      Coloration: Skin is not jaundiced.      Findings: No bruising, erythema, lesion or rash.   Neurological:      Mental Status: He is alert and oriented to person, place, and time. Mental status is at baseline.      Cranial Nerves: No cranial nerve deficit.      Sensory: No sensory deficit.      Motor: No weakness.      Coordination: Coordination normal.      Gait: Gait normal.   Psychiatric:         Mood and Affect: Mood normal.         Behavior: Behavior normal.         Thought Content: Thought content normal.         Judgment: Judgment normal.

## 2024-10-23 DIAGNOSIS — M54.2 CERVICAL PAIN (NECK): ICD-10-CM

## 2024-10-23 DIAGNOSIS — M54.9 CHRONIC BACK PAIN, UNSPECIFIED BACK LOCATION, UNSPECIFIED BACK PAIN LATERALITY: Primary | ICD-10-CM

## 2024-10-23 DIAGNOSIS — G89.29 CHRONIC BACK PAIN, UNSPECIFIED BACK LOCATION, UNSPECIFIED BACK PAIN LATERALITY: Primary | ICD-10-CM

## 2025-01-14 ENCOUNTER — EVALUATION (OUTPATIENT)
Age: 60
End: 2025-01-14
Payer: COMMERCIAL

## 2025-01-14 DIAGNOSIS — M54.9 CHRONIC BACK PAIN, UNSPECIFIED BACK LOCATION, UNSPECIFIED BACK PAIN LATERALITY: Primary | ICD-10-CM

## 2025-01-14 DIAGNOSIS — G89.29 CHRONIC BACK PAIN, UNSPECIFIED BACK LOCATION, UNSPECIFIED BACK PAIN LATERALITY: Primary | ICD-10-CM

## 2025-01-14 DIAGNOSIS — M54.2 CERVICAL PAIN (NECK): ICD-10-CM

## 2025-01-14 PROCEDURE — 97161 PT EVAL LOW COMPLEX 20 MIN: CPT

## 2025-01-14 PROCEDURE — 97140 MANUAL THERAPY 1/> REGIONS: CPT

## 2025-01-14 PROCEDURE — 97112 NEUROMUSCULAR REEDUCATION: CPT

## 2025-01-14 NOTE — PROGRESS NOTES
PT Evaluation     Today's date: 2025  Patient name: Dairo Tolliver  : 1965  MRN: [id number]  Referring provider: Dani Kaiser DO  Dx:   Encounter Diagnosis     ICD-10-CM    1. Chronic back pain, unspecified back location, unspecified back pain laterality  M54.9 Ambulatory Referral to Physical Therapy    G89.29       2. Cervical pain (neck)  M54.2 Ambulatory Referral to Physical Therapy          Start Time: 1730  Stop Time: 183  Total time in clinic (min): 60 minutes    Assessment  Impairments: abnormal coordination, abnormal gait, abnormal muscle tone, abnormal or restricted ROM, activity intolerance, lacks appropriate home exercise program, pain with function, poor posture , poor body mechanics, unable to perform ADL, participation limitations, activity limitations and endurance  Symptom irritability: moderate    Assessment details: Pt presents with s/s consistent with chronic low back pain.  Pt impairments include: hypomobility of l/s and L SIJ, decreased AROM of l/s and hip, pain at rest, difficulty sleeping , dysfunctional gait and decreased participation in recreational activities.  HEP demonstrated and reviewed with Pt.  Pt would benefit from skilled PT services in order to meet goals and return to PLOF.  Barriers to therapy: none  Understanding of Dx/Px/POC: good     Prognosis: good    Goals  Pt will adhere to HEP and demonstrate proper form per treating PT discretion in 2 weeks.    Pt will reduce resting pain levels by 2 points on the VAS in 2 weeks.     Pt will improve AROM by 5-10 degrees of affected of both l/s and L hip in 2 weeks.    Pt will improve AROM WNL of affected of both l/s and L hip in 8 weeks.    Pt will be able to golf without pain in 8 weeks.    Pt will be able to sleep throughout the night in 12 weeks.          Plan  Patient would benefit from: skilled physical therapy  Referral necessary: No  Planned modality interventions: low level laser therapy, TENS, cryotherapy and  "thermotherapy: hydrocollator packs  Other planned modality interventions: epat    Planned therapy interventions: abdominal trunk stabilization, IASTM, joint mobilization, activity modification, ADL retraining, ADL training, kinesiology taping, manual therapy, massage, motor coordination training, body mechanics training, neuromuscular re-education, nerve gliding, breathing training, patient/caregiver education, coordination, postural training, fine motor coordination training, strengthening, stretching, flexibility, functional ROM exercises, gait training, therapeutic activities, therapeutic exercise, therapeutic training and home exercise program    Frequency: 2x week  Duration in weeks: 12  Plan of Care beginning date: 2025  Plan of Care expiration date: 2025  Treatment plan discussed with: patient  Plan details: Pt will participate in skilled PT services 2x/week tapering as needed for 8 weeks in order to return to PLOF and meet goals.        Subjective Evaluation    History of Present Illness  Mechanism of injury: Pt comes in with chief complaint of low back pain with disc herniations over ten years ago. Pt saw chiropractor for years and then stopped to pursue for physical therapy and reported \"very happy with treatment.\" Pt reports general discomfort and around 25 when a pain in L low back. Pt reports difficulty sleeping. Pt works from home with prolonged sitting but has obtained ergonomic assistance. Pt utilizes proper footwear and orthotics in both shoes. Pt has history of bone spurs on ankles b/l. 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           Recurrent probem    Quality of life: good    Patient Goals  Patient goals for therapy: increased strength, independence with ADLs/IADLs, return to sport/leisure activities and decreased pain  Patient goal: Pt wants to be able to reduce symptoms and be able to golf again.  Pain  Current pain ratin  At best pain rating: " 4  At worst pain ratin  Location: L Low back  Quality: dull ache  Relieving factors: change in position (sitting)  Aggravating factors: sitting  Progression: improved    Social Support  Stairs in house: yes   12  Lives in: multiple-level home  Lives with: spouse    Employment status: working ([employer])    Diagnostic Tests  X-ray: abnormal (c/s)  MRI studies: abnormal (c/s)  Treatments  Previous treatment: chiropractic and physical therapy        Objective     Concurrent Complaints  Positive for night pain and disturbed sleep. Negative for bladder dysfunction and bowel dysfunction    Postural Observations  Seated posture: poor  Standing posture: poor      Palpation   Left   Hypertonic in the lumbar paraspinals and quadratus lumborum.     Right   Hypertonic in the lumbar paraspinals and quadratus lumborum.     Neurological Testing     Sensation     Lumbar   Left   Intact: light touch    Right   Intact: light touch    Active Range of Motion     Lumbar   Flexion:  Restriction level: moderate  Extension:  with pain Restriction level: maximal  Left lateral flexion:  with pain Restriction level: maximal  Right lateral flexion:  with pain Restriction level: maximal  Left rotation:  Restriction level: minimal  Right rotation:  Restriction level: minimal    Joint Play     Hypomobile: T12, L1, L2, L3, L4 and L5   Mechanical Assessment    Cervical      Thoracic      Lumbar    Lying flexion: repeated movements  Pain location: centralized  Pain intensity: better  Pain level: decreased    Strength/Myotome Testing     Lumbar   Left   Normal strength    Right   Normal strength    Tests     Lumbar     Left   Positive passive SLR.     Right   Positive passive SLR.     Left Hip   Positive GRACIE and FADIR.     Ambulation     Observational Gait   Gait: antalgic     Quality of Movement During Gait     Hip    Hip (Left): Positive hike.     Additional Quality of Movement During Gait Details  R hip ER  R elevated shoulder      General Comments:      Lumbar Comments  No leg length discrepancy issues.              Precautions: none   POC expires Unit limit Auth Expiration date PT/OT/ST + Visit Limit?   4/14/25 BOMN no BOMN                             Visit/Unit Tracking  AUTH Status:  Date 1/14              no Used                Remaining                        Manuals             SI distract             Hip PROM             Lat distract Hip             P-A mobs L/s             Neuro Re-Ed             DKTC supine             Figure 4 stretch with inf glide             Hip CARS             QL stretch             MF rot/press             HS stretch             Supine ppt             Ther Ex                                                                                                                     Ther Activity             Guitar shoulder 12#                          Gait Training                                       Modalities

## 2025-01-20 ENCOUNTER — APPOINTMENT (OUTPATIENT)
Age: 60
End: 2025-01-20
Payer: COMMERCIAL

## 2025-01-23 ENCOUNTER — OFFICE VISIT (OUTPATIENT)
Age: 60
End: 2025-01-23
Payer: COMMERCIAL

## 2025-01-23 DIAGNOSIS — M54.9 CHRONIC BACK PAIN, UNSPECIFIED BACK LOCATION, UNSPECIFIED BACK PAIN LATERALITY: Primary | ICD-10-CM

## 2025-01-23 DIAGNOSIS — M54.2 CERVICAL PAIN (NECK): ICD-10-CM

## 2025-01-23 DIAGNOSIS — G89.29 CHRONIC BACK PAIN, UNSPECIFIED BACK LOCATION, UNSPECIFIED BACK PAIN LATERALITY: Primary | ICD-10-CM

## 2025-01-23 PROCEDURE — 97112 NEUROMUSCULAR REEDUCATION: CPT | Performed by: PHYSICAL THERAPIST

## 2025-01-23 PROCEDURE — 97110 THERAPEUTIC EXERCISES: CPT | Performed by: PHYSICAL THERAPIST

## 2025-01-23 NOTE — PROGRESS NOTES
"Daily Note     Today's date: 2025  Patient name: Dario Tolliver  : 1965  MRN: [id number]  Referring provider: Dani Kaiser DO  Dx:   Encounter Diagnosis     ICD-10-CM    1. Chronic back pain, unspecified back location, unspecified back pain laterality  M54.9     G89.29       2. Cervical pain (neck)  M54.2                      Subjective: Patient states he feels that HEP activities have been helping somewhat.       Objective: See treatment diary below      Assessment: Patient demonstrated moderate restriction of bilateral Sciatic nerve mobility, added Sciatic nerve glides to address restriction; no pain at completion of treatment session.       Plan: Continue per plan of care.      Precautions: none   POC expires Unit limit Auth Expiration date PT/OT/ST + Visit Limit?   25 BOMN no BOMN                             Visit/Unit Tracking  AUTH Status:  Date               no Used                Remaining                        Manuals             SI distract             Hip PROM             Lat distract Hip             P-A mobs L/s             Neuro Re-Ed             DKTC supine             Figure 4 stretch with inf glide             Hip CARS             QL stretch             MF rot/press [place] press 12# 20x3\" ea.            HS stretch Stand heel on stool 3x30\" ea.            Supine ppt 20x5\"            Sitting Sciatic nerve sliders 2x10 ea.            Ther Ex             Upright bike 6'            Allen rows  18# 3x10            Brock lat pull downs 14# 3x10                                                                             Ther Activity             Guitar shoulder 12#                          Gait Training                                       Modalities                                            "

## 2025-01-27 ENCOUNTER — OFFICE VISIT (OUTPATIENT)
Age: 60
End: 2025-01-27
Payer: COMMERCIAL

## 2025-01-27 DIAGNOSIS — G89.29 CHRONIC BACK PAIN, UNSPECIFIED BACK LOCATION, UNSPECIFIED BACK PAIN LATERALITY: Primary | ICD-10-CM

## 2025-01-27 DIAGNOSIS — M54.2 CERVICAL PAIN (NECK): ICD-10-CM

## 2025-01-27 DIAGNOSIS — M54.9 CHRONIC BACK PAIN, UNSPECIFIED BACK LOCATION, UNSPECIFIED BACK PAIN LATERALITY: Primary | ICD-10-CM

## 2025-01-27 PROCEDURE — 97110 THERAPEUTIC EXERCISES: CPT | Performed by: PHYSICAL THERAPIST

## 2025-01-27 PROCEDURE — 97112 NEUROMUSCULAR REEDUCATION: CPT | Performed by: PHYSICAL THERAPIST

## 2025-01-27 PROCEDURE — 97140 MANUAL THERAPY 1/> REGIONS: CPT | Performed by: PHYSICAL THERAPIST

## 2025-01-30 ENCOUNTER — OFFICE VISIT (OUTPATIENT)
Age: 60
End: 2025-01-30
Payer: COMMERCIAL

## 2025-01-30 DIAGNOSIS — G89.29 CHRONIC BACK PAIN, UNSPECIFIED BACK LOCATION, UNSPECIFIED BACK PAIN LATERALITY: Primary | ICD-10-CM

## 2025-01-30 DIAGNOSIS — M54.9 CHRONIC BACK PAIN, UNSPECIFIED BACK LOCATION, UNSPECIFIED BACK PAIN LATERALITY: Primary | ICD-10-CM

## 2025-01-30 DIAGNOSIS — M54.2 CERVICAL PAIN (NECK): ICD-10-CM

## 2025-01-30 PROCEDURE — 97110 THERAPEUTIC EXERCISES: CPT

## 2025-01-30 PROCEDURE — 97112 NEUROMUSCULAR REEDUCATION: CPT

## 2025-01-30 NOTE — PROGRESS NOTES
"Daily Note     Today's date: 2025  Patient name: Dario Tolliver  : 1965  MRN: [id number]  Referring provider: Dani Kaiser DO  Dx:   Encounter Diagnosis     ICD-10-CM    1. Chronic back pain, unspecified back location, unspecified back pain laterality  M54.9     G89.29       2. Cervical pain (neck)  M54.2             Subjective: Patient noted that he had soreness in back post treatment lasting into the night and next day but then noted his back felt better with decreased tightness. Patient noted stiffness in cervical region.       Objective: See treatment diary below      Assessment: Tolerated treatment fair. Patient exhibited good technique with therapeutic exercises and would benefit from continued PT. Patient responded well to manual treatment performed by DPT KENAN. Perform FOTO  NV.        Plan: Continue per plan of care.      Precautions: none   POC expires Unit limit Auth Expiration date PT/OT/ST + Visit Limit?   25 BOMN no BOMN                             Visit/Unit Tracking  AUTH Status:  Date            no Used                Remaining                  Daily Treatment Log  Manuals           SI distract             Hip PROM             Lat distract Hip             P-A mobs Lumbar and Thoracic Spine  GRC GRC          Foam Roller   GRC          FMP lumbar flexion   GRC          Neuro Re-Ed            DKTC supine  10x10\" 10'' x 10          Figure 4 stretch with inf glide             Hip CARS             QL stretch             MF rot/press [place] press 12# 20x3\" ea.            HS stretch Stand heel on stool 3x30\" ea.            Supine ppt 20x5\"  20 x 5\"              Sitting Sciatic nerve sliders 2x10 ea.            Hip Add Iso  20x5\"  20 x 5\"           Bridges  20x5\"  20 x 5\"           Open books   10'' x 10           Ther Ex            Upright bike 6'            Brock rows  18# 3x10            Brock lat pull downs 14# 3x10            Prayer " "Stretch 3 Way  10x10\" 10 X 10\"          T/S Ext   15x  15 x           Seated rotation stretch   10 x 5\" ea                                    Ther Activity             Guitar shoulder 12#                          Gait Training                                       Modalities  1/27 1/30          [facility]   10'  10' pre                              "

## 2025-01-30 NOTE — PROGRESS NOTES
"Daily Note   Today's date: 2025  Patient name: Dario Tolliver  : 1965  MRN: [id number]  Referring provider: Dani Kaiser DO  Dx:   Encounter Diagnosis     ICD-10-CM    1. Chronic back pain, unspecified back location, unspecified back pain laterality  M54.9     G89.29       2. Cervical pain (neck)  M54.2         Start Time: 1745  Stop Time: 1815  Total time in clinic (min): 30 minutes    Subjective: Patient reports feeling better at the end of today's session.     Objective: See treatment diary below    Assessment: PT added several new exercises.  PT plans to add more exercises in the near future.      Plan: Continue per plan of care.      Precautions: none   POC expires Unit limit Auth Expiration date PT/OT/ST + Visit Limit?   25 BOMN no BOMN                             Visit/Unit Tracking  AUTH Status:  Date             no Used                Remaining                  Daily Treatment Log  Manuals            SI distract             Hip PROM             Lat distract Hip             P-A mobs Lumbar and Thoracic Spine  [facility] [address]           Neuro Re-Ed             DKTC supine  10x10\"           Figure 4 stretch with inf glide             Hip CARS             QL stretch             MF rot/press [place] press 12# 20x3\" ea.            HS stretch Stand heel on stool 3x30\" ea.            Supine ppt 20x5\"            Sitting Sciatic nerve sliders 2x10 ea.            Hip Add Iso  20x5\"            Bridges  20x5\"                         Ther Ex             Upright bike 6'            [place] rows  18# 3x10            [place] lat pull downs 14# 3x10            Prayer Stretch 3 Way  10x10\"           T/S Ext   15x                                                   Ther Activity             Guitar shoulder 12#                          Gait Training                                       Modalities             MHP   10'                              "

## 2025-02-03 ENCOUNTER — APPOINTMENT (OUTPATIENT)
Age: 60
End: 2025-02-03
Payer: COMMERCIAL

## 2025-02-03 DIAGNOSIS — B35.1 ONYCHOMYCOSIS: ICD-10-CM

## 2025-02-03 RX ORDER — CICLOPIROX 80 MG/ML
SOLUTION TOPICAL
Qty: 6 ML | Refills: 0 | Status: SHIPPED | OUTPATIENT
Start: 2025-02-03

## 2025-02-06 ENCOUNTER — OFFICE VISIT (OUTPATIENT)
Age: 60
End: 2025-02-06
Payer: COMMERCIAL

## 2025-02-06 ENCOUNTER — APPOINTMENT (OUTPATIENT)
Age: 60
End: 2025-02-06
Payer: COMMERCIAL

## 2025-02-06 VITALS — BODY MASS INDEX: 31.92 KG/M2 | HEIGHT: 70 IN | WEIGHT: 223 LBS

## 2025-02-06 DIAGNOSIS — G89.29 CHRONIC BILATERAL LOW BACK PAIN WITHOUT SCIATICA: Primary | ICD-10-CM

## 2025-02-06 DIAGNOSIS — M54.9 CHRONIC BACK PAIN, UNSPECIFIED BACK LOCATION, UNSPECIFIED BACK PAIN LATERALITY: Primary | ICD-10-CM

## 2025-02-06 DIAGNOSIS — M54.50 CHRONIC BILATERAL LOW BACK PAIN WITHOUT SCIATICA: Primary | ICD-10-CM

## 2025-02-06 DIAGNOSIS — G89.29 CHRONIC BACK PAIN, UNSPECIFIED BACK LOCATION, UNSPECIFIED BACK PAIN LATERALITY: Primary | ICD-10-CM

## 2025-02-06 DIAGNOSIS — M47.814 THORACIC SPONDYLOSIS: ICD-10-CM

## 2025-02-06 DIAGNOSIS — G89.29 CHRONIC BILATERAL THORACIC BACK PAIN: ICD-10-CM

## 2025-02-06 DIAGNOSIS — M54.2 NECK PAIN: ICD-10-CM

## 2025-02-06 DIAGNOSIS — M54.6 CHRONIC BILATERAL THORACIC BACK PAIN: ICD-10-CM

## 2025-02-06 DIAGNOSIS — M54.2 CERVICAL PAIN (NECK): ICD-10-CM

## 2025-02-06 PROCEDURE — 99203 OFFICE O/P NEW LOW 30 MIN: CPT | Performed by: CHIROPRACTOR

## 2025-02-06 PROCEDURE — 72070 X-RAY EXAM THORAC SPINE 2VWS: CPT

## 2025-02-06 PROCEDURE — 72110 X-RAY EXAM L-2 SPINE 4/>VWS: CPT

## 2025-02-06 PROCEDURE — 97140 MANUAL THERAPY 1/> REGIONS: CPT | Performed by: PHYSICAL THERAPIST

## 2025-02-06 PROCEDURE — 97110 THERAPEUTIC EXERCISES: CPT

## 2025-02-06 NOTE — PROGRESS NOTES
"Daily Note     Today's date: 2025  Patient name: Dario Tolliver  : 1965  MRN: [id number]  Referring provider: Dani Kaiser DO  Dx:   Encounter Diagnosis     ICD-10-CM    1. Chronic back pain, unspecified back location, unspecified back pain laterality  M54.9     G89.29       2. Cervical pain (neck)  M54.2           Start Time: 1410  Stop Time: 1510  Total time in clinic (min): 60 minutes    Subjective: Presents to therapy noting stiffness in his back, stating he just had his initial appointment with the chiropractor. Reports the manual therapy on his back last visit loosened him up afterwards.       Objective: See treatment diary below      Assessment: Patient tolerated today's session well, focusing on mobility of thoracic and lumbar spine. Cupping introduced for increase in blood flow, both statically and with dynamic movement. Reviewed core engagement with glute strengthening to reduce stress on low back; patient subjectively reported muscular fatigue, but no low back aggravation. Upon leaving clinic patient displayed visibly improved lumbar flexion range of motion and would benefit from continued PT.      Plan: Continue per plan of care.      Precautions: none   POC expires Unit limit Auth Expiration date PT/OT/ST + Visit Limit?   25 BOMN no BOMN                             Visit/Unit Tracking  AUTH Status:  Date  2/          no Used                Remaining                  Daily Treatment Log  Manuals  2/6         SI distract             Hip PROM             Lat distract Hip             P-A mobs Lumbar and Thoracic Spine  GRC GRC GRC         Foam Roller   GRC GRC         IASTM    GRC         Cupping T-spine/L-spine    GRC  Static 2', IR/ER 2', quadruped rocks 2', thread the needle 2'         FMP lumbar flexion   GRC          Neuro Re-Ed   2/6         DKTC supine  10x10\" 10'' x 10          Figure 4 stretch with inf glide             Hip CARS          " "   QL stretch             MF rot/press [place] press 12# 20x3\" ea.            HS stretch Stand heel on stool 3x30\" ea.            Supine ppt 20x5\"  20 x 5\"              Sitting Sciatic nerve sliders 2x10 ea.            Hip Add Iso  20x5\"  20 x 5\"           Standing hip extension    2x10 B/L         Bridges  20x5\"  20 x 5\"  20 x 5''         Open books   10'' x 10           Ther Ex  1/27 1/30 2/6         Upright bike 6'            Brock rows  18# 3x10            [place] lat pull downs 14# 3x10            Prayer Stretch 3 Way  10x10\" 10 X 10\"          T/S Ext   15x  15 x           Seated rotation stretch   10 x 5\" ea                                    Ther Activity             Guitar shoulder 12#                          Gait Training                                       Modalities  1/27 1/30 2/6         MHP   10'  10' pre 10' pre                               "

## 2025-02-06 NOTE — PROGRESS NOTES
Assessment/Plan:       1. Chronic bilateral low back pain without sciatica  XR spine lumbar minimum 4 views non injury    CANCELED: XR spine lumbar minimum 4 views non injury      2. Chronic bilateral thoracic back pain  XR spine thoracic 2 vw    CANCELED: XR spine lumbar minimum 4 views non injury      3. Neck pain  CANCELED: XR spine lumbar minimum 4 views non injury      4. Thoracic spondylosis              Review of Diagnostic Studies and Office Notes:    The patient's cervical spine MRI, cervical spine x-rays and thoracic spine x-rays were reviewed prior to the chiropractic evaluation today.    Results for orders placed during the hospital encounter of 01/14/21    MRI cervical spine wo contrast    Addendum 1/25/2021  4:53 PM  ADDENDUM:    Study is reevaluated at the request of the ordering physician.    Scattered uncovertebral hypertrophy is noted at the following levels.    On the right at C3-C4 there is a disc osteophyte complex without focal disc herniation.  There is mild to moderate right bony neural foraminal narrowing, correlating to the radiographic findings.  Central canal and left neural foramen are patent.    C4-5 there is also a disc osteophyte complex with bilateral facet hypertrophy.  Mild to moderate right neural foraminal narrowing at this level noted as well.  Mild left neural foraminal narrowing.  Central canal widely patent.    C5-C6: There is a diffuse disk bulge.  Mild right-sided facet hypertrophy.  Mild right neural foraminal narrowing.  Central canal and left neural foramen patent.    Narrative  MRI CERVICAL SPINE WITHOUT CONTRAST    INDICATION: M54.12: Radiculopathy, cervical region  M48.02: Spinal stenosis, cervical region  M54.2: Cervicalgia.   Right side neck and arm pain since november    COMPARISON:  12/22/2020    TECHNIQUE:  Sagittal T1, sagittal T2, sagittal inversion recovery, axial T2, axial  2D merge    IMAGE QUALITY:  Motion artifact slightly limits the study.  Small  abnormalities could be obscured.  The study is sufficient to exclude cord compression which is not present.    FINDINGS:    ALIGNMENT:  Normal alignment of the cervical spine.  No compression fracture.  No subluxation.  No scoliosis.    MARROW SIGNAL:  Scattered degenerative endplate changes.  No focally suspicious marrow lesions.  No bone marrow edema or compression abnormality.    CERVICAL AND VISUALIZED THORACIC CORD:  Normal signal within the visualized cord.    PREVERTEBRAL AND PARASPINAL SOFT TISSUES:  Normal.    VISUALIZED POSTERIOR FOSSA:  The visualized posterior fossa demonstrates no abnormal signal.    CERVICAL DISC SPACES:    C2-C3:  Normal.    C3-C4:  Normal.    C4-C5:  Normal.    C5-C6:  Normal.    C6-C7:  Normal.    C7-T1:  Normal.    UPPER THORACIC DISC SPACES:  Normal.    Impression  There is no focal disk herniation, central canal or neural foraminal narrowing.      Workstation performed: [id number]      Results for orders placed in visit on 12/22/20    XR spine cervical complete 4 or 5 vw non injury    Narrative  CERVICAL SPINE    INDICATION:   M54.12: Radiculopathy, cervical region.    COMPARISON:  Cervical spine radiograph 4/8/2016    VIEWS:  XR SPINE CERVICAL COMPLETE 4 OR 5 VW NON INJURY      FINDINGS:    No fracture.    Normal alignment without subluxation.    The intervertebral disc spaces are preserved. Small multilevel marginal spurs, grossly stable. Multilevel facet and uncovertebral degenerative changes right greater than left mild interval progression of disease.    Progressive severe neuroforaminal narrowing on the right at C3-4 and to a lesser extent C4-5 and C5-6.    Stable mild neuroforaminal narrowing on the left at C4-5.    The prevertebral soft tissues are within normal limits.    The lung apices are clear.    Impression  No acute osseous abnormality.    Degenerative changes as above.      Workstation performed: [id number]      XR spine thoracic 3 vw    Narrative  THORACIC  SPINE    INDICATION:   M54.6: Pain in thoracic spine.    COMPARISON:  None    VIEWS:  XR SPINE THORACIC 3 VW      FINDINGS:    There is no fracture or pathologic bone lesion. Mild chronic appearing compression deformity of T12.    Thoracic vertebral alignment is within normal limits.    Bridging paraspinal ossifications at multiple levels out of proportion to disc space narrowing most suggestive of diffuse idiopathic skeletal hyperostosis (DISH).    There is no displacement of the paraspinal line.    The pedicles appear intact.    Impression  No acute osseous abnormality.    Multilevel thoracic spondylosis in a pattern suggestive of diffuse idiopathic skeletal hyperostosis.      Workstation performed: [id number]      Decision and Treatment Plan:    I reviewed my findings with the patient today.  Patient was interested in receiving chiropractic care and was treated today.  We will treat the patient 2x/week for the next three weeks and re-evaluate. If there is improvement in symptoms and objective findings, frequency will be reduced.  Patient is doing physical therapy but would like to do chiropractic care twice per week.  We offered 1-2 times per week and he would like to try twice per week to see if that treatment can make a difference in his condition.    The patient will be treated with conservative chiropractic care including myofascial release, joint mobilization, and a home stretching and icing program.    Patient Instructions:    X-rays were ordered of the patient's thoracic spine.  X-rays were ordered of the patient's lumbar spine.  Return for treatment twice next week.     TIME/Reviewed with Patient:    At least 33 minutes of time was spent with the patient during the consultation including the patient's history/current complaints, physical exam, reviewing the diagnostic report, reviewing home instruction/reducing risk factors with the patient, reviewing my findings with the patient, and discussing the  "treatment with the patient.     No treatment was rendered today.     Subjective:      Dario Tolliver is a 59 y.o. male presents today for chiropractic evaluation and possible treatment.  He came here on his own.  He has chief complaint of lower back pain with pain into the mid back.  He states he has had pain in those regions for \"decades\".  He has had chiropractic care done in the past which was helpful at least temporarily.  He has not had any other treatments to the lower back until just recently.  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  He works from home.  Just recently got a sit/stand desk.  He denies radiation of pain down the legs, numbness or tingling in lower extremities.    He also complains of tightness and stiffness in his neck with reduction in mobility.  Would like to see if we can help him with gaining some mobility there.  He denies radiation of pain down his arms, numbness or tingling in the upper extremities.  He has had chiropractic care for his neck in the past which has been helpful.  He states he was treated for a \"pinched nerve\" in his neck using physical therapy 4 to 5 years ago. Prior to that he received regular chiropractic care but not since, He did have imaging of his spine at that time.  He also did have an MRI of his cervical spine.  He did not need injections.    He denies fever, chills, night sweats, recent unexplained weight loss, changes in bowel/bladder habits, urinary incontinence or retention.       Objective:  Visit Vitals  Ht 5' 10\" (1.778 m)   Wt 101 kg (223 lb)   BMI 32.00 kg/m²   Smoking Status Former   BSA 2.18 m²        Appearance: Well developed, well nourished, and in no acute distress    Alert and oriented x 3    Mood/Affect: calm and pleasant    Gait/Posture:    Gait: Normal    Antalgic posture: None    Lordosis: Lumbar- normal    Kyphosis: Thoracic- " increased    Lower extremity reflexes:    Deep tendon reflexes were normal and symmetrical in the bilateral lower extremities.  Upper extremity reflexes are 2+ and equal bilaterally.    Babinski was negative bilaterally.    Lumbar Orthopedic Tests:    Supine Straight Leg Raise was negative  on the Right.    Supine Straight Leg Raise was negative  on the Left.    Ely's was negative bilaterally.    Active Lumbar Ranges of Motion:    Lumbar range of motion examination shows flexion to be 40% restricted with pain.  Extension is 40% restricted with pain.  Left lateral bending 60% restricted with pain.  Right lateral bending is 60% restricted with pain.  Rotation is 30% restricted bilaterally.    Cervical range of motion:  Cervical range of motion examination shows flexion to be full and pain-free.  Extension is 30% restricted with pain.  Left rotation is 30% restricted with pain.  Right rotation is 30% restricted with pain.    Palpation:    Moderate/severe spasm and tenderness is noted in the lumbar paraspinals, quadratus lumborum, gluteus medius, thoracic paraspinals.moderate spasm and tenderness is noted at the levator scapula, rhomboids, upper trapezius.    Decreased mobility and tenderness is noted at L5-S1, the right sacroiliac joint, left sacroiliac joint, T9-10, T5-6, C5-6.       Dictation voice to text software has been used in the creation of this document. Please consider this in light of any contextual or grammatical errors.

## 2025-02-11 ENCOUNTER — PROCEDURE VISIT (OUTPATIENT)
Age: 60
End: 2025-02-11
Payer: COMMERCIAL

## 2025-02-11 ENCOUNTER — APPOINTMENT (OUTPATIENT)
Age: 60
End: 2025-02-11
Payer: COMMERCIAL

## 2025-02-11 VITALS — HEIGHT: 70 IN | WEIGHT: 227 LBS | BODY MASS INDEX: 32.5 KG/M2

## 2025-02-11 DIAGNOSIS — M54.2 NECK PAIN: ICD-10-CM

## 2025-02-11 DIAGNOSIS — M54.6 CHRONIC BILATERAL THORACIC BACK PAIN: ICD-10-CM

## 2025-02-11 DIAGNOSIS — G89.29 CHRONIC BILATERAL LOW BACK PAIN WITHOUT SCIATICA: Primary | ICD-10-CM

## 2025-02-11 DIAGNOSIS — G89.29 CHRONIC BILATERAL THORACIC BACK PAIN: ICD-10-CM

## 2025-02-11 DIAGNOSIS — M47.816 LUMBAR SPONDYLOSIS: ICD-10-CM

## 2025-02-11 DIAGNOSIS — M47.814 THORACIC SPONDYLOSIS: ICD-10-CM

## 2025-02-11 DIAGNOSIS — M54.50 CHRONIC BILATERAL LOW BACK PAIN WITHOUT SCIATICA: Primary | ICD-10-CM

## 2025-02-11 PROCEDURE — 72040 X-RAY EXAM NECK SPINE 2-3 VW: CPT

## 2025-02-11 PROCEDURE — 98941 CHIROPRACT MANJ 3-4 REGIONS: CPT | Performed by: CHIROPRACTOR

## 2025-02-11 NOTE — PROGRESS NOTES
Assessment:     1. Chronic bilateral low back pain without sciatica        2. Chronic bilateral thoracic back pain        3. Neck pain  XR spine cervical 2 or 3 vw injury      4. Thoracic spondylosis        5. Lumbar spondylosis            Condition is the same.    PLAN/TREATMENT:  Referred for x-rays of the cervical spine.   Return for treatment later this week.     Continue using moist heat  Continue home exercises as per PT.     Treatment:  Myofascial release was performed to the thoracic paraspinals, lumbar paraspinals, quadratus lumborum, gluteus medius, levator scapulae, rhomboids, upper trapezius bilaterally.    Manipulation was performed to the left sacroiliac joint, L5-S1, L4-5 utilizing So drop technique and side-lying flexion distraction as well as side posture mobilization.  Ambulation was performed to the thoracic spine utilizing a posterior to anterior mobilization.  Stairstepping mobilization was performed to the cervical spine at C4-5, C5-6.    Subjective:  Dario is here today with ongoing complaints of neck and back pain.     Patient is feeling the same since last visit.    Objective:  Moderate/severe spasm and tenderness is noted in the lumbar paraspinals, quadratus lumborum, gluteus medius, thoracic paraspinals.moderate spasm and tenderness is noted at the levator scapula, rhomboids, upper trapezius.     Decreased mobility and tenderness is noted at L5-S1, the right sacroiliac joint, left sacroiliac joint, T9-10, T5-6, C5-6.    Imaging:    Study Result - 2/6/25    Narrative & Impression   XR SPINE THORACIC 2 VW     INDICATION: M54.6: Pain in thoracic spine  G89.29: Other chronic pain.     COMPARISON: 12/22/2020     FINDINGS:     No acute fracture. Intact pedicles.     Anatomic alignment.     Mild/moderate spondylosis.     No displacement of the paraspinal line.     IMPRESSION:     No acute osseous abnormality.        Reviewed x-ray images and report with patient.   L-spine images not read  yet.  Images show the presence of DISH.

## 2025-02-13 ENCOUNTER — PROCEDURE VISIT (OUTPATIENT)
Age: 60
End: 2025-02-13
Payer: COMMERCIAL

## 2025-02-13 ENCOUNTER — RESULTS FOLLOW-UP (OUTPATIENT)
Age: 60
End: 2025-02-13

## 2025-02-13 ENCOUNTER — OFFICE VISIT (OUTPATIENT)
Age: 60
End: 2025-02-13
Payer: COMMERCIAL

## 2025-02-13 VITALS — BODY MASS INDEX: 32.5 KG/M2 | HEIGHT: 70 IN | WEIGHT: 227 LBS

## 2025-02-13 DIAGNOSIS — G89.29 CHRONIC BACK PAIN, UNSPECIFIED BACK LOCATION, UNSPECIFIED BACK PAIN LATERALITY: Primary | ICD-10-CM

## 2025-02-13 DIAGNOSIS — M54.2 NECK PAIN: ICD-10-CM

## 2025-02-13 DIAGNOSIS — G89.29 CHRONIC BILATERAL LOW BACK PAIN WITHOUT SCIATICA: Primary | ICD-10-CM

## 2025-02-13 DIAGNOSIS — M47.816 SPONDYLOSIS OF LUMBAR SPINE: ICD-10-CM

## 2025-02-13 DIAGNOSIS — M54.50 CHRONIC BILATERAL LOW BACK PAIN WITHOUT SCIATICA: Primary | ICD-10-CM

## 2025-02-13 DIAGNOSIS — M54.9 CHRONIC BACK PAIN, UNSPECIFIED BACK LOCATION, UNSPECIFIED BACK PAIN LATERALITY: Primary | ICD-10-CM

## 2025-02-13 DIAGNOSIS — M47.812 FACET ARTHRITIS OF CERVICAL REGION: ICD-10-CM

## 2025-02-13 DIAGNOSIS — M54.6 CHRONIC BILATERAL THORACIC BACK PAIN: ICD-10-CM

## 2025-02-13 DIAGNOSIS — M54.2 CERVICAL PAIN (NECK): ICD-10-CM

## 2025-02-13 DIAGNOSIS — M47.814 THORACIC SPONDYLOSIS: ICD-10-CM

## 2025-02-13 DIAGNOSIS — G89.29 CHRONIC BILATERAL THORACIC BACK PAIN: ICD-10-CM

## 2025-02-13 PROCEDURE — 97110 THERAPEUTIC EXERCISES: CPT | Performed by: CHIROPRACTOR

## 2025-02-13 PROCEDURE — 97110 THERAPEUTIC EXERCISES: CPT | Performed by: PHYSICAL THERAPIST

## 2025-02-13 PROCEDURE — 97140 MANUAL THERAPY 1/> REGIONS: CPT | Performed by: PHYSICAL THERAPIST

## 2025-02-13 PROCEDURE — 98941 CHIROPRACT MANJ 3-4 REGIONS: CPT | Performed by: CHIROPRACTOR

## 2025-02-13 PROCEDURE — 97112 NEUROMUSCULAR REEDUCATION: CPT | Performed by: PHYSICAL THERAPIST

## 2025-02-13 NOTE — PROGRESS NOTES
Assessment:     1. Chronic bilateral low back pain without sciatica        2. Chronic bilateral thoracic back pain        3. Neck pain        4. Thoracic spondylosis        5. Spondylosis of lumbar spine        6. Facet arthritis of cervical region              Condition is the same.    PLAN/TREATMENT:    Return for treatment 1-2 times per week.     Continue using moist heat  Continue home exercises as per PT.     Treatment:  Myofascial release was performed to the thoracic paraspinals, lumbar paraspinals, quadratus lumborum, gluteus medius, levator scapulae, rhomboids, upper trapezius bilaterally. GT4 was used on the lumbar paraspinals, Q/L bilaterally as well as right Levator scapula, cervical paraspinals, rhomboids, upper trapezius.  This was performed with movement to the cervical spine to increase range of motion and flexibility.  Treatment time was 8 minutes.    Manipulation was performed to the left sacroiliac joint, L5-S1, L4-5 utilizing So drop technique and side-lying flexion distraction as well as side posture mobilization.  Ambulation was performed to the thoracic spine utilizing a posterior to anterior mobilization.  Stairstepping mobilization was performed to the cervical spine at C4-5, C5-6.  [id number], [id number]  Subjective:  Dario is here today with ongoing complaints of neck and back pain.     Patient is feeling the same since last visit.    Objective:  Moderate/severe spasm and tenderness is noted in the lumbar paraspinals, quadratus lumborum, gluteus medius, thoracic paraspinals.moderate spasm and tenderness is noted at the levator scapula, rhomboids, upper trapezius.     Decreased mobility and tenderness is noted at L5-S1, the right sacroiliac joint, left sacroiliac joint, T9-10, T5-6, C5-6.    Imaging:    Study Result - 2/6/25    Narrative & Impression   XR SPINE THORACIC 2 VW     INDICATION: M54.6: Pain in thoracic spine  G89.29: Other chronic pain.     COMPARISON: 12/22/2020     FINDINGS:      No acute fracture. Intact pedicles.     Anatomic alignment.     Mild/moderate spondylosis.     No displacement of the paraspinal line.     IMPRESSION:     No acute osseous abnormality.      Study Result    Narrative & Impression   XR SPINE LUMBAR MINIMUM 4 VIEWS NON INJURY     INDICATION: M54.50: Low back pain, unspecified  G89.29: Other chronic pain.     COMPARISON: None     FINDINGS:     No acute fracture. Intact pedicles.     Five non-rib-bearing lumbar vertebral bodies.     No evidence of instability during flexion/extension.     Multilevel degenerative disc disease with anterior osteophytes throughout the lumbar spine.     Atherosclerotic calcifications. Otherwise unremarkable soft tissues.     IMPRESSION:     No acute osseous abnormality.     Degenerative changes as described.        Computerized Assisted Algorithm (CAA) may have been used to analyze all applicable images.   Study Result    Narrative & Impression   XR SPINE CERVICAL 2 OR 3 VW INJURY     INDICATION: M54.2: Cervicalgia.     COMPARISON: 12/22/2020     FINDINGS:     No acute fracture or subluxation.     Anatomic alignment.     Endplate change with facet arthrosis throughout the cervical spine consistent with moderate degenerative change.     Normal prevertebral soft tissues.     Clear lung apices.     IMPRESSION:     No acute osseous abnormality.        Reviewed x-ray images and reports with patient.

## 2025-02-15 NOTE — PROGRESS NOTES
"Daily Note     Today's date: 2/15/2025  Patient name: Dario Tolliver  : 1965  MRN: [id number]  Referring provider: Dani Kaiser DO  Dx:   Encounter Diagnosis     ICD-10-CM    1. Chronic back pain, unspecified back location, unspecified back pain laterality  M54.9     G89.29       2. Cervical pain (neck)  M54.2                      Subjective: Presents to therapy noting stiffness in his back, stating he just had his initial appointment with the chiropractor. Reports the manual therapy on his back last visit loosened him up afterwards.       Objective: See treatment diary below      Assessment: Patient tolerated today's session well, focusing on mobility of thoracic and lumbar spine. Cupping introduced for increase in blood flow, both statically and with dynamic movement. Reviewed core engagement with glute strengthening to reduce stress on low back; patient subjectively reported muscular fatigue, but no low back aggravation. Upon leaving clinic patient displayed visibly improved lumbar flexion range of motion and would benefit from continued PT.      Plan: Continue per plan of care.      Precautions: none   POC expires Unit limit Auth Expiration date PT/OT/ST + Visit Limit?   25 BOMN no BOMN                             Visit/Unit Tracking  AUTH Status:  Date  2/          no Used                Remaining                  Daily Treatment Log  Manuals  2/6         SI distract             Hip PROM             Lat distract Hip             P-A mobs Lumbar and Thoracic Spine  GRC GRC GRC         Foam Roller   GRC GRC         IASTM    GRC         Cupping T-spine/L-spine    GRC  Static 2', IR/ER 2', quadruped rocks 2', thread the needle 2'         FMP lumbar flexion   GRC          Neuro Re-Ed   2/6         DKTC supine  10x10\" 10'' x 10          Figure 4 stretch with inf glide             Hip CARS             QL stretch             MF rot/press [place] press 12# 20x3\" " "ea.            HS stretch Stand heel on stool 3x30\" ea.            Supine ppt 20x5\"  20 x 5\"              Sitting Sciatic nerve sliders 2x10 ea.            Hip Add Iso  20x5\"  20 x 5\"           Standing hip extension    2x10 B/L         Bridges  20x5\"  20 x 5\"  20 x 5''         Open books   10'' x 10           Ther Ex  1/27 1/30 2/6         Upright bike 6'            [place] rows  18# 3x10            [place] lat pull downs 14# 3x10            Prayer Stretch 3 Way  10x10\" 10 X 10\"          T/S Ext   15x  15 x           Seated rotation stretch   10 x 5\" ea                                    Ther Activity             Guitar shoulder 12#                          Gait Training                                       Modalities  1/27 1/30 2/6         MHP   10'  10' pre 10' pre                               "

## 2025-02-17 ENCOUNTER — APPOINTMENT (OUTPATIENT)
Age: 60
End: 2025-02-17
Payer: COMMERCIAL

## 2025-02-20 ENCOUNTER — OFFICE VISIT (OUTPATIENT)
Age: 60
End: 2025-02-20
Payer: COMMERCIAL

## 2025-02-20 DIAGNOSIS — G89.29 CHRONIC BACK PAIN, UNSPECIFIED BACK LOCATION, UNSPECIFIED BACK PAIN LATERALITY: Primary | ICD-10-CM

## 2025-02-20 DIAGNOSIS — M54.9 CHRONIC BACK PAIN, UNSPECIFIED BACK LOCATION, UNSPECIFIED BACK PAIN LATERALITY: Primary | ICD-10-CM

## 2025-02-20 DIAGNOSIS — M54.2 CERVICAL PAIN (NECK): ICD-10-CM

## 2025-02-20 PROCEDURE — 97112 NEUROMUSCULAR REEDUCATION: CPT | Performed by: PHYSICAL THERAPIST

## 2025-02-20 PROCEDURE — 97110 THERAPEUTIC EXERCISES: CPT | Performed by: PHYSICAL THERAPIST

## 2025-02-20 PROCEDURE — 97140 MANUAL THERAPY 1/> REGIONS: CPT | Performed by: PHYSICAL THERAPIST

## 2025-02-21 ENCOUNTER — TELEPHONE (OUTPATIENT)
Age: 60
End: 2025-02-21

## 2025-02-23 NOTE — PROGRESS NOTES
"Daily Note   Today's date: 2025  Patient name: Dario Tolliver  : 1965  MRN: [id number]  Referring provider: Dani Kaiser DO  Dx:   Encounter Diagnosis     ICD-10-CM    1. Chronic back pain, unspecified back location, unspecified back pain laterality  M54.9     G89.29       2. Cervical pain (neck)  M54.2                    Subjective: Patient said that he spoke with his DC (Dr MC in [facility]).  He was told he his back is very tight.  Patient was given a referral to pain management by Dr MC as well.     Objective: See treatment diary below    Assessment: Patient tolerated today's session well, focusing on mobility of thoracic and lumbar spine. Patient requested not to do cupping today.  PT tried laser.  Monitor response NV.      Plan: Continue per plan of care.      Precautions: none   POC expires Unit limit Auth Expiration date PT/OT/ST + Visit Limit?   25 BOMN no BOMN                             Visit/Unit Tracking  AUTH Status:  Date          no Used                Remaining                  Daily Treatment Log  Manuals         SI distract             Hip PROM             Lat distract Hip             P-A mobs Lumbar and Thoracic Spine  GRC GRC GRC         Foam Roller   GRC GRC         IASTM    GRC         Cupping T-spine/L-spine    GRC  Static 2', IR/ER 2', quadruped rocks 2', thread the needle 2'         FMP lumbar flexion   GRC          Laser     GRC         Neuro Re-Ed          DKTC supine  10x10\" 10'' x 10  10x10\"         Figure 4 stretch with inf glide             Hip CARS             QL stretch             MF rot/press [place] press 12# 20x3\" ea.    [place] Press   12#   30x ea        HS stretch Stand heel on stool 3x30\" ea.            Supine ppt 20x5\"  20 x 5\"      20 x 5\"         Sitting Sciatic nerve sliders 2x10 ea.            Hip Add Iso  20x5\"  20 x 5\"           Standing hip extension    2x10 B/L         Bridges  " "20x5\"  20 x 5\"  20 x 5'' 20 x 5\"         Open books   10'' x 10           Ther Ex  1/27 1/30 2/6 2/13        Upright bike 6'            [place] rows  18# 3x10            [place] lat pull downs 14# 3x10            Prayer Stretch 3 Way  10x10\" 10 X 10\"  10x10\" ea        T/S Ext   15x  15 x   15x         Seated rotation stretch   10 x 5\" ea                                    Ther Activity             Guitar shoulder 12#                          Gait Training                                       Modalities  1/27 1/30 2/6 2/13        MHP   10'  10' pre 10' pre 10'                               "

## 2025-02-24 ENCOUNTER — PROCEDURE VISIT (OUTPATIENT)
Age: 60
End: 2025-02-24
Payer: COMMERCIAL

## 2025-02-24 VITALS — WEIGHT: 227 LBS | BODY MASS INDEX: 32.5 KG/M2 | HEIGHT: 70 IN

## 2025-02-24 DIAGNOSIS — M54.50 CHRONIC BILATERAL LOW BACK PAIN WITHOUT SCIATICA: Primary | ICD-10-CM

## 2025-02-24 DIAGNOSIS — M54.2 NECK PAIN: ICD-10-CM

## 2025-02-24 DIAGNOSIS — M47.816 LUMBAR SPONDYLOSIS: ICD-10-CM

## 2025-02-24 DIAGNOSIS — M47.816 SPONDYLOSIS OF LUMBAR SPINE: ICD-10-CM

## 2025-02-24 DIAGNOSIS — G89.29 CHRONIC BILATERAL LOW BACK PAIN WITHOUT SCIATICA: Primary | ICD-10-CM

## 2025-02-24 DIAGNOSIS — M47.812 FACET ARTHRITIS OF CERVICAL REGION: ICD-10-CM

## 2025-02-24 DIAGNOSIS — M47.814 THORACIC SPONDYLOSIS: ICD-10-CM

## 2025-02-24 DIAGNOSIS — G89.29 CHRONIC BILATERAL THORACIC BACK PAIN: ICD-10-CM

## 2025-02-24 DIAGNOSIS — M54.6 CHRONIC BILATERAL THORACIC BACK PAIN: ICD-10-CM

## 2025-02-24 PROCEDURE — 97110 THERAPEUTIC EXERCISES: CPT | Performed by: CHIROPRACTOR

## 2025-02-24 PROCEDURE — 98941 CHIROPRACT MANJ 3-4 REGIONS: CPT | Performed by: CHIROPRACTOR

## 2025-02-24 NOTE — PROGRESS NOTES
Assessment:     1. Chronic bilateral low back pain without sciatica        2. Chronic bilateral thoracic back pain        3. Neck pain        4. Thoracic spondylosis        5. Spondylosis of lumbar spine        6. Facet arthritis of cervical region        7. Lumbar spondylosis              Condition is the same.    PLAN/TREATMENT:    Return for treatment once next week.     Continue using moist heat  Continue home exercises as per PT.     Treatment:  Myofascial release was performed to the thoracic paraspinals, lumbar paraspinals, quadratus lumborum, gluteus medius, levator scapulae, rhomboids, upper trapezius bilaterally. GT4 was used on the lumbar paraspinals, Q/L bilaterally as well as right Levator scapula, cervical paraspinals, rhomboids, upper trapezius.  This was performed with movement to the cervical spine to increase range of motion and flexibility.  Treatment time was 8 minutes.    Manipulation was performed to the left sacroiliac joint, L5-S1, L4-5 utilizing So drop technique and side-lying flexion distraction as well as side posture mobilization.  Ambulation was performed to the thoracic spine utilizing a posterior to anterior mobilization.  Stairstepping mobilization was performed to the cervical spine at C4-5, C5-6.  [id number]    Subjective:  Dario is here today with ongoing complaints of neck and back pain.   Patient is feeling the same since last visit.  Gets some temporary loosening.     Objective:  Moderate/severe spasm and tenderness is noted in the lumbar paraspinals, quadratus lumborum, gluteus medius, thoracic paraspinals.moderate spasm and tenderness is noted at the levator scapula, rhomboids, upper trapezius.     Decreased mobility and tenderness is noted at L5-S1, the right sacroiliac joint, left sacroiliac joint, T9-10, T5-6, C5-6.

## 2025-02-25 ENCOUNTER — OFFICE VISIT (OUTPATIENT)
Age: 60
End: 2025-02-25
Payer: COMMERCIAL

## 2025-02-25 DIAGNOSIS — M54.9 CHRONIC BACK PAIN, UNSPECIFIED BACK LOCATION, UNSPECIFIED BACK PAIN LATERALITY: Primary | ICD-10-CM

## 2025-02-25 DIAGNOSIS — G89.29 CHRONIC BACK PAIN, UNSPECIFIED BACK LOCATION, UNSPECIFIED BACK PAIN LATERALITY: Primary | ICD-10-CM

## 2025-02-25 DIAGNOSIS — M54.2 CERVICAL PAIN (NECK): ICD-10-CM

## 2025-02-25 PROCEDURE — 97140 MANUAL THERAPY 1/> REGIONS: CPT

## 2025-02-25 PROCEDURE — 97110 THERAPEUTIC EXERCISES: CPT

## 2025-02-25 PROCEDURE — 97112 NEUROMUSCULAR REEDUCATION: CPT

## 2025-02-25 NOTE — PROGRESS NOTES
"Daily Note     Today's date: 2025  Patient name: Dario Tolliver  : 1965  MRN: [id number]  Referring provider: Dani Kaiser DO  Dx:   Encounter Diagnosis     ICD-10-CM    1. Chronic back pain, unspecified back location, unspecified back pain laterality  M54.9     G89.29       2. Cervical pain (neck)  M54.2                      Subjective: Patient reports back feels stiff. Reports good relief post treatment.       Objective: See treatment diary below      Assessment: Dario Tolliver tolerated treatment well. Focused on spinal mobility to decrease arthritic stiffness. Increased excursion with decreased pain post treatment noted and expressed. Continued treatment indicated.       Plan: Continue per plan of care.      Precautions: none   POC expires Unit limit Auth Expiration date PT/OT/ST + Visit Limit?   25 BOMN no BOMN                             Visit/Unit Tracking  AUTH Status:  Date  2        no Used                Remaining                  Daily Treatment Log  Manuals    SI distract          Hip PROM          Lat distract Hip          P-A mobs Lumbar and Thoracic Spine  GRC GRC GRC  GRC GRC   Foam Roller   GRC GRC  GRC SJ   IASTM    GRC      Cupping T-spine/L-spine    GRC  Static 2', IR/ER 2', quadruped rocks 2', thread the needle 2'      FMP lumbar flexion   GRC       Laser     GRC  GRC NV   Neuro Re-Ed   2   DKTC supine  10x10\" 10'' x 10  10x10\"  10x10\"  10x10\"    LTR       10x10\"   Figure 4 stretch with inf glide          Hip CARS          QL stretch          MF rot/press [place] press 12# 20x3\" ea.    Brock Press   12#   30x ea Brock Press   12#   30x ea Brock Press   15#   30x ea   HS stretch Stand heel on stool 3x30\" ea.         Supine ppt 20x5\"  20 x 5\"      20 x 5\"  20x5\"  20x5\"    Sitting Sciatic nerve sliders 2x10 ea.         Hip Add Iso  20x5\"  20 x 5\"        Standing hip extension    2x10 B/L " "     Bridges  20x5\"  20 x 5\"  20 x 5'' 20 x 5\"  20x5\"   RTB 20x5\"   RTB   Open books   10'' x 10    10x10\"  10x10\"   Ther Ex  1/27 1/30 2/6 2/13 2/20 2/25   Upright bike 6'         Brock rows  18# 3x10         [place] lat pull downs 14# 3x10         Prayer Stretch 3 Way  10x10\" 10 X 10\"  10x10\" ea 10x10\" ea 10x10\" ea   T/S Ext   15x  15 x   15x  15x  10\"x10   Seated rotation stretch   10 x 5\" ea   15x5\" ea 10\"x10 ea   Lat/Rhomboid Pull Stretch      10x10\"  10x10\"             Ther Activity          Guitar shoulder 12#                    Gait Training                              Modalities  1/27 1/30 2/6 2/13 2/20 2/25   MHP   10'  10' pre 10' pre 10'  10' seated 10' seated                    "

## 2025-02-27 ENCOUNTER — OFFICE VISIT (OUTPATIENT)
Age: 60
End: 2025-02-27
Payer: COMMERCIAL

## 2025-02-27 DIAGNOSIS — G89.29 CHRONIC BACK PAIN, UNSPECIFIED BACK LOCATION, UNSPECIFIED BACK PAIN LATERALITY: Primary | ICD-10-CM

## 2025-02-27 DIAGNOSIS — M54.2 CERVICAL PAIN (NECK): ICD-10-CM

## 2025-02-27 DIAGNOSIS — M54.9 CHRONIC BACK PAIN, UNSPECIFIED BACK LOCATION, UNSPECIFIED BACK PAIN LATERALITY: Primary | ICD-10-CM

## 2025-02-27 PROCEDURE — 97112 NEUROMUSCULAR REEDUCATION: CPT

## 2025-02-27 PROCEDURE — 97110 THERAPEUTIC EXERCISES: CPT

## 2025-02-27 PROCEDURE — 97140 MANUAL THERAPY 1/> REGIONS: CPT

## 2025-02-27 NOTE — PROGRESS NOTES
"Daily Note     Today's date: 2025  Patient name: Dario Tolliver  : 1965  MRN: [id number]  Referring provider: Dani Kaiser DO  Dx:   Encounter Diagnosis     ICD-10-CM    1. Chronic back pain, unspecified back location, unspecified back pain laterality  M54.9     G89.29       2. Cervical pain (neck)  M54.2                      Subjective: Patient reports his back felt good after last visit. Next day was sore but today is better.       Objective: See treatment diary below      Assessment: Dario Tolliver tolerated treatment well. Good response to mobility treatment. Continued treatment indicated.       Plan: Continue per plan of care.      Precautions: none   POC expires Unit limit Auth Expiration date PT/OT/ST + Visit Limit?   25 BOMN no BOMN                             Visit/Unit Tracking  AUTH Status:  Date  2      no Used                Remaining                  Daily Treatment Log  Manuals    SI distract          Hip PROM          Lat distract Hip          P-A mobs Lumbar and Thoracic Spine GRC GRC GRC GRC  GRC GRC   Foam Roller SJ  GRC GRC  GRC SJ   IASTM    GRC      Cupping T-spine/L-spine    GRC  Static 2', IR/ER 2', quadruped rocks 2', thread the needle 2'      FMP lumbar flexion   GRC       Laser     GRC  GRC    Neuro Re-Ed    DKTC supine 10x10\" 10x10\" 10'' x 10  10x10\"  10x10\"  10x10\"    LTR 10x10\"      10x10\"   Figure 4 stretch with inf glide          Hip CARS          QL stretch          MF rot/press Brock Press   15#   20x ea    Brock Press   12#   30x ea [place] Press   12#   30x ea Brock Press   15#   30x ea   HS stretch          Supine ppt 20x5\"   20 x 5\"      20 x 5\"  20x5\"  20x5\"    Sitting Sciatic nerve sliders          Hip Add Iso  20x5\"  20 x 5\"        Standing hip extension    2x10 B/L      Bridges 20x5\"   RTB 20x5\"  20 x 5\"  20 x 5'' 20 x 5\"  20x5\"   RTB 20x5\"   RTB " "  Open books 10x10\"  10'' x 10    10x10\"  10x10\"   Ther Ex 2/27 1/27 1/30 2/6 2/13 2/20 2/25   Upright bike          [place] rows           [place] lat pull downs          Prayer Stretch 3 Way 10x10\" ea 10x10\" 10 X 10\"  10x10\" ea 10x10\" ea 10x10\" ea   T/S Ext  10\"x10 15x  15 x   15x  15x  10\"x10   Seated rotation stretch 10\"x10 ea  10 x 5\" ea   15x5\" ea 10\"x10 ea   Lat/Rhomboid Pull Stretch 10x10\"     10x10\"  10x10\"             Ther Activity          Guitar shoulder 12#                    Gait Training                              Modalities 2/27 1/27 1/30 2/6 2/13 2/20 2/25   MHP  10' seated 10'  10' pre 10' pre 10'  10' seated 10' seated                      "

## 2025-03-04 ENCOUNTER — OFFICE VISIT (OUTPATIENT)
Age: 60
End: 2025-03-04
Payer: COMMERCIAL

## 2025-03-04 DIAGNOSIS — G89.29 CHRONIC BACK PAIN, UNSPECIFIED BACK LOCATION, UNSPECIFIED BACK PAIN LATERALITY: Primary | ICD-10-CM

## 2025-03-04 DIAGNOSIS — M54.9 CHRONIC BACK PAIN, UNSPECIFIED BACK LOCATION, UNSPECIFIED BACK PAIN LATERALITY: Primary | ICD-10-CM

## 2025-03-04 DIAGNOSIS — M54.2 CERVICAL PAIN (NECK): ICD-10-CM

## 2025-03-04 PROCEDURE — 97112 NEUROMUSCULAR REEDUCATION: CPT

## 2025-03-04 PROCEDURE — 97110 THERAPEUTIC EXERCISES: CPT

## 2025-03-04 PROCEDURE — 97140 MANUAL THERAPY 1/> REGIONS: CPT

## 2025-03-06 ENCOUNTER — APPOINTMENT (OUTPATIENT)
Age: 60
End: 2025-03-06
Payer: COMMERCIAL

## 2025-03-11 ENCOUNTER — APPOINTMENT (OUTPATIENT)
Age: 60
End: 2025-03-11
Payer: COMMERCIAL

## 2025-03-13 ENCOUNTER — OFFICE VISIT (OUTPATIENT)
Age: 60
End: 2025-03-13
Payer: COMMERCIAL

## 2025-03-13 DIAGNOSIS — M54.2 CERVICAL PAIN (NECK): ICD-10-CM

## 2025-03-13 DIAGNOSIS — G89.29 CHRONIC BACK PAIN, UNSPECIFIED BACK LOCATION, UNSPECIFIED BACK PAIN LATERALITY: Primary | ICD-10-CM

## 2025-03-13 DIAGNOSIS — M54.9 CHRONIC BACK PAIN, UNSPECIFIED BACK LOCATION, UNSPECIFIED BACK PAIN LATERALITY: Primary | ICD-10-CM

## 2025-03-13 PROCEDURE — 97110 THERAPEUTIC EXERCISES: CPT

## 2025-03-13 PROCEDURE — 97112 NEUROMUSCULAR REEDUCATION: CPT

## 2025-03-13 NOTE — PROGRESS NOTES
"Daily Note     Today's date: 3/13/2025  Patient name: Dario Tolliver  : 1965  MRN: [id number]  Referring provider: Dani Kaiser DO  Dx:   Encounter Diagnosis     ICD-10-CM    1. Chronic back pain, unspecified back location, unspecified back pain laterality  M54.9     G89.29       2. Cervical pain (neck)  M54.2                      Subjective: Patient reports his mid back is better but low back is really sore.       Objective: See treatment diary below      Assessment: Dario Tolliver tolerated focused core stability program. Movement pattern appeared to improve with repetitions of exercises. Continued treatment indicated.       Plan: Continue per plan of care.      Precautions: none   POC expires Unit limit Auth Expiration date PT/OT/ST + Visit Limit?   25 BOMN no BOMN                             Visit/Unit Tracking  AUTH Status:  Date 1/14 1/23 1/27 1/30 2/6 2/13 2/20 2/25 2/27 3/4 3/13    no Used                Remaining                  Daily Treatment Log  Manuals 2/27 3/4 3/13 2/6 2/13 2/20 2/25   SI distract          Hip PROM          Lat distract Hip          P-A mobs Lumbar and Thoracic Spine GRC GRC  GRC  GRC GRC   Foam Roller SJ SJ  GRC  GRC SJ   IASTM    GRC      Cupping T-spine/L-spine    GRC  Static 2', IR/ER 2', quadruped rocks 2', thread the needle 2'      FMP lumbar flexion          Laser     GRC  GRC    Neuro Re-Ed 2/27 3/4 3/13 2/6 2/13 2/20 2/25   DKTC supine 10x10\" 10\"x10 10\"x10  10x10\"  10x10\"  10x10\"    LTR 10x10\" 10\"x10 10\"x10    10x10\"   Figure 4 stretch with inf glide          Hip CARS          QL stretch          MF rot/press [place] Press   15#   20x ea Brock Press   15#   20x ea   Brock Press   12#   30x ea Brock Press   12#   30x ea Brock Press   15#   30x ea   HS stretch          Supine ppt 20x5\"  20x5\" 20x5\"  20 x 5\"  20x5\"  20x5\"    Sitting Sciatic nerve sliders          PPT with        Hip Add Iso          Standing hip extension    2x10 B/L      Bridges " "20x5\"   RTB 20x5\"   RTB 20x5\"   RTB 20 x 5'' 20 x 5\"  20x5\"   RTB 20x5\"   RTB   Open books 10x10\"     10x10\"  10x10\"   Ther Ex 2/27 3/4 3/13 2/6 2/13 2/20 2/25   Upright bike          [place] rows           Powerball crushers   5\"x30       Powerbal diagnols   5\"x20       [place] lat pull downs                              Swimmers stretch  x20        Prayer Stretch 3 Way 10x10\" ea 10\"x10 ea 10\"x10 ea  10x10\" ea 10x10\" ea 10x10\" ea   T/S Ext  10\"x10 10\"x10    15x  15x  10\"x10   Seated rotation stretch 10\"x10 ea 10\"x10 ea    15x5\" ea 10\"x10 ea   Lat/Rhomboid Pull Stretch 10x10\" 10x10\"    10x10\"  10x10\"             Ther Activity          Guitar shoulder 12#                    Gait Training                              Modalities 2/27 3/4 3/13 2/6 2/13 2/20 2/25   MHP  10' seated 10' seated 10' pre 10' pre 10'  10' seated 10' seated                          "

## 2025-03-17 ENCOUNTER — PROCEDURE VISIT (OUTPATIENT)
Age: 60
End: 2025-03-17
Payer: COMMERCIAL

## 2025-03-17 DIAGNOSIS — G89.29 CHRONIC BILATERAL THORACIC BACK PAIN: ICD-10-CM

## 2025-03-17 DIAGNOSIS — M54.50 CHRONIC BILATERAL LOW BACK PAIN WITHOUT SCIATICA: Primary | ICD-10-CM

## 2025-03-17 DIAGNOSIS — M47.816 LUMBAR SPONDYLOSIS: ICD-10-CM

## 2025-03-17 DIAGNOSIS — M47.812 FACET ARTHRITIS OF CERVICAL REGION: ICD-10-CM

## 2025-03-17 DIAGNOSIS — M54.2 NECK PAIN: ICD-10-CM

## 2025-03-17 DIAGNOSIS — M47.816 SPONDYLOSIS OF LUMBAR SPINE: ICD-10-CM

## 2025-03-17 DIAGNOSIS — M54.6 CHRONIC BILATERAL THORACIC BACK PAIN: ICD-10-CM

## 2025-03-17 DIAGNOSIS — M47.814 THORACIC SPONDYLOSIS: ICD-10-CM

## 2025-03-17 DIAGNOSIS — M46.1 SACROILIITIS (HCC): ICD-10-CM

## 2025-03-17 DIAGNOSIS — G89.29 CHRONIC BILATERAL LOW BACK PAIN WITHOUT SCIATICA: Primary | ICD-10-CM

## 2025-03-17 PROCEDURE — 98941 CHIROPRACT MANJ 3-4 REGIONS: CPT | Performed by: CHIROPRACTOR

## 2025-03-17 NOTE — PROGRESS NOTES
Assessment:     1. Chronic bilateral low back pain without sciatica  Ambulatory referral to Spine & Pain Management    Ambulatory Referral to Rheumatology      2. Chronic bilateral thoracic back pain  Ambulatory Referral to Rheumatology      3. Neck pain        4. Thoracic spondylosis  Ambulatory Referral to Rheumatology      5. Spondylosis of lumbar spine  Ambulatory referral to Spine & Pain Management    Ambulatory Referral to Rheumatology      6. Facet arthritis of cervical region  Ambulatory Referral to Rheumatology      7. Lumbar spondylosis  Ambulatory referral to Spine & Pain Management    Ambulatory Referral to Rheumatology      8. Sacroiliitis (HCC)  Ambulatory Referral to Rheumatology            Condition is the same.    PLAN/TREATMENT:    Return for treatment as needed.     Continue using moist heat  Continue home exercises as per PT.   Consider workup for inflammatory arthritis, systemic cause of back pain, excessive spondylosis in spine.    Sees PCP soon.   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 Referred patient to rheumatology to assess for possible systemic causes of spinal pain.  (Also says some joint pain/stiffness in hands has been present lately.     Referred to pain medicine/pmr to consider other treatment options.   Will defer to PMR to order advanced imaging as he states x-rays I ordered initially were not covered. He would like to make sure they are covered.     Treatment:  Myofascial release was performed to the thoracic paraspinals, lumbar paraspinals, quadratus lumborum, gluteus medius, levator scapulae, rhomboids, upper trapezius bilaterally.        Manipulation was performed to the left sacroiliac joint, L5-S1, L4-5 utilizing So drop technique and side-lying flexion distraction as well as side posture mobilization.  Ambulation was performed to the thoracic spine utilizing a posterior to anterior mobilization.  Stairstepping  mobilization was performed to the cervical spine at C4-5, C5-6.  [id number]    Subjective:  Dario is here today with ongoing complaints of neck and back pain.   Patient is feeling the same since last visit.  Get some mild temporary relief after treatment.  Going to PT.  States they are mainly working on the LB.     Objective:  Moderate/severe spasm and tenderness is noted in the lumbar paraspinals, quadratus lumborum, gluteus medius, thoracic paraspinals.moderate spasm and tenderness is noted at the levator scapula, rhomboids, upper trapezius.     Decreased mobility and tenderness is noted at L5-S1, the right sacroiliac joint, left sacroiliac joint, T9-10, T5-6, C5-6.    Mild/moderate tenderness noted at bilateral S/I joints.

## 2025-03-18 ENCOUNTER — OFFICE VISIT (OUTPATIENT)
Age: 60
End: 2025-03-18
Payer: COMMERCIAL

## 2025-03-18 DIAGNOSIS — M54.2 CERVICAL PAIN (NECK): ICD-10-CM

## 2025-03-18 DIAGNOSIS — G89.29 CHRONIC BACK PAIN, UNSPECIFIED BACK LOCATION, UNSPECIFIED BACK PAIN LATERALITY: Primary | ICD-10-CM

## 2025-03-18 DIAGNOSIS — M54.9 CHRONIC BACK PAIN, UNSPECIFIED BACK LOCATION, UNSPECIFIED BACK PAIN LATERALITY: Primary | ICD-10-CM

## 2025-03-18 PROCEDURE — 97530 THERAPEUTIC ACTIVITIES: CPT

## 2025-03-18 NOTE — PROGRESS NOTES
"Daily Note     Today's date: 3/18/2025  Patient name: Dario Tolliver  : 1965  MRN: [id number]  Referring provider: Dani Kaiser DO  Dx:   Encounter Diagnosis     ICD-10-CM    1. Chronic back pain, unspecified back location, unspecified back pain laterality  M54.9     G89.29       2. Cervical pain (neck)  M54.2                      Subjective: Patient reports he has noted no change to this point. His doctor has referred him to pain management.       Objective: See treatment diary below      Assessment: Dario Tolliver  has made little lasting progress towards goals. Additional intervention of pain management indicated. Patient will be placed on hold until follow up with pain management and MRI. We reviewed anatomy and expected interventions and predicted outcomes.       Plan:  Patient on hold until follow up.      Precautions: none   POC expires Unit limit Auth Expiration date PT/OT/ST + Visit Limit?   25 BOMN no BOMN                             Visit/Unit Tracking  AUTH Status:  Date 1/14 1/23 1/27 1/30 2/6 2/13 2/20 2/25 2/27 3/4 3/13 3/18   no Used                Remaining                  Daily Treatment Log  Manuals 2/27 3/4 3/13 3/18 2/13 2/20 2/25   SI distract          Hip PROM          Lat distract Hip          P-A mobs Lumbar and Thoracic Spine GR GR    GR GRC   Foam Roller SJ SJ    GRC SJ   IASTM          Cupping T-spine/L-spine          FMP lumbar flexion          Laser     [facility]    Neuro Re-Ed 2/27 3/4 3/13  2/13 2/20 2/25   DKTC supine 10x10\" 10\"x10 10\"x10  10x10\"  10x10\"  10x10\"    LTR 10x10\" 10\"x10 10\"x10    10x10\"   Figure 4 stretch with inf glide          Hip CARS          QL stretch          MF rot/press [place] Press   15#   20x ea [place] Press   15#   20x ea   [place] Press   12#   30x ea [place] Press   12#   30x ea [place] Press   15#   30x ea   HS stretch          Supine ppt 20x5\"  20x5\" 20x5\"  20 x 5\"  20x5\"  20x5\"    Sitting Sciatic nerve sliders          PPT with " "      Hip Add Iso          Standing hip extension          Bridges 20x5\"   RTB 20x5\"   RTB 20x5\"   RTB  20 x 5\"  20x5\"   RTB 20x5\"   RTB   Open books 10x10\"     10x10\"  10x10\"   Ther Ex 2/27 3/4 3/13  2/13 2/20 2/25   Upright bike          Brock rows           Powerball crushers   5\"x30       Powerbal diagnols   5\"x20       [place] lat pull downs                              Swimmers stretch  x20        Prayer Stretch 3 Way 10x10\" ea 10\"x10 ea 10\"x10 ea  10x10\" ea 10x10\" ea 10x10\" ea   T/S Ext  10\"x10 10\"x10    15x  15x  10\"x10   Seated rotation stretch 10\"x10 ea 10\"x10 ea    15x5\" ea 10\"x10 ea   Lat/Rhomboid Pull Stretch 10x10\" 10x10\"    10x10\"  10x10\"   Patient Education and HEP    SJ      Ther Activity          Guitar shoulder 12#                    Gait Training                              Modalities 2/27 3/4 3/13  2/13 2/20 2/25   MHP  10' seated 10' seated 10' pre 10' pre 10'  10' seated 10' seated                 Access Code: [email or web address]  URL: [email or web address]  Date: 03/18/2025  Prepared by: Sammy Gordillo    Exercises  - Supine Bridge  - 1 x daily - 4 x weekly - 3 sets - 10 reps  - Supine Posterior Pelvic Tilt  - 1 x daily - 4 x weekly - 2 sets - 10 reps - 5 seconds hold  - Supine Lower Trunk Rotation  - 1 x daily - 4 x weekly - 1 sets - 10 reps - 10 seconds hold  - Supine Single Knee to Double Knee to Chest Stretch  - 1 x daily - 4 x weekly - 1 sets - 10 reps - 10 seconds hold  - [place]elying Open Book Thoracic Lumbar Rotation and Extension  - 1 x daily - 4 x weekly - 1 sets - 10 reps - 10 seconds hold           "

## 2025-03-20 ENCOUNTER — APPOINTMENT (OUTPATIENT)
Age: 60
End: 2025-03-20
Payer: COMMERCIAL

## 2025-03-25 ENCOUNTER — APPOINTMENT (OUTPATIENT)
Age: 60
End: 2025-03-25
Payer: COMMERCIAL

## 2025-03-26 ENCOUNTER — OFFICE VISIT (OUTPATIENT)
Age: 60
End: 2025-03-26
Payer: COMMERCIAL

## 2025-03-26 VITALS — HEIGHT: 70 IN | BODY MASS INDEX: 32.51 KG/M2 | WEIGHT: 227.07 LBS

## 2025-03-26 DIAGNOSIS — M47.814 THORACIC SPONDYLOSIS: ICD-10-CM

## 2025-03-26 DIAGNOSIS — M54.50 CHRONIC BILATERAL LOW BACK PAIN WITHOUT SCIATICA: ICD-10-CM

## 2025-03-26 DIAGNOSIS — M53.3 SI (SACROILIAC) JOINT DYSFUNCTION: Primary | ICD-10-CM

## 2025-03-26 DIAGNOSIS — M47.816 SPONDYLOSIS OF LUMBAR SPINE: ICD-10-CM

## 2025-03-26 DIAGNOSIS — M47.816 LUMBAR SPONDYLOSIS: ICD-10-CM

## 2025-03-26 DIAGNOSIS — M47.812 CERVICAL SPONDYLOSIS: ICD-10-CM

## 2025-03-26 DIAGNOSIS — G89.29 CHRONIC BILATERAL LOW BACK PAIN WITHOUT SCIATICA: ICD-10-CM

## 2025-03-26 PROCEDURE — 99204 OFFICE O/P NEW MOD 45 MIN: CPT | Performed by: STUDENT IN AN ORGANIZED HEALTH CARE EDUCATION/TRAINING PROGRAM

## 2025-03-26 RX ORDER — CELECOXIB 200 MG/1
200 CAPSULE ORAL 2 TIMES DAILY PRN
Qty: 60 CAPSULE | Refills: 1 | Status: SHIPPED | OUTPATIENT
Start: 2025-03-26

## 2025-03-26 NOTE — PROGRESS NOTES
Assessment:  1. SI (sacroiliac) joint dysfunction    2. Chronic bilateral low back pain without sciatica    3. Spondylosis of lumbar spine    4. Lumbar spondylosis    5. Thoracic spondylosis    6. Cervical spondylosis      Plan:    Dario Tolliver is a 59 y.o. male who presents for evaluation of neck, thoracic and low back pain and stiffness consistent with spondylosis, myofascial pain and SI joint dysfunction. He reports stiffness throughout his back that started when he was young and has progressed since then. No radicular pain. He is pending evaluation by Rheumatology for possible inflammatory causes of his pain/stiffness. We discussed imaging, rehabilitation, optimization of medications and potential interventions. The following plan was formulated with the patient:    - MRI Cervical, Thoracic Spine and Lumbar spine w/o contrast  - X-ray SI joints  - Celebrex 200mg BID PRN  - Pending Rheumatology referral  - C/w PT/HEP  - Follow up after completion of MRI     Orders Placed This Encounter   Procedures    MRI lumbar spine wo contrast     Standing Status:   Future     Expected Date:   3/26/2025     Expiration Date:   3/26/2029     Scheduling Instructions:      There is no preparation for this test. Please leave your jewelry and valuables at home, wedding rings are the exception. All patients will be required to change into a hospital gown and pants.  Street clothes are not permitted in the MRI.  Magnetic nail polish must be removed prior to arrival for your test. Please bring your insurance cards, a form of photo ID and a list of your medications with you. Arrive 15 minutes prior to your appointment time in order to register. Please bring any prior CT or MRI studies of this area that were not performed at a [facility].            To schedule this appointment, please contact Central Scheduling at [phone].            Prior to your appointment, please make sure you complete the MRI Screening Form when  "you e-Check in for your appointment. This will be available starting 7 days before your appointment in [facility]. You may receive an e-mail with an activation code if you do not have a [employer] account. If you do not have access to a device, we will complete your screening at your appointment.     Reason for Exam:   low back pain     For OP exams needed \"URGENT\", choose the appropriate timeframe below and call Central Scheduling at [phone]. No need to speak with a Radiologist.:   Not URGENT     What is the patient's sedation requirement?:   No Sedation     Does the patient need medication for Claustrophobia? If yes, order medication at this point.:   No     Does the patient wear a life vest, have an implanted cardiac device, a stimulation device, a sleep apnea stimulator, or a breast tissue expansion device?:   No     Release to patient through [employer]:   Immediate    MRI thoracic spine wo contrast     Standing Status:   Future     Expected Date:   3/26/2025     Expiration Date:   3/26/2029     Scheduling Instructions:      There is no preparation for this test. Please leave your jewelry and valuables at home, wedding rings are the exception. All patients will be required to change into a hospital gown and pants.  Street clothes are not permitted in the MRI.  Magnetic nail polish must be removed prior to arrival for your test. Please bring your insurance cards, a form of photo ID and a list of your medications with you. Arrive 15 minutes prior to your appointment time in order to register. Please bring any prior CT or MRI studies of this area that were not performed at a [facility] facility.            To schedule this appointment, please contact Central Scheduling at [phone].            Prior to your appointment, please make sure you complete the MRI Screening Form when you e-Check in for your appointment. This will be available starting 7 days before your appointment in [facility]. You may receive an e-mail " with an activation code if you do not have a [employer] account. If you do not have access to a device, we will complete your screening at your appointment.     Reason for Exam:   midback     What is the patient's sedation requirement?:   No Sedation     Does the patient need medication for Claustrophobia? If yes, order medication at this point.:   No     Does the patient wear a life vest, have an implanted cardiac device, a stimulation device, a sleep apnea stimulator, or a breast tissue expansion device?:   No     Release to patient through [employer]:   Immediate    MRI cervical spine wo contrast     Standing Status:   Future     Expected Date:   3/26/2025     Expiration Date:   3/26/2029     Scheduling Instructions:      There is no preparation for this test. Please leave your jewelry and valuables at home, wedding rings are the exception. Please bring your physician order, insurance cards, a form of photo ID and a list of your medications with you. Arrive 15 minutes prior to your appointment time in order to       register. Please bring any prior CT or MRI studies of this area that were not performed at a [facility].            To schedule this appointment, please contact Central Scheduling at [phone].     What is the patient's sedation requirement?:   No Sedation     Does the patient have metallic implants, such as a pacemaker or shunt?:   No    XR sacroiliac joints < 3 views     Standing Status:   Future     Expected Date:   3/26/2025     Expiration Date:   3/26/2029     Scheduling Instructions:      Bring along any outside films relating to this procedure.           Reason for Exam::   buttock pain       New Medications Ordered This Visit   Medications    celecoxib (CeleBREX) 200 mg capsule     Sig: Take 1 capsule (200 mg total) by mouth 2 (two) times a day as needed for mild pain     Dispense:  60 capsule     Refill:  1       My impressions and treatment recommendations were discussed in detail  with the patient, who verbalized understanding and had no further questions.    History of Present Illness:    Referred by: Clemente Melara, FLOR     Dario Tolliver is a 59 y.o. male who presents for initial evaluation of low back pain and midback pain. Pain started many years ago and is not associated with any inciting event or trauma. He describes the pain as sharp, dull/aching of moderate severity. Pain is rated 8/10 and interferes with daily activities. This pain is constant. Exacerbating factors include standing, bending, walking.     He has tried topicals in past, muscle relaxants which have provided relief.  PT provided no relief, chiropractic sessions with Dr. Aldrich provided moderate relief.     Diagnostic studies include Xray cervical, thoracic and lumbar spine with chronic degenerative changes. On lumbar spine noted to have anterior osteophytesI have personally reviewed pertinent films in PACS.      Pain is causing significant functional limitation resulting in diminished quality of life and impaired age appropriate ADLs.  Denies fever, chills, numbness/tingling, bowel/bladder dysfunction, motor weakness.      I have personally reviewed and/or updated the patient's past medical history, past surgical history, family history, social history, current medications, allergies, and vital signs today.     Review of Systems:    Review of Systems   Constitutional:  Negative for fever and unexpected weight change.   HENT:  Negative for trouble swallowing.    Eyes:  Negative for visual disturbance.   Respiratory:  Negative for shortness of breath and wheezing.    Cardiovascular:  Negative for chest pain and palpitations.   Gastrointestinal:  Negative for constipation, diarrhea and nausea.   Endocrine: Negative for cold intolerance and heat intolerance.   Genitourinary:  Negative for difficulty urinating and frequency.   Musculoskeletal:  Positive for arthralgias, back pain and myalgias. Negative for gait problem  and joint swelling.   Skin:  Negative for rash.   Neurological:  Positive for numbness. Negative for dizziness, syncope, weakness and headaches.   Hematological:  Does not bruise/bleed easily.   Psychiatric/Behavioral:  Negative for dysphoric mood.        Past Medical History:   Diagnosis Date    Allergic     Anxiety     Basal cell carcinoma     Cancer (HCC)     Neck pain on right side        Past Surgical History:   Procedure Laterality Date    HEMORRHOID SURGERY         Family History   Problem Relation Age of Onset    Depression Mother     Breast cancer Mother     No Known Problems Father     Breast cancer Sister        Social History     Occupational History    Not on file   Tobacco Use    Smoking status: Former     Current packs/day: 0.00     Average packs/day: 1.5 packs/day for 15.0 years (22.5 ttl pk-yrs)     Types: Cigarettes     Start date: 1983     Quit date: 1998     Years since quittin.2    Smokeless tobacco: Never   Vaping Use    Vaping status: Never Used   Substance and Sexual Activity    Alcohol use: Yes     Alcohol/week: 15.0 standard drinks of alcohol     Types: 15 Shots of liquor per week     Comment: daily    Drug use: Yes     Frequency: 5.0 times per week     Types: Marijuana    Sexual activity: Not Currently     Partners: Female         Current Outpatient Medications:     albuterol (Ventolin HFA) 90 mcg/act inhaler, Inhale 2 puffs every 6 (six) hours as needed for wheezing, Disp: 18 g, Rfl: 0    aspirin (ECOTRIN LOW STRENGTH) 81 mg EC tablet, Take 81 mg by mouth daily, Disp: , Rfl:     celecoxib (CeleBREX) 200 mg capsule, Take 1 capsule (200 mg total) by mouth 2 (two) times a day as needed for mild pain, Disp: 60 capsule, Rfl: 1    Cholecalciferol (VITAMIN D) 2000 units CAPS, Take 2,000 Units by mouth daily , Disp: , Rfl:     ciclopirox (PENLAC) 8 % solution, Apply topically daily at bedtime, Disp: 6 mL, Rfl: 0    CLOTRIMAZOLE EX, Apply topically 2 (two) times a day , Disp: , Rfl:  "    econazole nitrate 1 % cream, Apply topically daily, Disp: 30 g, Rfl: 1    esomeprazole (NexIUM) 20 mg capsule, Take 20 mg by mouth every other day , Disp: , Rfl:     fexofenadine (ALLEGRA) 180 MG tablet, Take 180 mg by mouth daily, Disp: , Rfl:     psyllium (METAMUCIL) 58.6 % powder, Take 1 packet by mouth 3 (three) times a day, Disp: , Rfl:     Allergies   Allergen Reactions    Lamisil Af Defense [Tolnaftate] Hives    Terbinafine Other (See Comments)       Physical Exam:  Ht 5' 10\" (1.778 m)   Wt 103 kg (227 lb 1.2 oz)   BMI 32.58 kg/m²     Constitutional: normal, well developed, well nourished, alert, in no distress and non-toxic and no overt pain behavior.  HEENT: grossly intact  Neck: supple, symmetric, trachea midline and no masses   Pulmonary:even and unlabored  Cardiovascular:No edema or pitting edema present  Skin:Normal without rashes or lesions and well hydrated  Psychiatric:Mood and affect appropriate  Neurologic:Cranial Nerves II-XII grossly intact    Cervical Musculoskeletal and Neurologic Exam:    Inspection: no atrophy of the UE musculature    Gait: non-antalgic    ROM: limited AROM of the cervical spine in all planes    Sensation: intact and symmetric to light touch in bilateral C5-T1 dermatomes    Strength:       Right Left  Shoulder Abduction 5 5  Elbow Flexion  5 5  Wrist Extension  5 5  Elbow Extension 5 5  Hand   5 5  5th Digit Abduction 5 5      Reflexes: 2+ in bilateral biceps, and triceps. Hilton's negative bilaterally    Palpation: TTP cervical paraspinals and trapezius reproducing referred pain consistent with trigger points    Provocative tests:     Kemps (cervical extension and rotation): pos b/l  Spurling's: negative bilaterally      Lumbar Musculoskeletal and Neurologic Exam:    Inspection: no atrophy of the LE musculature    Gait: non-antalgic gait    ROM: limited AROM of the lumbar spine in all planes; FROM at bilateral hips    Sensation: SILT bilateral L2-S1 " dermatomes    Strength:       Right Left  Hip Flexion  5 5  Knee Extension  5 5  Ankle Dorsiflexion 5 5  Great Toe Extension 5 5  Ankle Plantar Flexion 5 5      Reflexes: 2+ in bilateral knees and ankles.    Palpation: -TTP over bilateral greater trochanters and bilateral SI Joint. Diffusely tender over lumbar paraspinals    Provocative tests:     Seated Slump negative bilaterally    Copeland's: pos b/l    Sacral Compression/thrust test, GRACIE, and Gaenslen's tests: pos b/l    FADIR: negative bilaterally         Imaging  Xray Lumbar Spine 2/6/25   No acute fracture. Intact pedicles.   Five non-rib-bearing lumbar vertebral bodies.   No evidence of instability during flexion/extension.   Multilevel degenerative disc disease with anterior osteophytes throughout the lumbar spine.   Atherosclerotic calcifications. Otherwise unremarkable soft tissues.   IMPRESSION:   No acute osseous abnormality.   Degenerative changes as described.     Xray Thoracic Spine 2/6/25  No acute fracture. Intact pedicles.   Anatomic alignment.   Mild/moderate spondylosis.   No displacement of the paraspinal line.   IMPRESSION:   No acute osseous abnormality.    Xray Cervical Spine 2/11/25  FINDINGS:   No acute fracture or subluxation.   Anatomic alignment.   Endplate change with facet arthrosis throughout the cervical spine consistent with moderate degenerative change.   Normal prevertebral soft tissues.   Clear lung apices.   IMPRESSION:   No acute osseous abnormality

## 2025-03-27 ENCOUNTER — APPOINTMENT (OUTPATIENT)
Age: 60
End: 2025-03-27
Payer: COMMERCIAL

## 2025-04-09 ENCOUNTER — RESULTS FOLLOW-UP (OUTPATIENT)
Age: 60
End: 2025-04-09

## 2025-04-09 LAB
ALBUMIN SERPL-MCNC: 4.6 G/DL (ref 3.6–5.1)
ALBUMIN/GLOB SERPL: 1.8 (CALC) (ref 1–2.5)
ALP SERPL-CCNC: 50 U/L (ref 35–144)
ALT SERPL-CCNC: 20 U/L (ref 9–46)
AST SERPL-CCNC: 17 U/L (ref 10–35)
BASOPHILS # BLD AUTO: 43 CELLS/UL (ref 0–200)
BASOPHILS NFR BLD AUTO: 0.7 %
BILIRUB SERPL-MCNC: 0.6 MG/DL (ref 0.2–1.2)
BUN SERPL-MCNC: 19 MG/DL (ref 7–25)
BUN/CREAT SERPL: NORMAL (CALC) (ref 6–22)
CALCIUM SERPL-MCNC: 9.2 MG/DL (ref 8.6–10.3)
CHLORIDE SERPL-SCNC: 103 MMOL/L (ref 98–110)
CHOLEST SERPL-MCNC: 190 MG/DL
CHOLEST/HDLC SERPL: 3.5 (CALC)
CO2 SERPL-SCNC: 27 MMOL/L (ref 20–32)
CREAT SERPL-MCNC: 0.94 MG/DL (ref 0.7–1.35)
EOSINOPHIL # BLD AUTO: 189 CELLS/UL (ref 15–500)
EOSINOPHIL NFR BLD AUTO: 3.1 %
ERYTHROCYTE [DISTWIDTH] IN BLOOD BY AUTOMATED COUNT: 13 % (ref 11–15)
GFR/BSA.PRED SERPLBLD CYS-BASED-ARV: 93 ML/MIN/1.73M2
GLOBULIN SER CALC-MCNC: 2.6 G/DL (CALC) (ref 1.9–3.7)
GLUCOSE SERPL-MCNC: 89 MG/DL (ref 65–99)
HBA1C MFR BLD: 5.4 % OF TOTAL HGB
HCT VFR BLD AUTO: 46.2 % (ref 38.5–50)
HDLC SERPL-MCNC: 54 MG/DL
HGB BLD-MCNC: 15.5 G/DL (ref 13.2–17.1)
LDLC SERPL CALC-MCNC: 106 MG/DL (CALC)
LYMPHOCYTES # BLD AUTO: 1848 CELLS/UL (ref 850–3900)
LYMPHOCYTES NFR BLD AUTO: 30.3 %
MCH RBC QN AUTO: 32.4 PG (ref 27–33)
MCHC RBC AUTO-ENTMCNC: 33.5 G/DL (ref 32–36)
MCV RBC AUTO: 96.7 FL (ref 80–100)
MONOCYTES # BLD AUTO: 470 CELLS/UL (ref 200–950)
MONOCYTES NFR BLD AUTO: 7.7 %
NEUTROPHILS # BLD AUTO: 3550 CELLS/UL (ref 1500–7800)
NEUTROPHILS NFR BLD AUTO: 58.2 %
NONHDLC SERPL-MCNC: 136 MG/DL (CALC)
PLATELET # BLD AUTO: 149 THOUSAND/UL (ref 140–400)
PMV BLD REES-ECKER: 12.7 FL (ref 7.5–12.5)
POTASSIUM SERPL-SCNC: 3.9 MMOL/L (ref 3.5–5.3)
PROT SERPL-MCNC: 7.2 G/DL (ref 6.1–8.1)
RBC # BLD AUTO: 4.78 MILLION/UL (ref 4.2–5.8)
SODIUM SERPL-SCNC: 139 MMOL/L (ref 135–146)
TRIGL SERPL-MCNC: 181 MG/DL
TSH SERPL-ACNC: 1.36 MIU/L (ref 0.4–4.5)
WBC # BLD AUTO: 6.1 THOUSAND/UL (ref 3.8–10.8)

## 2025-04-13 ENCOUNTER — HOSPITAL ENCOUNTER (OUTPATIENT)
Facility: MEDICAL CENTER | Age: 60
Discharge: HOME | End: 2025-04-13
Payer: COMMERCIAL

## 2025-04-13 DIAGNOSIS — M47.816 SPONDYLOSIS OF LUMBAR SPINE: ICD-10-CM

## 2025-04-13 DIAGNOSIS — M47.814 THORACIC SPONDYLOSIS: ICD-10-CM

## 2025-04-13 DIAGNOSIS — M47.816 LUMBAR SPONDYLOSIS: ICD-10-CM

## 2025-04-13 DIAGNOSIS — G89.29 CHRONIC BILATERAL LOW BACK PAIN WITHOUT SCIATICA: ICD-10-CM

## 2025-04-13 DIAGNOSIS — M54.50 CHRONIC BILATERAL LOW BACK PAIN WITHOUT SCIATICA: ICD-10-CM

## 2025-04-13 PROCEDURE — 72148 MRI LUMBAR SPINE W/O DYE: CPT

## 2025-04-13 PROCEDURE — 72146 MRI CHEST SPINE W/O DYE: CPT

## 2025-04-15 ENCOUNTER — HOSPITAL ENCOUNTER (OUTPATIENT)
Facility: MEDICAL CENTER | Age: 60
Discharge: HOME | End: 2025-04-15
Payer: COMMERCIAL

## 2025-04-15 ENCOUNTER — OFFICE VISIT (OUTPATIENT)
Age: 60
End: 2025-04-15
Payer: COMMERCIAL

## 2025-04-15 VITALS
SYSTOLIC BLOOD PRESSURE: 124 MMHG | HEART RATE: 88 BPM | HEIGHT: 70 IN | WEIGHT: 231 LBS | TEMPERATURE: 97.5 F | BODY MASS INDEX: 33.07 KG/M2 | DIASTOLIC BLOOD PRESSURE: 74 MMHG | OXYGEN SATURATION: 96 %

## 2025-04-15 DIAGNOSIS — E78.2 MIXED HYPERLIPIDEMIA: ICD-10-CM

## 2025-04-15 DIAGNOSIS — I10 ESSENTIAL HYPERTENSION: ICD-10-CM

## 2025-04-15 DIAGNOSIS — M54.50 CHRONIC BILATERAL LOW BACK PAIN WITHOUT SCIATICA: ICD-10-CM

## 2025-04-15 DIAGNOSIS — G89.29 CHRONIC BILATERAL LOW BACK PAIN WITHOUT SCIATICA: ICD-10-CM

## 2025-04-15 DIAGNOSIS — M47.812 CERVICAL SPONDYLOSIS: ICD-10-CM

## 2025-04-15 DIAGNOSIS — K21.9 GASTROESOPHAGEAL REFLUX DISEASE, UNSPECIFIED WHETHER ESOPHAGITIS PRESENT: ICD-10-CM

## 2025-04-15 DIAGNOSIS — I25.10 CORONARY ARTERY DISEASE INVOLVING NATIVE CORONARY ARTERY OF NATIVE HEART WITHOUT ANGINA PECTORIS: Primary | ICD-10-CM

## 2025-04-15 PROCEDURE — 72141 MRI NECK SPINE W/O DYE: CPT

## 2025-04-15 PROCEDURE — 99214 OFFICE O/P EST MOD 30 MIN: CPT | Performed by: FAMILY MEDICINE

## 2025-04-15 NOTE — PROGRESS NOTES
"Name: Dario Tolliver      : 1965      MRN: [id number]  Encounter Provider: Manohar Kaiser DO  Encounter Date: 4/15/2025   Encounter department: [facility]  :  Assessment & Plan  Coronary artery disease involving native coronary artery of native heart without angina pectoris  Stable continue with current treatment         Gastroesophageal reflux disease, unspecified whether esophagitis present  Reflux stable at this time.  Continue with current treatment.         Essential hypertension  Stable at this time continue with appropriate diet and exercise         Mixed hyperlipidemia  Hyperlipidemia improved significantly.  Labs reviewed.         Chronic bilateral low back pain without sciatica                History of Present Illness   Patient is here with follow-up on CAD, GERD.  Reflux has been stable.  Patient using Nexium every other day.  No vomiting.  Some slight change in bowel habits.  Patient does use Metamucil daily.  Urination normal at this time.  No chest pain.  No dizziness.  Patient status post URI symptoms.  Patient had laboratory studies done    Hypertension      Review of Systems   Constitutional: Negative.    HENT: Negative.     Eyes: Negative.    Respiratory: Negative.     Cardiovascular: Negative.    Gastrointestinal: Negative.    Endocrine: Negative.    Genitourinary: Negative.    Musculoskeletal: Negative.    Skin: Negative.    Allergic/Immunologic: Negative.    Neurological: Negative.    Hematological: Negative.    Psychiatric/Behavioral: Negative.         Objective   /74 (BP Location: Right arm, Patient Position: Sitting, Cuff Size: Standard)   Pulse 88   Temp 97.5 °F (36.4 °C) (Temporal)   Ht 5' 10\" (1.778 m)   Wt 105 kg (231 lb)   SpO2 96%   BMI 33.15 kg/m²      Physical Exam  Vitals and nursing note reviewed.   Constitutional:       General: He is not in acute distress.     Appearance: He is well-developed. He is not diaphoretic.   HENT:      Head: " Normocephalic and atraumatic.      Right Ear: Tympanic membrane, ear canal and external ear normal.      Left Ear: Tympanic membrane, ear canal and external ear normal.      Nose: Nose normal.      Mouth/Throat:      Mouth: Mucous membranes are moist.      Pharynx: No oropharyngeal exudate or posterior oropharyngeal erythema.   Eyes:      General:         Right eye: No discharge.         Left eye: No discharge.   Neck:      Thyroid: No thyromegaly.      Vascular: No carotid bruit.      Trachea: No tracheal deviation.   Cardiovascular:      Rate and Rhythm: Normal rate and regular rhythm.      Pulses: Normal pulses.      Heart sounds: Normal heart sounds. No murmur heard.     No gallop.   Pulmonary:      Effort: Pulmonary effort is normal. No respiratory distress.      Breath sounds: Normal breath sounds. No stridor. No wheezing or rales.   Chest:      Chest wall: No tenderness.   Musculoskeletal:         General: No tenderness or deformity. Normal range of motion.      Cervical back: Normal range of motion and neck supple.   Lymphadenopathy:      Cervical: No cervical adenopathy.   Skin:     General: Skin is warm and dry.      Coloration: Skin is not pale.      Findings: No erythema or rash.   Neurological:      Mental Status: He is alert and oriented to person, place, and time.      Cranial Nerves: No cranial nerve deficit.      Motor: No abnormal muscle tone.      Coordination: Coordination normal.      Deep Tendon Reflexes: Reflexes normal.   Psychiatric:         Behavior: Behavior normal.         Thought Content: Thought content normal.         Judgment: Judgment normal.

## 2025-04-17 ENCOUNTER — RESULTS FOLLOW-UP (OUTPATIENT)
Dept: PAIN MEDICINE | Facility: CLINIC | Age: 60
End: 2025-04-17

## 2025-04-17 NOTE — TELEPHONE ENCOUNTER
----- Message from Lizeth Monique MD sent at 4/17/2025 10:23 AM EDT -----  Multilevel degenerative changes in cervical spine resulting in moderate to severe right greater than left foraminal stenosis as described above. Will discuss further at [facility] scheduled for next week  ----- Message -----  From: Interface, Radiology Results In  Sent: 4/16/2025   9:50 AM EDT  To: Lizeth Monique MD

## 2025-04-23 ENCOUNTER — OFFICE VISIT (OUTPATIENT)
Age: 60
End: 2025-04-23
Payer: COMMERCIAL

## 2025-04-23 VITALS
WEIGHT: 230 LBS | OXYGEN SATURATION: 99 % | DIASTOLIC BLOOD PRESSURE: 82 MMHG | TEMPERATURE: 98.5 F | BODY MASS INDEX: 32.93 KG/M2 | HEART RATE: 70 BPM | SYSTOLIC BLOOD PRESSURE: 136 MMHG | HEIGHT: 70 IN

## 2025-04-23 DIAGNOSIS — J01.00 ACUTE NON-RECURRENT MAXILLARY SINUSITIS: Primary | ICD-10-CM

## 2025-04-23 PROCEDURE — 99213 OFFICE O/P EST LOW 20 MIN: CPT | Performed by: FAMILY MEDICINE

## 2025-04-23 RX ORDER — LEVOFLOXACIN 500 MG/1
500 TABLET, FILM COATED ORAL EVERY 24 HOURS
Qty: 7 TABLET | Refills: 0 | Status: SHIPPED | OUTPATIENT
Start: 2025-04-23 | End: 2025-05-01

## 2025-04-23 NOTE — ASSESSMENT & PLAN NOTE
Patient is levofloxacin.    Orders:    levofloxacin (LEVAQUIN) 500 mg tablet; Take 1 tablet (500 mg total) by mouth every 24 hours for 7 days

## 2025-04-23 NOTE — PROGRESS NOTES
Name: Dario Tolliver      : 1965      MRN: [id number]  Encounter Provider: Manohar Kaiser DO  Encounter Date: 2025   Encounter department: [facility]IMARY [facility]  :  Assessment & Plan  Acute non-recurrent maxillary sinusitis  Patient is levofloxacin.    Orders:    levofloxacin (LEVAQUIN) 500 mg tablet; Take 1 tablet (500 mg total) by mouth every 24 hours for 7 days          Depression Screening and Follow-up Plan: Patient was screened for depression during today's encounter. They screened negative with a PHQ-2 score of 0.        History of Present Illness   Patient is here with sinus pressure coughing sore throat rhinorrhea as well as some other congestion over the past 2 days.  Patient to use Robitussin as well as DayQuil.  Patient with some sputum production with coughing.  No vomiting or diarrhea associated with this.  Some fatigue associated with this.  No fever noted.  Loss of smell or taste.  Patient did have URI symptoms roughly 2 weeks ago and never resolved.  Some shortness of breath.  Patient did use albuterol also.    Sinus Problem  Associated symptoms include congestion, coughing, headaches, a hoarse voice, shortness of breath, sinus pressure, a sore throat and swollen glands. Pertinent negatives include no ear pain or neck pain.   Sore Throat   This is a recurrent problem. The current episode started yesterday. The problem has been gradually worsening. Neither side of throat is experiencing more pain than the other. There has been no fever. The pain is at a severity of 7/10. The pain is mild. Associated symptoms include congestion, coughing, headaches, a hoarse voice, shortness of breath, stridor, swollen glands and trouble swallowing. Pertinent negatives include no abdominal pain, diarrhea, drooling, ear discharge, ear pain, plugged ear sensation, neck pain or vomiting.     Review of Systems   HENT:  Positive for congestion, hoarse voice, postnasal drip, rhinorrhea, sinus  "pressure, sinus pain, sore throat and trouble swallowing. Negative for drooling, ear discharge and ear pain.    Respiratory:  Positive for cough, shortness of breath, wheezing and stridor.    Gastrointestinal:  Negative for abdominal pain, diarrhea and vomiting.   Musculoskeletal:  Negative for neck pain.   Neurological:  Positive for headaches.       Objective   /82 (BP Location: Left arm, Patient Position: Sitting, Cuff Size: Large)   Pulse 70   Temp 98.5 °F (36.9 °C) (Temporal)   Ht 5' 10\" (1.778 m)   Wt 104 kg (230 lb)   SpO2 99%   BMI 33.00 kg/m²      Physical Exam  Vitals and nursing note reviewed.   Constitutional:       General: He is not in acute distress.     Appearance: Normal appearance. He is well-developed. He is not ill-appearing, toxic-appearing or diaphoretic.   HENT:      Head: Normocephalic and atraumatic.      Right Ear: Tympanic membrane, ear canal and external ear normal.      Left Ear: Tympanic membrane, ear canal and external ear normal.      Nose: Nose normal.      Mouth/Throat:      Pharynx: Oropharyngeal exudate and posterior oropharyngeal erythema present.   Eyes:      General:         Right eye: No discharge.         Left eye: No discharge.   Neck:      Thyroid: No thyromegaly.      Vascular: No carotid bruit.      Trachea: No tracheal deviation.   Cardiovascular:      Rate and Rhythm: Normal rate and regular rhythm.      Heart sounds: Normal heart sounds. No murmur heard.     No gallop.   Pulmonary:      Effort: Pulmonary effort is normal. No respiratory distress.      Breath sounds: No stridor. Rhonchi present. No wheezing or rales.      Comments: Mild rhonchi on the right  Chest:      Chest wall: No tenderness.   Musculoskeletal:         General: No tenderness or deformity. Normal range of motion.      Cervical back: Normal range of motion and neck supple.      Right lower leg: No edema.      Left lower leg: No edema.   Lymphadenopathy:      Cervical: No cervical " adenopathy.   Skin:     General: Skin is warm and dry.      Capillary Refill: Capillary refill takes less than 2 seconds.      Coloration: Skin is not pale.      Findings: No erythema or rash.   Neurological:      Mental Status: He is alert and oriented to person, place, and time. Mental status is at baseline.      Cranial Nerves: No cranial nerve deficit.      Motor: No abnormal muscle tone.      Coordination: Coordination normal.      Deep Tendon Reflexes: Reflexes normal.   Psychiatric:         Behavior: Behavior normal.         Thought Content: Thought content normal.         Judgment: Judgment normal.         Answers submitted by the patient for this visit:  Sore Throat Questionnaire (Submitted on 4/23/2025)  Chief Complaint: Sore throat  Chronicity: recurrent  Onset: yesterday  Progression since onset: gradually worsening  Pain worse on: neither  Fever: no fever  pain severity now: mild  Pain - numeric: 7/10  abdominal pain: No  congestion: Yes  cough: Yes  diarrhea: No  drooling: No  ear discharge: No  ear pain: No  headaches: Yes  hoarse voice: Yes  neck pain: No  plugged ear sensation: No  stridor: Yes  shortness of breath: Yes  swollen glands: Yes  trouble swallowing: Yes  vomiting: No

## 2025-04-24 ENCOUNTER — OFFICE VISIT (OUTPATIENT)
Age: 60
End: 2025-04-24
Payer: COMMERCIAL

## 2025-04-24 VITALS — WEIGHT: 229.28 LBS | HEIGHT: 70 IN | BODY MASS INDEX: 32.82 KG/M2

## 2025-04-24 DIAGNOSIS — M47.814 THORACIC SPONDYLOSIS: ICD-10-CM

## 2025-04-24 DIAGNOSIS — G89.29 CHRONIC BILATERAL LOW BACK PAIN WITHOUT SCIATICA: ICD-10-CM

## 2025-04-24 DIAGNOSIS — M79.18 MYOFASCIAL PAIN SYNDROME: ICD-10-CM

## 2025-04-24 DIAGNOSIS — M54.50 CHRONIC BILATERAL LOW BACK PAIN WITHOUT SCIATICA: ICD-10-CM

## 2025-04-24 DIAGNOSIS — M47.816 LUMBAR SPONDYLOSIS: Primary | ICD-10-CM

## 2025-04-24 PROCEDURE — 99214 OFFICE O/P EST MOD 30 MIN: CPT | Performed by: STUDENT IN AN ORGANIZED HEALTH CARE EDUCATION/TRAINING PROGRAM

## 2025-04-24 NOTE — PROGRESS NOTES
Assessment:  1. Lumbar spondylosis    2. Myofascial pain syndrome    3. Thoracic spondylosis    4. Chronic bilateral low back pain without sciatica        Plan:    Dario Tolliver is a 60 y.o. male who presents for follow up office visit in regards to neck, thoracic and low back pain and stiffness consistent with thoracic and lumbar spondylosis, myofascial pain and SI joint dysfunction.     He is pending evaluation by Rheumatology for possible inflammatory causes of his pain/stiffness that started at a young age. We discussed imaging, rehabilitation, optimization of medications and potential interventions. The following plan was formulated with the patient:    - Celebrex 200mg BID PRN.   - Tylenol 1g Q8H PRN for upto 3g/24hrs  - Pending Rheumatology referral given diffuse stiffness/pain along spine that started at a young age, has never seen rheumatology  - C/w PT/HEP  - I offered bilateral sacroiliac joint injections. Literature was provided for his review  - Consider lumbar medial branch blocks and RFA. Literature was provided for his review.  - Follow up in 4-6 weeks    No orders of the defined types were placed in this encounter.      No orders of the defined types were placed in this encounter.      My impressions and treatment recommendations were discussed in detail with the patient, who verbalized understanding and had no further questions.    History of Present Illness:  Dario Tolliver is a 60 y.o. male who presents for a follow up office visit in regards to midback and low back pain. Since last visit pain has improved with celebrex. Current pain medications includes:  Celebrex.    He describes the pain as sharp, dull/aching of moderate severity. Pain is rated 8/10 and interferes with daily activities. This pain is constant. Exacerbating factors include standing, bending, walking.      He has tried topicals in past, muscle relaxants which have provided relief.  PT provided no relief, chiropractic sessions  with Dr. Aldrich provided moderate relief.      Diagnostic studies include Xray cervical, thoracic and lumbar spine with chronic degenerative changes. On lumbar spine noted to have anterior osteophytes.     MRI cervical spine with multilevel right greater than left facet arthopathy and uncovertebral hypertrophy resulting in moderate to severe right greater than left foraminal stenosis.    ENDER thoracic spine with mild degenerative changes of thoracic spine, no significant canal stenosis or foraminal narrowing.    MRI Lumbar spine with multilevel disc bulges with varying degrees of foraminal narrowig, moderate right L4-5.    I have personally reviewed pertinent films in PACS.    Pain is causing significant functional limitation resulting in diminished quality of life and impaired age appropriate ADLs.  Denies fever, chills, numbness/tingling, bowel/bladder dysfunction, motor weakness.    I have personally reviewed and/or updated the patient's past medical history, past surgical history, family history, social history, current medications, allergies, and vital signs today.     Review of Systems   Constitutional:  Negative for chills and fever.   HENT:  Negative for ear pain and sore throat.    Eyes:  Negative for pain and visual disturbance.   Respiratory:  Negative for cough and shortness of breath.    Cardiovascular:  Negative for chest pain and palpitations.   Gastrointestinal:  Negative for abdominal pain and vomiting.   Genitourinary:  Negative for dysuria and hematuria.   Musculoskeletal:  Positive for arthralgias, back pain, myalgias, neck pain and neck stiffness.   Skin:  Negative for color change and rash.   Neurological:  Negative for seizures and syncope.   All other systems reviewed and are negative.      Patient Active Problem List   Diagnosis    Cervical pain (neck)    Esophageal reflux    Facial basal cell cancer    Hyperlipidemia    Vitamin D deficiency    Upper respiratory infection    Coronary  artery disease involving native coronary artery    Chest pain due to myocardial ischemia    Right lateral epicondylitis    Tinea pedis of both feet    Cervical radiculitis    Essential hypertension    Bronchitis    Chronic bilateral low back pain without sciatica    Acute non-recurrent maxillary sinusitis       Past Medical History:   Diagnosis Date    Allergic     Anxiety     Basal cell carcinoma     Cancer (HCC)     GERD (gastroesophageal reflux disease)     Neck pain on right side        Past Surgical History:   Procedure Laterality Date    HEMORRHOID SURGERY         Family History   Problem Relation Age of Onset    Depression Mother     Breast cancer Mother     Cancer Mother     No Known Problems Father     Breast cancer Sister     Cancer Sister        Social History     Occupational History    Not on file   Tobacco Use    Smoking status: Former     Current packs/day: 0.00     Average packs/day: 1.5 packs/day for 15.0 years (22.5 ttl pk-yrs)     Types: Cigarettes     Start date: 1983     Quit date: 1998     Years since quittin.3    Smokeless tobacco: Never   Vaping Use    Vaping status: Never Used   Substance and Sexual Activity    Alcohol use: Yes     Alcohol/week: 15.0 standard drinks of alcohol     Types: 15 Shots of liquor per week     Comment: daily    Drug use: Yes     Frequency: 5.0 times per week     Types: Marijuana    Sexual activity: Not Currently     Partners: Female       Current Outpatient Medications on File Prior to Visit   Medication Sig    albuterol (Ventolin HFA) 90 mcg/act inhaler Inhale 2 puffs every 6 (six) hours as needed for wheezing    aspirin (ECOTRIN LOW STRENGTH) 81 mg EC tablet Take 81 mg by mouth daily    celecoxib (CeleBREX) 200 mg capsule Take 1 capsule (200 mg total) by mouth 2 (two) times a day as needed for mild pain    Cholecalciferol (VITAMIN D) 2000 units CAPS Take 2,000 Units by mouth daily     CLOTRIMAZOLE EX Apply topically 2 (two) times a day     econazole  "nitrate 1 % cream Apply topically daily    esomeprazole (NexIUM) 20 mg capsule Take 20 mg by mouth every other day     fexofenadine (ALLEGRA) 180 MG tablet Take 180 mg by mouth daily    levofloxacin (LEVAQUIN) 500 mg tablet Take 1 tablet (500 mg total) by mouth every 24 hours for 7 days    psyllium (METAMUCIL) 58.6 % powder Take 1 packet by mouth 3 (three) times a day    ciclopirox (PENLAC) 8 % solution Apply topically daily at bedtime (Patient not taking: Reported on 4/24/2025)     No current facility-administered medications on file prior to visit.       Allergies   Allergen Reactions    Lamisil Af Defense [Tolnaftate] Hives    Terbinafine Other (See Comments)       Physical Exam:    Ht 5' 10\" (1.778 m)   Wt 104 kg (229 lb 4.5 oz)   BMI 32.90 kg/m²     Constitutional: normal, well developed, well nourished, alert, in no distress and non-toxic and no overt pain behavior.  HEENT: grossly intact  Neck: supple, symmetric, trachea midline and no masses   Pulmonary:even and unlabored  Cardiovascular:No edema or pitting edema present  Skin:Normal without rashes or lesions and well hydrated  Psychiatric:Mood and affect appropriate  Neurologic:Cranial Nerves II-XII grossly intact    Imaging  MRI Thoracic Spine 4/13/25  FINDINGS:     ALIGNMENT: Mild dextroscoliosis of thoracic spine. No listhesis. No compression fracture.     MARROW SIGNAL: T5, T9, and L1 intraosseous vertebral hemangiomas. Otherwise, normal bone marrow signal.     THORACIC CORD: Normal signal within the thoracic cord.     PARAVERTEBRAL SOFT TISSUES:  Normal.     THORACIC DEGENERATIVE CHANGE: Multilevel endplate osteophytes, small intravertebral herniations, and mild disc height loss. No significant canal stenosis or foraminal narrowing.     OTHER FINDINGS:  None.     IMPRESSION:     No acute abnormality of thoracic spine.     Mild degenerative changes of thoracic spine. No significant canal stenosis or foraminal narrowing.     MRI Lumbar Spine 4/13/25   "   FINDINGS:     VERTEBRAL BODIES:  There are 5 lumbar type vertebral bodies. Mild levoscoliosis of lumbar spine. No listhesis. No compression fracture.     Type I Modic endplate change at anterior superior corner endplate of L5. Incomplete osseous fusion of the posterior midline elements at L5-S1, normal variant. Otherwise, normal bone marrow signal.     SACRUM: Type I Modic endplate change of anterior superior corner endplate of S1. Otherwise, normal bone marrow signal in visualized sacrum. No evidence of insufficiency or stress fracture. Partially imaged degenerative changes of bilateral sacroiliac   joints with right anterior bridging osteophyte.     DISTAL CORD AND CONUS:  Normal size and signal within the distal cord and conus.     PARASPINAL SOFT TISSUES:  Paraspinal soft tissues are unremarkable.     LOWER THORACIC DISC SPACES: Please see same-day MRI thoracic spine without contrast for further evaluation.     LUMBAR DISC SPACES: Multilevel endplate osteophytes, small intervertebral herniations, mild disc height loss, facet arthropathy.     L1-L2: Disc bulge. No significant canal stenosis or foraminal narrowing.     L2-L3: Disc bulge. Facet arthropathy. No significant canal stenosis or foraminal narrowing.     L3-L4: Disc bulge, right foraminal disc protrusion contacts right L3 exiting nerve. Facet arthropathy. No significant canal stenosis. Mild bilateral foraminal narrowing.     L4-L5: Disc bulge, narrowed bilateral subarticular zones. Facet arthropathy, ligamentum flavum thickening. No significant canal stenosis. Moderate right and mild left foraminal narrowing.     L5-S1: Disc bulge, narrowed right subarticular zone, small posterior annular fissure. Right facet arthropathy. No significant canal stenosis. Mild bilateral foraminal narrowing (right worse than left).     OTHER FINDINGS: Partially imaged colonic diverticulosis.   IMPRESSION:   Multilevel degenerative changes of lumbar spine with varying  degrees of foraminal narrowing (moderate right L4-L5), as detailed above. No significant canal stenosis.       MRI Cervical Spine   ALIGNMENT:  Normal alignment of the cervical spine.  No compression fracture.  No subluxation.  No scoliosis.     MARROW SIGNAL: Mild edema within the right C5 and C6 articular pillars suggesting active facet arthropathy.     CERVICAL AND VISUALIZED THORACIC CORD:  Normal signal within the visualized cord.     PREVERTEBRAL AND PARASPINAL SOFT TISSUES:  Normal.     VISUALIZED POSTERIOR FOSSA:  The visualized posterior fossa demonstrates no abnormal signal. Visualized intracranial vault demonstrates mucosal thickening in the sphenoid sinus.   CERVICAL DISC SPACES:   C2-C3:  Normal.   C3-C4: Right greater than left facet arthropathy with mild right uncovertebral hypertrophy resulting in severe right and moderate left foraminal stenosis. Shallow right central protrusion. No spinal stenosis.   C4-C5: Right greater than left facet arthropathy with bilateral uncovertebral hypertrophy resulting in severe right and moderate left foraminal stenosis. No spinal canal stenosis. Severe right foraminal stenosis.   C5-C6: Right greater than left facet arthropathy with and moderate right greater than left foraminal stenosis. Mild ligamentum flavum thickening. Minimal spinal stenosis.   C6-C7: Posterior disc osteophyte complex with a tiny central disc protrusion with inferior migration. Right greater than left facet arthropathy and uncovertebral hypertrophy without significant foraminal stenosis.   C7-T1:  Normal.   UPPER THORACIC DISC SPACES:  Normal.   IMPRESSION:   Multilevel cervical spondylosis with right greater than left facet arthropathy and uncovertebral hypertrophy resulting in moderate to severe right greater than left foraminal stenosis as described above. Correlate for right greater than left C4   radiculitis.     No cord signal abnormality.

## 2025-05-01 ENCOUNTER — CONSULT (OUTPATIENT)
Dept: RHEUMATOLOGY | Facility: CLINIC | Age: 60
End: 2025-05-01
Payer: COMMERCIAL

## 2025-05-01 VITALS
DIASTOLIC BLOOD PRESSURE: 70 MMHG | WEIGHT: 233 LBS | BODY MASS INDEX: 33.36 KG/M2 | SYSTOLIC BLOOD PRESSURE: 118 MMHG | HEIGHT: 70 IN | OXYGEN SATURATION: 94 % | HEART RATE: 64 BPM

## 2025-05-01 DIAGNOSIS — M47.812 FACET ARTHRITIS OF CERVICAL REGION: ICD-10-CM

## 2025-05-01 DIAGNOSIS — M54.6 CHRONIC BILATERAL THORACIC BACK PAIN: ICD-10-CM

## 2025-05-01 DIAGNOSIS — G89.29 CHRONIC BILATERAL LOW BACK PAIN WITHOUT SCIATICA: ICD-10-CM

## 2025-05-01 DIAGNOSIS — M47.816 LUMBAR SPONDYLOSIS: ICD-10-CM

## 2025-05-01 DIAGNOSIS — M45.0 ANKYLOSING SPONDYLITIS OF MULTIPLE SITES IN SPINE (HCC): ICD-10-CM

## 2025-05-01 DIAGNOSIS — M47.814 THORACIC SPONDYLOSIS: ICD-10-CM

## 2025-05-01 DIAGNOSIS — G89.29 CHRONIC BILATERAL THORACIC BACK PAIN: ICD-10-CM

## 2025-05-01 DIAGNOSIS — M47.816 SPONDYLOSIS OF LUMBAR SPINE: ICD-10-CM

## 2025-05-01 DIAGNOSIS — M46.1 SACROILIITIS (HCC): ICD-10-CM

## 2025-05-01 DIAGNOSIS — M54.50 CHRONIC BILATERAL LOW BACK PAIN WITHOUT SCIATICA: ICD-10-CM

## 2025-05-01 DIAGNOSIS — L40.50 PSORIATIC ARTHROPATHY (HCC): Primary | ICD-10-CM

## 2025-05-01 PROCEDURE — 99245 OFF/OP CONSLTJ NEW/EST HI 55: CPT | Performed by: INTERNAL MEDICINE

## 2025-05-01 NOTE — ASSESSMENT & PLAN NOTE
Orders:    Ambulatory Referral to Rheumatology    RHEUMATOID FACTOR; Future    Cyclic citrul peptide antibody, IgG; Future    C-reactive protein; Future    HLA-B27 antigen; Future

## 2025-05-01 NOTE — PROGRESS NOTES
Name: Dario Tolliver      : 1965      MRN: [id number]  Encounter Provider: Jef Esposito MD  Encounter Date: 2025   Encounter department: [facility] RHEUMATOLOGY [facility]  :  Assessment & Plan  Chronic bilateral low back pain without sciatica    Orders:    Ambulatory Referral to Rheumatology    RHEUMATOID FACTOR; Future    Cyclic citrul peptide antibody, IgG; Future    C-reactive protein; Future    HLA-B27 antigen; Future    Chronic bilateral thoracic back pain    Orders:    Ambulatory Referral to Rheumatology    RHEUMATOID FACTOR; Future    Cyclic citrul peptide antibody, IgG; Future    C-reactive protein; Future    HLA-B27 antigen; Future    Psoriatic arthropathy (HCC)  This is a 59 y/o male with h/o psoriasis and chronic back pain since his 20s  Reviewed MRI cervical, thoracic and lumbar spine  Reviewed PsA  Check RF, CCP, HLA B27  Agree with celebrex   F/u pain management for injections as needed  RTC 3 months  Orders:    RHEUMATOID FACTOR; Future    Cyclic citrul peptide antibody, IgG; Future    C-reactive protein; Future    HLA-B27 antigen; Future    Ankylosing spondylitis of multiple sites in spine (HCC)  Reviewed TNFs and provided handouts for Humira  He will review and call me to prior auth  Will need updated hepatitis and Quantiferon    Orders:    RHEUMATOID FACTOR; Future    Cyclic citrul peptide antibody, IgG; Future    C-reactive protein; Future    HLA-B27 antigen; Future      This is a Rheumatology Consult seen at the request of Dr. Kaiser     History of Present Illness   HPI  Dario Tolliver is a 60 y.o. male who presents for further evaluation chronic back pain  History obtained from: patient    He has past medical history GERD, anxiety, basal cell CA, psoriasis     He mentions he has had chronic thoracic and lumbar pain since 20s. Neck is stiffness and lost range of motion    Was recently seen per Chiropractor and referred to pain management    Was Rxd Celebrex which  is helping    MRI lumbar spine with anterior osteophytes     Follows with outside dermatologist with h/o psoriasis since 20s     Review of Systems  Pertinent Medical History           --------------------------------------------------------------------------------------------------------        ROS:        - for: Fevers, Chills or sweats.  No HAs or scalp tenderness.  No jaw claudication.  No acute visual or eye changes.  No dry eyes.  No auditory complaints.  No oral lesions or ulcers.  No dry mouth.  No sore throat or cough.  No chest pains or palpitations.  No shortness of breath, dyspnea on exertion or wheezing.  No hemotpysis.  No abdominal pain, GERD symptoms, diarrhea or constipation.  No urinary complaints.  No numbness, tingling or weakness.  No rashes.    All other ROS was reviewed and negative except as above         --------------------------------------------------------------------------------------------------------    Past Medical History    Past Medical History:   Diagnosis Date    Allergic     Anxiety     Basal cell carcinoma     Cancer (HCC)     GERD (gastroesophageal reflux disease)     Neck pain on right side            Past Surgical History    Past Surgical History:   Procedure Laterality Date    HEMORRHOID SURGERY             Family History    Family History   Problem Relation Age of Onset    Depression Mother     Breast cancer Mother     Cancer Mother     No Known Problems Father     Breast cancer Sister     Cancer Sister             Social History    Social History     Tobacco Use    Smoking status: Former     Current packs/day: 0.00     Average packs/day: 1.5 packs/day for 15.0 years (22.5 ttl pk-yrs)     Types: Cigarettes     Start date: 1983     Quit date: 1998     Years since quittin.3    Smokeless tobacco: Never   Vaping Use    Vaping status: Never Used   Substance Use Topics    Alcohol use: Yes     Alcohol/week: 15.0 standard drinks of alcohol     Types: 15 Shots of liquor  per week     Comment: daily    Drug use: Yes     Frequency: 5.0 times per week     Types: Marijuana     Tech support       Allergies    Allergies   Allergen Reactions    Lamisil Af Defense [Tolnaftate] Hives    Terbinafine Other (See Comments)         Medications    Current Outpatient Medications   Medication Instructions    albuterol (Ventolin HFA) 90 mcg/act inhaler 2 puffs, Inhalation, Every 6 hours PRN    aspirin (ECOTRIN LOW STRENGTH) 81 mg, Daily    celecoxib (CELEBREX) 200 mg, Oral, 2 times daily PRN    ciclopirox (PENLAC) 8 % solution Topical, Daily at bedtime    CLOTRIMAZOLE EX 2 times daily    econazole nitrate 1 % cream Topical, Daily    esomeprazole (NEXIUM) 20 mg, Every 48 hours    fexofenadine (ALLEGRA) 180 mg, Daily    levofloxacin (LEVAQUIN) 500 mg, Oral, Every 24 hours    psyllium (METAMUCIL) 58.6 % powder 1 packet, 3 times daily    Vitamin D 2,000 Units, Daily            ________________________________________________________________________    Results Review    Study Result    Narrative & Impression   MRI LUMBAR SPINE WITHOUT CONTRAST     INDICATION: M54.50: Low back pain, unspecified  G89.29: Other chronic pain  M47.816: Spondylosis without myelopathy or radiculopathy, lumbar region.     COMPARISON: Same day MR thoracic spine without contrast. Lumbar spine radiograph 2/16/2025. CT abdomen pelvis with contrast 10/8/2010.     TECHNIQUE:  Multiplanar, multisequence imaging of the lumbar spine was performed. .        IMAGE QUALITY:  Diagnostic     FINDINGS:     VERTEBRAL BODIES:  There are 5 lumbar type vertebral bodies. Mild levoscoliosis of lumbar spine. No listhesis. No compression fracture.     Type I Modic endplate change at anterior superior corner endplate of L5. Incomplete osseous fusion of the posterior midline elements at L5-S1, normal variant. Otherwise, normal bone marrow signal.     SACRUM: Type I Modic endplate change of anterior superior corner endplate of S1. Otherwise, normal bone  "marrow signal in visualized sacrum. No evidence of insufficiency or stress fracture. Partially imaged degenerative changes of bilateral sacroiliac   joints with right anterior bridging osteophyte.     DISTAL CORD AND CONUS:  Normal size and signal within the distal cord and conus.     PARASPINAL SOFT TISSUES:  Paraspinal soft tissues are unremarkable.     LOWER THORACIC DISC SPACES: Please see same-day MRI thoracic spine without contrast for further evaluation.     LUMBAR DISC SPACES: Multilevel endplate osteophytes, small intervertebral herniations, mild disc height loss, facet arthropathy.     L1-L2: Disc bulge. No significant canal stenosis or foraminal narrowing.     L2-L3: Disc bulge. Facet arthropathy. No significant canal stenosis or foraminal narrowing.     L3-L4: Disc bulge, right foraminal disc protrusion contacts right L3 exiting nerve. Facet arthropathy. No significant canal stenosis. Mild bilateral foraminal narrowing.     L4-L5: Disc bulge, narrowed bilateral subarticular zones. Facet arthropathy, ligamentum flavum thickening. No significant canal stenosis. Moderate right and mild left foraminal narrowing.     L5-S1: Disc bulge, narrowed right subarticular zone, small posterior annular fissure. Right facet arthropathy. No significant canal stenosis. Mild bilateral foraminal narrowing (right worse than left).     OTHER FINDINGS: Partially imaged colonic diverticulosis.     IMPRESSION:     Multilevel degenerative changes of lumbar spine with varying degrees of foraminal narrowing (moderate right L4-L5), as detailed above. No significant canal stenosis.     Additional chronic/incidental findings as detailed above.                            Objective   /70   Pulse 64   Ht 5' 10\" (1.778 m)   Wt 106 kg (233 lb)   SpO2 94%   BMI 33.43 kg/m²      Physical Exam    GEN: AAO, No apparent distress.  Patient is well developed.  HEENT:  Pupils are equal, round and reactive.  Sclera are clear.  " Fundoscopic exam is normal.  External ears are without lesions.  Oral pharynx is clear of ulcers or other lesions.  MMM.   NECK:  Supple.  There is no adenopathy appreciable in anterior or posterior cervical chains or supraclavicularly.  JVP is normal.    HEART: Regular rate and rhythm.  There is no appreciable murmur, gallop or rub.  LUNGS: Clear to auscultation.  ABD:  Soft, without tenderness, rebound or guarding.  No appreciable organomegally.  NEURO: Speech and cognition are normal.  Strength is 5/5 throughout.  Tone is normal.  DTRs are 2/4 at the knees, ankles and elbows.  Gait is normal.  SKIN: There are no rashes or lesions    MUSCULOSKELETAL:   No synovitis noted      Thank you for involving me in this patient's care.        Jef Esposito MD  [facility]      I have spent a total time of 62 minutes in caring for this patient on the day of the visit/encounter including Diagnostic results, Prognosis, Risks and benefits of tx options, Instructions for management, Risk factor reductions, Impressions, Counseling / Coordination of care, Documenting in the medical record, Reviewing/placing orders in the medical record (including tests, medications, and/or procedures), and Obtaining or reviewing history  .

## 2025-05-23 PROBLEM — J01.00 ACUTE NON-RECURRENT MAXILLARY SINUSITIS: Status: RESOLVED | Noted: 2025-04-23 | Resolved: 2025-05-23

## 2025-05-30 ENCOUNTER — NURSE TRIAGE (OUTPATIENT)
Age: 60
End: 2025-05-30

## 2025-05-30 ENCOUNTER — OFFICE VISIT (OUTPATIENT)
Age: 60
End: 2025-05-30
Payer: COMMERCIAL

## 2025-05-30 VITALS
WEIGHT: 234 LBS | HEART RATE: 84 BPM | SYSTOLIC BLOOD PRESSURE: 138 MMHG | HEIGHT: 70 IN | OXYGEN SATURATION: 98 % | DIASTOLIC BLOOD PRESSURE: 80 MMHG | TEMPERATURE: 98.3 F | BODY MASS INDEX: 33.5 KG/M2

## 2025-05-30 DIAGNOSIS — J20.9 ACUTE BRONCHITIS, UNSPECIFIED ORGANISM: ICD-10-CM

## 2025-05-30 DIAGNOSIS — J01.90 ACUTE SINUSITIS, RECURRENCE NOT SPECIFIED, UNSPECIFIED LOCATION: Primary | ICD-10-CM

## 2025-05-30 LAB
CCP IGG SERPL-ACNC: <16 UNITS
CRP SERPL-MCNC: <3 MG/L
HLA-B27 QL FC: NEGATIVE
RHEUMATOID FACT SERPL-ACNC: 10 IU/ML

## 2025-05-30 PROCEDURE — 99213 OFFICE O/P EST LOW 20 MIN: CPT | Performed by: FAMILY MEDICINE

## 2025-05-30 RX ORDER — FLUTICASONE PROPIONATE 50 MCG
2 SPRAY, SUSPENSION (ML) NASAL DAILY
Qty: 15.8 ML | Refills: 1 | Status: SHIPPED | OUTPATIENT
Start: 2025-05-30

## 2025-05-30 RX ORDER — GUAIFENESIN/DEXTROMETHORPHAN 100-10MG/5
5 SYRUP ORAL 3 TIMES DAILY PRN
Qty: 473 ML | Refills: 1 | Status: SHIPPED | OUTPATIENT
Start: 2025-05-30

## 2025-05-30 RX ORDER — PREDNISONE 20 MG/1
40 TABLET ORAL DAILY
Qty: 10 TABLET | Refills: 0 | Status: SHIPPED | OUTPATIENT
Start: 2025-05-30 | End: 2025-06-04

## 2025-05-30 RX ORDER — FLUTICASONE PROPIONATE AND SALMETEROL 250; 50 UG/1; UG/1
1 POWDER RESPIRATORY (INHALATION) 2 TIMES DAILY
Qty: 60 BLISTER | Refills: 1 | Status: SHIPPED | OUTPATIENT
Start: 2025-05-30

## 2025-05-30 NOTE — TELEPHONE ENCOUNTER
"  REASON FOR CONVERSATION: Sinusitis    SYMPTOMS: sinus pain and pressure, cough, nasal drainage and congestion    OTHER HEALTH INFORMATION: had a similar illness about 1 month ago    PROTOCOL DISPOSITION: See Today or Tomorrow in Office    CARE ADVICE PROVIDED: fluids, OTC meds. Scheduled patient for a same day appointment with Dr. Tinajero    PRACTICE FOLLOW-UP: see patient at 1:15 pm      Reason for Disposition   Patient wants to be seen    Answer Assessment - Initial Assessment Questions  1. LOCATION: \"Where does it hurt?\"       Sinus pain  2. ONSET: \"When did the sinus pain start?\"  (e.g., hours, days)       4days  3. SEVERITY: \"How bad is the pain?\"   (Scale 0-10; or none, mild, moderate or severe)      Mild to moderate  4. RECURRENT SYMPTOM: \"Have you ever had sinus problems before?\" If Yes, ask: \"When was the last time?\" and \"What happened that time?\"       Yes- 1 month agao  5. NASAL CONGESTION: \"Is the nose blocked?\" If Yes, ask: \"Can you open it or must you breathe through your mouth?\"      yes  6. NASAL DISCHARGE: \"Do you have discharge from your nose?\" If so ask, \"What color?\"      Clear drainage  7. FEVER: \"Do you have a fever?\" If Yes, ask: \"What is it, how was it measured, and when did it start?\"       N/A  8. OTHER SYMPTOMS: \"Do you have any other symptoms?\" (e.g., sore throat, cough, earache, difficulty breathing)      Patient had tried nasal decongestant and robitussin  9. PREGNANCY: \"Is there any chance you are pregnant?\" \"When was your last menstrual period?\"      N/A    Protocols used: Sinus Pain or Congestion-Adult-[place]    "

## 2025-06-01 DIAGNOSIS — M47.814 THORACIC SPONDYLOSIS: ICD-10-CM

## 2025-06-01 DIAGNOSIS — M47.816 SPONDYLOSIS OF LUMBAR SPINE: ICD-10-CM

## 2025-06-01 DIAGNOSIS — G89.29 CHRONIC BILATERAL LOW BACK PAIN WITHOUT SCIATICA: ICD-10-CM

## 2025-06-01 DIAGNOSIS — M47.812 CERVICAL SPONDYLOSIS: ICD-10-CM

## 2025-06-01 DIAGNOSIS — M54.50 CHRONIC BILATERAL LOW BACK PAIN WITHOUT SCIATICA: ICD-10-CM

## 2025-06-01 DIAGNOSIS — M47.816 LUMBAR SPONDYLOSIS: ICD-10-CM

## 2025-06-01 DIAGNOSIS — M53.3 SI (SACROILIAC) JOINT DYSFUNCTION: ICD-10-CM

## 2025-06-02 RX ORDER — CELECOXIB 200 MG/1
200 CAPSULE ORAL 2 TIMES DAILY PRN
Qty: 60 CAPSULE | Refills: 1 | OUTPATIENT
Start: 2025-06-02

## 2025-06-02 NOTE — PROGRESS NOTES
Name: Dario Tolliver      : 1965      MRN: [id number]  Encounter Provider: Rasheed Tinajero MD  Encounter Date: 2025   Encounter department: [facility] PRIMARY CARE  :  Assessment & Plan  Acute sinusitis, recurrence not specified, unspecified location  Add augmentin and inhaler. Discussed supportive care and return parameters.   Orders:    amoxicillin-clavulanate (AUGMENTIN) 875-125 mg per tablet; Take 1 tablet by mouth every 12 (twelve) hours for 7 days    Fluticasone-Salmeterol (Advair Diskus) 250-50 mcg/dose inhaler; Inhale 1 puff 2 (two) times a day Rinse mouth after use.    Acute bronchitis, unspecified organism  Add steroid burst with cough meds. Discussed supportive care and return parameters.   Orders:    predniSONE 20 mg tablet; Take 2 tablets (40 mg total) by mouth daily for 5 days    Fluticasone-Salmeterol (Advair Diskus) 250-50 mcg/dose inhaler; Inhale 1 puff 2 (two) times a day Rinse mouth after use.    dextromethorphan-guaiFENesin (ROBITUSSIN DM)  mg/5 mL syrup; Take 5 mL by mouth 3 (three) times a day as needed for cough    fluticasone (FLONASE) 50 mcg/act nasal spray; 2 sprays into each nostril daily           History of Present Illness   Patient is a 61 y/o male who presents c/o cough congestion sinus pressure wheezing no fevers chills nausea or vomiting.    Sinusitis  Associated symptoms include congestion, coughing and sinus pressure.     Review of Systems   Constitutional: Negative.    HENT:  Positive for congestion and sinus pressure.    Eyes: Negative.    Respiratory:  Positive for cough and wheezing.    Cardiovascular: Negative.    Gastrointestinal: Negative.    Endocrine: Negative.    Genitourinary: Negative.    Musculoskeletal: Negative.    Allergic/Immunologic: Negative.    Neurological: Negative.    Hematological: Negative.    Psychiatric/Behavioral: Negative.     All other systems reviewed and are negative.      Objective   /80 (BP Location: Left  "arm, Patient Position: Sitting, Cuff Size: Large)   Pulse 84   Temp 98.3 °F (36.8 °C) (Temporal)   Ht 5' 10\" (1.778 m)   Wt 106 kg (234 lb)   SpO2 98%   BMI 33.58 kg/m²      Physical Exam  Vitals reviewed.   Constitutional:       General: He is not in acute distress.     Appearance: He is well-developed. He is not diaphoretic.   HENT:      Head: Normocephalic and atraumatic.      Right Ear: External ear normal.      Left Ear: External ear normal.      Nose: Nose normal.     Eyes:      General: No scleral icterus.        Right eye: No discharge.         Left eye: No discharge.      Conjunctiva/sclera: Conjunctivae normal.      Pupils: Pupils are equal, round, and reactive to light.     Neck:      Thyroid: No thyromegaly.      Trachea: No tracheal deviation.     Cardiovascular:      Rate and Rhythm: Normal rate and regular rhythm.      Heart sounds: Normal heart sounds. No murmur heard.     No friction rub.   Pulmonary:      Effort: Pulmonary effort is normal. No respiratory distress.      Breath sounds: No stridor. Wheezing present. No rales.   Abdominal:      General: There is no distension.      Palpations: Abdomen is soft. There is no mass.      Tenderness: There is no abdominal tenderness. There is no guarding or rebound.     Musculoskeletal:         General: Normal range of motion.      Cervical back: Normal range of motion and neck supple.   Lymphadenopathy:      Cervical: No cervical adenopathy.     Skin:     General: Skin is warm.     Neurological:      Mental Status: He is alert and oriented to person, place, and time.      Cranial Nerves: No cranial nerve deficit.     Psychiatric:         Behavior: Behavior normal.         Thought Content: Thought content normal.         Judgment: Judgment normal.         "

## 2025-06-06 ENCOUNTER — OFFICE VISIT (OUTPATIENT)
Age: 60
End: 2025-06-06
Payer: COMMERCIAL

## 2025-06-06 VITALS — BODY MASS INDEX: 33.46 KG/M2 | WEIGHT: 233.69 LBS | HEIGHT: 70 IN

## 2025-06-06 DIAGNOSIS — M53.3 SI (SACROILIAC) JOINT DYSFUNCTION: ICD-10-CM

## 2025-06-06 DIAGNOSIS — G89.29 CHRONIC BILATERAL LOW BACK PAIN WITHOUT SCIATICA: ICD-10-CM

## 2025-06-06 DIAGNOSIS — M47.812 CERVICAL SPONDYLOSIS: ICD-10-CM

## 2025-06-06 DIAGNOSIS — M47.816 SPONDYLOSIS OF LUMBAR SPINE: ICD-10-CM

## 2025-06-06 DIAGNOSIS — M47.816 LUMBAR SPONDYLOSIS: ICD-10-CM

## 2025-06-06 DIAGNOSIS — M54.50 CHRONIC BILATERAL LOW BACK PAIN WITHOUT SCIATICA: ICD-10-CM

## 2025-06-06 DIAGNOSIS — M47.814 THORACIC SPONDYLOSIS: ICD-10-CM

## 2025-06-06 PROCEDURE — 99204 OFFICE O/P NEW MOD 45 MIN: CPT | Performed by: STUDENT IN AN ORGANIZED HEALTH CARE EDUCATION/TRAINING PROGRAM

## 2025-06-06 RX ORDER — CELECOXIB 200 MG/1
200 CAPSULE ORAL 2 TIMES DAILY PRN
Qty: 60 CAPSULE | Refills: 2 | Status: SHIPPED | OUTPATIENT
Start: 2025-06-06

## 2025-06-06 NOTE — PROGRESS NOTES
Assessment:  1. Chronic bilateral low back pain without sciatica    2. Spondylosis of lumbar spine    3. Lumbar spondylosis    4. Thoracic spondylosis    5. Cervical spondylosis    6. SI (sacroiliac) joint dysfunction        Plan:    Dario Tolliver is a 60 y.o. male who presents for follow up office visit in regards to mid back and low back/buttock pain pain c/w thoracic spondylosis, lumbar spondylosis, and SI joint dysfunction. We discussed imaging, rehabilitation, optimization of medications and potential interventions. The following plan was formulated with the patient:    - Celebrex 200mg BID PRN.   - Tylenol 1g Q8H PRN for upto 3g/24hrs  - C/w PT/HEP  - Schedule for bilateral SI joint injections. Complete risks and benefits including bleeding, infection, tissue reaction, nerve injury and allergic reaction were discussed. The approach was demonstrated using models and literature was provided. Verbal and written consent was obtained.  - Follow up in 4-6 weeks    No orders of the defined types were placed in this encounter.      New Medications Ordered This Visit   Medications    celecoxib (CeleBREX) 200 mg capsule     Sig: Take 1 capsule (200 mg total) by mouth 2 (two) times a day as needed for mild pain     Dispense:  60 capsule     Refill:  2       My impressions and treatment recommendations were discussed in detail with the patient, who verbalized understanding and had no further questions.    History of Present Illness:  Dario Tolliver is a 60 y.o. male who presents for a follow up office visit in regards to neck, thoracic and low back pain and stiffness consistent with thoracic and lumbar spondylosis, myofascial pain and SI joint dysfunction. . Since last visit saw Rheumatology . Current pain medications includes:  Celebrex.  The patient reports that this regimen is providing 50% pain relief.      He describes the pain as constant dull/aching of moderate severity. Pain is rated 4/10 and interferes with  daily activities.    Diagnostic studies include Xray cervical, thoracic and lumbar spine with chronic degenerative changes. On lumbar spine noted to have anterior osteophytes.      MRI cervical spine with multilevel right greater than left facet arthopathy and uncovertebral hypertrophy resulting in moderate to severe right greater than left foraminal stenosis.     ENDER thoracic spine with mild degenerative changes of thoracic spine, no significant canal stenosis or foraminal narrowing.     MRI Lumbar spine with multilevel disc bulges with varying degrees of foraminal narrowig, moderate right L4-5.     Pain is causing significant functional limitation resulting in diminished quality of life and impaired age appropriate ADLs.  Denies fever, chills, numbness/tingling, bowel/bladder dysfunction, motor weakness.      I have personally reviewed and/or updated the patient's past medical history, past surgical history, family history, social history, current medications, allergies, and vital signs today.     Review of Systems   Constitutional:  Negative for chills and fever.   HENT:  Negative for ear pain and sore throat.    Eyes:  Negative for pain and visual disturbance.   Respiratory:  Negative for cough and shortness of breath.    Cardiovascular:  Negative for chest pain and palpitations.   Gastrointestinal:  Negative for abdominal pain and vomiting.   Genitourinary:  Negative for dysuria and hematuria.   Musculoskeletal:  Positive for arthralgias, back pain and myalgias.   Skin:  Negative for color change and rash.   Neurological:  Negative for seizures and syncope.   All other systems reviewed and are negative.      Problem List[1]    Past Medical History[2]    Past Surgical History[3]    Family History[4]    Social History     Occupational History    Not on file   Tobacco Use    Smoking status: Former     Current packs/day: 0.00     Average packs/day: 1.5 packs/day for 15.0 years (22.5 ttl pk-yrs)     Types:  Cigarettes     Start date: 1983     Quit date: 1998     Years since quittin.4    Smokeless tobacco: Never   Vaping Use    Vaping status: Never Used   Substance and Sexual Activity    Alcohol use: Yes     Alcohol/week: 15.0 standard drinks of alcohol     Types: 15 Shots of liquor per week     Comment: daily    Drug use: Yes     Frequency: 5.0 times per week     Types: Marijuana    Sexual activity: Not Currently     Partners: Female       Current Outpatient Medications on File Prior to Visit   Medication Sig    albuterol (Ventolin HFA) 90 mcg/act inhaler Inhale 2 puffs every 6 (six) hours as needed for wheezing    amoxicillin-clavulanate (AUGMENTIN) 875-125 mg per tablet Take 1 tablet by mouth every 12 (twelve) hours for 7 days    aspirin (ECOTRIN LOW STRENGTH) 81 mg EC tablet Take 81 mg by mouth in the morning.    Cholecalciferol (VITAMIN D) 2000 units CAPS Take 2,000 Units by mouth in the morning.    CLOTRIMAZOLE EX Apply topically 2 (two) times a day     dextromethorphan-guaiFENesin (ROBITUSSIN DM)  mg/5 mL syrup Take 5 mL by mouth 3 (three) times a day as needed for cough    esomeprazole (NexIUM) 20 mg capsule Take 20 mg by mouth every other day    fexofenadine (ALLEGRA) 180 MG tablet Take 180 mg by mouth in the morning.    fluticasone (FLONASE) 50 mcg/act nasal spray 2 sprays into each nostril daily    Fluticasone-Salmeterol (Advair Diskus) 250-50 mcg/dose inhaler Inhale 1 puff 2 (two) times a day Rinse mouth after use.    psyllium (METAMUCIL) 58.6 % powder Take 1 packet by mouth in the morning and 1 packet in the evening and 1 packet before bedtime.    [DISCONTINUED] celecoxib (CeleBREX) 200 mg capsule Take 1 capsule (200 mg total) by mouth 2 (two) times a day as needed for mild pain    ciclopirox (PENLAC) 8 % solution Apply topically daily at bedtime (Patient not taking: Reported on 2025)    econazole nitrate 1 % cream Apply topically daily (Patient not taking: Reported on 2025)  "    No current facility-administered medications on file prior to visit.       Allergies[5]    Physical Exam:    Ht 5' 10\" (1.778 m)   Wt 106 kg (233 lb 11 oz)   BMI 33.53 kg/m²     Constitutional: normal, well developed, well nourished, alert, in no distress and non-toxic and no overt pain behavior.  HEENT: grossly intact  Neck: supple, symmetric, trachea midline and no masses   Pulmonary:even and unlabored  Cardiovascular:No edema or pitting edema present  Skin:Normal without rashes or lesions and well hydrated  Psychiatric:Mood and affect appropriate  Neurologic:Cranial Nerves II-XII grossly intact    Lumbar Musculoskeletal and Neurologic Exam:    Inspection: no atrophy of the LE musculature    Gait: non-antalgic gait    ROM: limited AROM of the lumbar spine in all planes; FROM at bilateral hips    Sensation: SILT bilateral L2-S1 dermatomes    Strength:       Right Left  Hip Flexion  5 5  Knee Extension  5 5  Ankle Dorsiflexion 5 5  Great Toe Extension 5 5  Ankle Plantar Flexion 5 5      Reflexes: 2+ in bilateral knees and ankles.    Palpation: +TTP bilateral SI Joint. Diffusely tender over lumbar paraspinals    Provocative tests:    Seated Slump negative bilaterally   Copeland's: pos b/l   Sacral Compression/thrust test, GRACIE, and Gaenslen's tests: pos b/l   FADIR: negative bilaterally       Imaging    MRI Thoracic Spine 4/13/25  FINDINGS:     ALIGNMENT: Mild dextroscoliosis of thoracic spine. No listhesis. No compression fracture.     MARROW SIGNAL: T5, T9, and L1 intraosseous vertebral hemangiomas. Otherwise, normal bone marrow signal.     THORACIC CORD: Normal signal within the thoracic cord.     PARAVERTEBRAL SOFT TISSUES:  Normal.     THORACIC DEGENERATIVE CHANGE: Multilevel endplate osteophytes, small intravertebral herniations, and mild disc height loss. No significant canal stenosis or foraminal narrowing.     OTHER FINDINGS:  None.     IMPRESSION:     No acute abnormality of thoracic spine.     Mild " degenerative changes of thoracic spine. No significant canal stenosis or foraminal narrowing.     MRI Lumbar Spine 4/13/25     FINDINGS:     VERTEBRAL BODIES:  There are 5 lumbar type vertebral bodies. Mild levoscoliosis of lumbar spine. No listhesis. No compression fracture.     Type I Modic endplate change at anterior superior corner endplate of L5. Incomplete osseous fusion of the posterior midline elements at L5-S1, normal variant. Otherwise, normal bone marrow signal.     SACRUM: Type I Modic endplate change of anterior superior corner endplate of S1. Otherwise, normal bone marrow signal in visualized sacrum. No evidence of insufficiency or stress fracture. Partially imaged degenerative changes of bilateral sacroiliac   joints with right anterior bridging osteophyte.     DISTAL CORD AND CONUS:  Normal size and signal within the distal cord and conus.     PARASPINAL SOFT TISSUES:  Paraspinal soft tissues are unremarkable.     LOWER THORACIC DISC SPACES: Please see same-day MRI thoracic spine without contrast for further evaluation.     LUMBAR DISC SPACES: Multilevel endplate osteophytes, small intervertebral herniations, mild disc height loss, facet arthropathy.     L1-L2: Disc bulge. No significant canal stenosis or foraminal narrowing.     L2-L3: Disc bulge. Facet arthropathy. No significant canal stenosis or foraminal narrowing.     L3-L4: Disc bulge, right foraminal disc protrusion contacts right L3 exiting nerve. Facet arthropathy. No significant canal stenosis. Mild bilateral foraminal narrowing.     L4-L5: Disc bulge, narrowed bilateral subarticular zones. Facet arthropathy, ligamentum flavum thickening. No significant canal stenosis. Moderate right and mild left foraminal narrowing.     L5-S1: Disc bulge, narrowed right subarticular zone, small posterior annular fissure. Right facet arthropathy. No significant canal stenosis. Mild bilateral foraminal narrowing (right worse than left).     OTHER FINDINGS:  Partially imaged colonic diverticulosis.   IMPRESSION:   Multilevel degenerative changes of lumbar spine with varying degrees of foraminal narrowing (moderate right L4-L5), as detailed above. No significant canal stenosis.        MRI Cervical Spine   ALIGNMENT:  Normal alignment of the cervical spine.  No compression fracture.  No subluxation.  No scoliosis.     MARROW SIGNAL: Mild edema within the right C5 and C6 articular pillars suggesting active facet arthropathy.     CERVICAL AND VISUALIZED THORACIC CORD:  Normal signal within the visualized cord.     PREVERTEBRAL AND PARASPINAL SOFT TISSUES:  Normal.     VISUALIZED POSTERIOR FOSSA:  The visualized posterior fossa demonstrates no abnormal signal. Visualized intracranial vault demonstrates mucosal thickening in the sphenoid sinus.   CERVICAL DISC SPACES:   C2-C3:  Normal.   C3-C4: Right greater than left facet arthropathy with mild right uncovertebral hypertrophy resulting in severe right and moderate left foraminal stenosis. Shallow right central protrusion. No spinal stenosis.   C4-C5: Right greater than left facet arthropathy with bilateral uncovertebral hypertrophy resulting in severe right and moderate left foraminal stenosis. No spinal canal stenosis. Severe right foraminal stenosis.   C5-C6: Right greater than left facet arthropathy with and moderate right greater than left foraminal stenosis. Mild ligamentum flavum thickening. Minimal spinal stenosis.   C6-C7: Posterior disc osteophyte complex with a tiny central disc protrusion with inferior migration. Right greater than left facet arthropathy and uncovertebral hypertrophy without significant foraminal stenosis.   C7-T1:  Normal.   UPPER THORACIC DISC SPACES:  Normal.   IMPRESSION:   Multilevel cervical spondylosis with right greater than left facet arthropathy and uncovertebral hypertrophy resulting in moderate to severe right greater than left foraminal stenosis as described above. Correlate for right  greater than left C4   radiculitis.     No cord signal abnormality.       [1]   Patient Active Problem List  Diagnosis    Cervical pain (neck)    Esophageal reflux    Facial basal cell cancer    Hyperlipidemia    Vitamin D deficiency    Upper respiratory infection    Coronary artery disease involving native coronary artery    Chest pain due to myocardial ischemia    Right lateral epicondylitis    Tinea pedis of both feet    Cervical radiculitis    Essential hypertension    Bronchitis    Chronic bilateral low back pain without sciatica   [2]   Past Medical History:  Diagnosis Date    Allergic     Anxiety     Basal cell carcinoma     Cancer (HCC)     GERD (gastroesophageal reflux disease)     Neck pain on right side    [3]   Past Surgical History:  Procedure Laterality Date    HEMORRHOID SURGERY     [4]   Family History  Problem Relation Name Age of Onset    Depression Mother Gojee     Breast cancer Mother Elisha Tolliver     Cancer Mother Elisha Tolliver     No Known Problems Father      Breast cancer Sister Carmen Tolliver     Cancer Sister Carmen Tolliver    [5]   Allergies  Allergen Reactions    Lamisil Af Defense [Tolnaftate] Hives    Terbinafine Other (See Comments)

## 2025-06-06 NOTE — H&P (VIEW-ONLY)
Assessment:  1. Chronic bilateral low back pain without sciatica    2. Spondylosis of lumbar spine    3. Lumbar spondylosis    4. Thoracic spondylosis    5. Cervical spondylosis    6. SI (sacroiliac) joint dysfunction        Plan:    Dario Tolliver is a 60 y.o. male who presents for follow up office visit in regards to mid back and low back/buttock pain pain c/w thoracic spondylosis, lumbar spondylosis, and SI joint dysfunction. We discussed imaging, rehabilitation, optimization of medications and potential interventions. The following plan was formulated with the patient:    - Celebrex 200mg BID PRN.   - Tylenol 1g Q8H PRN for upto 3g/24hrs  - C/w PT/HEP  - Schedule for bilateral SI joint injections. Complete risks and benefits including bleeding, infection, tissue reaction, nerve injury and allergic reaction were discussed. The approach was demonstrated using models and literature was provided. Verbal and written consent was obtained.  - Follow up in 4-6 weeks    No orders of the defined types were placed in this encounter.      New Medications Ordered This Visit   Medications    celecoxib (CeleBREX) 200 mg capsule     Sig: Take 1 capsule (200 mg total) by mouth 2 (two) times a day as needed for mild pain     Dispense:  60 capsule     Refill:  2       My impressions and treatment recommendations were discussed in detail with the patient, who verbalized understanding and had no further questions.    History of Present Illness:  Dario Tolliver is a 60 y.o. male who presents for a follow up office visit in regards to neck, thoracic and low back pain and stiffness consistent with thoracic and lumbar spondylosis, myofascial pain and SI joint dysfunction. . Since last visit saw Rheumatology . Current pain medications includes:  Celebrex.  The patient reports that this regimen is providing 50% pain relief.      He describes the pain as constant dull/aching of moderate severity. Pain is rated 4/10 and interferes with  daily activities.    Diagnostic studies include Xray cervical, thoracic and lumbar spine with chronic degenerative changes. On lumbar spine noted to have anterior osteophytes.      MRI cervical spine with multilevel right greater than left facet arthopathy and uncovertebral hypertrophy resulting in moderate to severe right greater than left foraminal stenosis.     ENDER thoracic spine with mild degenerative changes of thoracic spine, no significant canal stenosis or foraminal narrowing.     MRI Lumbar spine with multilevel disc bulges with varying degrees of foraminal narrowig, moderate right L4-5.     Pain is causing significant functional limitation resulting in diminished quality of life and impaired age appropriate ADLs.  Denies fever, chills, numbness/tingling, bowel/bladder dysfunction, motor weakness.      I have personally reviewed and/or updated the patient's past medical history, past surgical history, family history, social history, current medications, allergies, and vital signs today.     Review of Systems   Constitutional:  Negative for chills and fever.   HENT:  Negative for ear pain and sore throat.    Eyes:  Negative for pain and visual disturbance.   Respiratory:  Negative for cough and shortness of breath.    Cardiovascular:  Negative for chest pain and palpitations.   Gastrointestinal:  Negative for abdominal pain and vomiting.   Genitourinary:  Negative for dysuria and hematuria.   Musculoskeletal:  Positive for arthralgias, back pain and myalgias.   Skin:  Negative for color change and rash.   Neurological:  Negative for seizures and syncope.   All other systems reviewed and are negative.      Problem List[1]    Past Medical History[2]    Past Surgical History[3]    Family History[4]    Social History     Occupational History    Not on file   Tobacco Use    Smoking status: Former     Current packs/day: 0.00     Average packs/day: 1.5 packs/day for 15.0 years (22.5 ttl pk-yrs)     Types:  Cigarettes     Start date: 1983     Quit date: 1998     Years since quittin.4    Smokeless tobacco: Never   Vaping Use    Vaping status: Never Used   Substance and Sexual Activity    Alcohol use: Yes     Alcohol/week: 15.0 standard drinks of alcohol     Types: 15 Shots of liquor per week     Comment: daily    Drug use: Yes     Frequency: 5.0 times per week     Types: Marijuana    Sexual activity: Not Currently     Partners: Female       Current Outpatient Medications on File Prior to Visit   Medication Sig    albuterol (Ventolin HFA) 90 mcg/act inhaler Inhale 2 puffs every 6 (six) hours as needed for wheezing    amoxicillin-clavulanate (AUGMENTIN) 875-125 mg per tablet Take 1 tablet by mouth every 12 (twelve) hours for 7 days    aspirin (ECOTRIN LOW STRENGTH) 81 mg EC tablet Take 81 mg by mouth in the morning.    Cholecalciferol (VITAMIN D) 2000 units CAPS Take 2,000 Units by mouth in the morning.    CLOTRIMAZOLE EX Apply topically 2 (two) times a day     dextromethorphan-guaiFENesin (ROBITUSSIN DM)  mg/5 mL syrup Take 5 mL by mouth 3 (three) times a day as needed for cough    esomeprazole (NexIUM) 20 mg capsule Take 20 mg by mouth every other day    fexofenadine (ALLEGRA) 180 MG tablet Take 180 mg by mouth in the morning.    fluticasone (FLONASE) 50 mcg/act nasal spray 2 sprays into each nostril daily    Fluticasone-Salmeterol (Advair Diskus) 250-50 mcg/dose inhaler Inhale 1 puff 2 (two) times a day Rinse mouth after use.    psyllium (METAMUCIL) 58.6 % powder Take 1 packet by mouth in the morning and 1 packet in the evening and 1 packet before bedtime.    [DISCONTINUED] celecoxib (CeleBREX) 200 mg capsule Take 1 capsule (200 mg total) by mouth 2 (two) times a day as needed for mild pain    ciclopirox (PENLAC) 8 % solution Apply topically daily at bedtime (Patient not taking: Reported on 2025)    econazole nitrate 1 % cream Apply topically daily (Patient not taking: Reported on 2025)  "    No current facility-administered medications on file prior to visit.       Allergies[5]    Physical Exam:    Ht 5' 10\" (1.778 m)   Wt 106 kg (233 lb 11 oz)   BMI 33.53 kg/m²     Constitutional: normal, well developed, well nourished, alert, in no distress and non-toxic and no overt pain behavior.  HEENT: grossly intact  Neck: supple, symmetric, trachea midline and no masses   Pulmonary:even and unlabored  Cardiovascular:No edema or pitting edema present  Skin:Normal without rashes or lesions and well hydrated  Psychiatric:Mood and affect appropriate  Neurologic:Cranial Nerves II-XII grossly intact    Lumbar Musculoskeletal and Neurologic Exam:    Inspection: no atrophy of the LE musculature    Gait: non-antalgic gait    ROM: limited AROM of the lumbar spine in all planes; FROM at bilateral hips    Sensation: SILT bilateral L2-S1 dermatomes    Strength:       Right Left  Hip Flexion  5 5  Knee Extension  5 5  Ankle Dorsiflexion 5 5  Great Toe Extension 5 5  Ankle Plantar Flexion 5 5      Reflexes: 2+ in bilateral knees and ankles.    Palpation: +TTP bilateral SI Joint. Diffusely tender over lumbar paraspinals    Provocative tests:    Seated Slump negative bilaterally   Copeland's: pos b/l   Sacral Compression/thrust test, GRACIE, and Gaenslen's tests: pos b/l   FADIR: negative bilaterally       Imaging    MRI Thoracic Spine 4/13/25  FINDINGS:     ALIGNMENT: Mild dextroscoliosis of thoracic spine. No listhesis. No compression fracture.     MARROW SIGNAL: T5, T9, and L1 intraosseous vertebral hemangiomas. Otherwise, normal bone marrow signal.     THORACIC CORD: Normal signal within the thoracic cord.     PARAVERTEBRAL SOFT TISSUES:  Normal.     THORACIC DEGENERATIVE CHANGE: Multilevel endplate osteophytes, small intravertebral herniations, and mild disc height loss. No significant canal stenosis or foraminal narrowing.     OTHER FINDINGS:  None.     IMPRESSION:     No acute abnormality of thoracic spine.     Mild " degenerative changes of thoracic spine. No significant canal stenosis or foraminal narrowing.     MRI Lumbar Spine 4/13/25     FINDINGS:     VERTEBRAL BODIES:  There are 5 lumbar type vertebral bodies. Mild levoscoliosis of lumbar spine. No listhesis. No compression fracture.     Type I Modic endplate change at anterior superior corner endplate of L5. Incomplete osseous fusion of the posterior midline elements at L5-S1, normal variant. Otherwise, normal bone marrow signal.     SACRUM: Type I Modic endplate change of anterior superior corner endplate of S1. Otherwise, normal bone marrow signal in visualized sacrum. No evidence of insufficiency or stress fracture. Partially imaged degenerative changes of bilateral sacroiliac   joints with right anterior bridging osteophyte.     DISTAL CORD AND CONUS:  Normal size and signal within the distal cord and conus.     PARASPINAL SOFT TISSUES:  Paraspinal soft tissues are unremarkable.     LOWER THORACIC DISC SPACES: Please see same-day MRI thoracic spine without contrast for further evaluation.     LUMBAR DISC SPACES: Multilevel endplate osteophytes, small intervertebral herniations, mild disc height loss, facet arthropathy.     L1-L2: Disc bulge. No significant canal stenosis or foraminal narrowing.     L2-L3: Disc bulge. Facet arthropathy. No significant canal stenosis or foraminal narrowing.     L3-L4: Disc bulge, right foraminal disc protrusion contacts right L3 exiting nerve. Facet arthropathy. No significant canal stenosis. Mild bilateral foraminal narrowing.     L4-L5: Disc bulge, narrowed bilateral subarticular zones. Facet arthropathy, ligamentum flavum thickening. No significant canal stenosis. Moderate right and mild left foraminal narrowing.     L5-S1: Disc bulge, narrowed right subarticular zone, small posterior annular fissure. Right facet arthropathy. No significant canal stenosis. Mild bilateral foraminal narrowing (right worse than left).     OTHER FINDINGS:  Partially imaged colonic diverticulosis.   IMPRESSION:   Multilevel degenerative changes of lumbar spine with varying degrees of foraminal narrowing (moderate right L4-L5), as detailed above. No significant canal stenosis.        MRI Cervical Spine   ALIGNMENT:  Normal alignment of the cervical spine.  No compression fracture.  No subluxation.  No scoliosis.     MARROW SIGNAL: Mild edema within the right C5 and C6 articular pillars suggesting active facet arthropathy.     CERVICAL AND VISUALIZED THORACIC CORD:  Normal signal within the visualized cord.     PREVERTEBRAL AND PARASPINAL SOFT TISSUES:  Normal.     VISUALIZED POSTERIOR FOSSA:  The visualized posterior fossa demonstrates no abnormal signal. Visualized intracranial vault demonstrates mucosal thickening in the sphenoid sinus.   CERVICAL DISC SPACES:   C2-C3:  Normal.   C3-C4: Right greater than left facet arthropathy with mild right uncovertebral hypertrophy resulting in severe right and moderate left foraminal stenosis. Shallow right central protrusion. No spinal stenosis.   C4-C5: Right greater than left facet arthropathy with bilateral uncovertebral hypertrophy resulting in severe right and moderate left foraminal stenosis. No spinal canal stenosis. Severe right foraminal stenosis.   C5-C6: Right greater than left facet arthropathy with and moderate right greater than left foraminal stenosis. Mild ligamentum flavum thickening. Minimal spinal stenosis.   C6-C7: Posterior disc osteophyte complex with a tiny central disc protrusion with inferior migration. Right greater than left facet arthropathy and uncovertebral hypertrophy without significant foraminal stenosis.   C7-T1:  Normal.   UPPER THORACIC DISC SPACES:  Normal.   IMPRESSION:   Multilevel cervical spondylosis with right greater than left facet arthropathy and uncovertebral hypertrophy resulting in moderate to severe right greater than left foraminal stenosis as described above. Correlate for right  greater than left C4   radiculitis.     No cord signal abnormality.       [1]   Patient Active Problem List  Diagnosis    Cervical pain (neck)    Esophageal reflux    Facial basal cell cancer    Hyperlipidemia    Vitamin D deficiency    Upper respiratory infection    Coronary artery disease involving native coronary artery    Chest pain due to myocardial ischemia    Right lateral epicondylitis    Tinea pedis of both feet    Cervical radiculitis    Essential hypertension    Bronchitis    Chronic bilateral low back pain without sciatica   [2]   Past Medical History:  Diagnosis Date    Allergic     Anxiety     Basal cell carcinoma     Cancer (HCC)     GERD (gastroesophageal reflux disease)     Neck pain on right side    [3]   Past Surgical History:  Procedure Laterality Date    HEMORRHOID SURGERY     [4]   Family History  Problem Relation Name Age of Onset    Depression Mother [employer]     Breast cancer Mother Elisha Tolliver     Cancer Mother Elisha Tolliver     No Known Problems Father      Breast cancer Sister Carmen Tolliver     Cancer Sister Carmen Tolliver    [5]   Allergies  Allergen Reactions    Lamisil Af Defense [Tolnaftate] Hives    Terbinafine Other (See Comments)

## 2025-06-17 ENCOUNTER — HOSPITAL ENCOUNTER (OUTPATIENT)
Facility: HOSPITAL | Age: 60
Setting detail: OUTPATIENT SURGERY
Discharge: HOME | End: 2025-06-17
Attending: STUDENT IN AN ORGANIZED HEALTH CARE EDUCATION/TRAINING PROGRAM | Admitting: STUDENT IN AN ORGANIZED HEALTH CARE EDUCATION/TRAINING PROGRAM
Payer: COMMERCIAL

## 2025-06-17 ENCOUNTER — APPOINTMENT (OUTPATIENT)
Dept: RADIOLOGY | Facility: HOSPITAL | Age: 60
End: 2025-06-17
Payer: COMMERCIAL

## 2025-06-17 VITALS
WEIGHT: 233 LBS | HEART RATE: 60 BPM | HEIGHT: 70 IN | DIASTOLIC BLOOD PRESSURE: 88 MMHG | OXYGEN SATURATION: 99 % | BODY MASS INDEX: 33.36 KG/M2 | SYSTOLIC BLOOD PRESSURE: 134 MMHG | TEMPERATURE: 97.2 F | RESPIRATION RATE: 15 BRPM

## 2025-06-17 PROCEDURE — 27096 INJECT SACROILIAC JOINT: CPT | Performed by: STUDENT IN AN ORGANIZED HEALTH CARE EDUCATION/TRAINING PROGRAM

## 2025-06-17 RX ORDER — ROPIVACAINE HYDROCHLORIDE 2 MG/ML
INJECTION, SOLUTION EPIDURAL; INFILTRATION; PERINEURAL AS NEEDED
Status: DISCONTINUED | OUTPATIENT
Start: 2025-06-17 | End: 2025-06-17 | Stop reason: HOSPADM

## 2025-06-17 RX ORDER — METHYLPREDNISOLONE ACETATE 40 MG/ML
INJECTION, SUSPENSION INTRA-ARTICULAR; INTRALESIONAL; INTRAMUSCULAR; SOFT TISSUE AS NEEDED
Status: DISCONTINUED | OUTPATIENT
Start: 2025-06-17 | End: 2025-06-17 | Stop reason: HOSPADM

## 2025-06-17 RX ORDER — INDOMETHACIN 25 MG/1
CAPSULE ORAL AS NEEDED
Status: DISCONTINUED | OUTPATIENT
Start: 2025-06-17 | End: 2025-06-17 | Stop reason: HOSPADM

## 2025-06-17 NOTE — INTERVAL H&P NOTE
H&P reviewed. After examining the patient I find no changes in the patients condition since the H&P had been written.    Vitals:    06/17/25 0713   BP: 167/93   Pulse: 72   Resp: 16   Temp: (!) 97.2 °F (36.2 °C)   SpO2: 97%

## 2025-06-17 NOTE — DISCHARGE INSTR - AVS FIRST PAGE

## 2025-06-17 NOTE — OP NOTE
OPERATIVE REPORT  PATIENT NAME: Dario Tolliver    :  1965  MRN: [id number]  Pt Location:  OR ROOM 13    SURGERY DATE: 2025    Surgeons and Role:     * Lizeth Monique MD - Primary    Preop Diagnosis:  SI (sacroiliac) joint dysfunction [M53.3]    Post-Op Diagnosis Codes:     * SI (sacroiliac) joint dysfunction [M53.3]    Procedure(s):  Bilateral - Bilateral SI joint injections    Indication: Low back/buttock pain  Preoperative Diagnosis: 1. Sacroiliac Joint Pain  2. Sacroiliitis  Postoperative Diagnosis: 1. Sacroiliac Joint Pain  2. Sacroiliitis    Procedure: Fluoroscopically-guided injection of the bilateral sacroiliac joints  EBL: none  Specimens: not applicable    After discussing the risks, benefits, and alternatives to the procedure, the patient expressed understanding and wished to proceed. The patient was brought to the fluoroscopy suite and placed in the prone position. Procedural pause conducted to verify: correct patient identity, procedure to be performed and as applicable, correct side and site, correct patient position, and availability of implants, special equipment and special requirements. Using fluoroscopy, the inferior portion of the sacroiliac joint was identified. The skin was sterilely prepped and draped in the usual fashion using Chloraprep skin prep. Using fluoroscopic guidance, a 3.5 inch 22 gauge spinal needle was advanced into the sacroiliac joint. Proper needle positioning was confirmed using multiple fluoroscopic views. After negative aspiration, Omnipaque 300 contrast was injected, showing intraarticular spread of contrast without any evidence of intravascular uptake. A 2.5 mL solution consisting of 40 mg of Depo-Medrol in 0.2% ropivacaine was injected slowly and incrementally into the joint. Following the injection, the needle was withdrawn.     The procedure was repeated in the exact same way on the opposite side.     The patient tolerated the procedure well and there  were no apparent complications. After appropriate observation, the patient was dismissed from the clinic in good condition under their own power.       SIGNATURE: Lizeth Monique MD  DATE: June 17, 2025  TIME: 8:35 AM

## 2025-07-01 ENCOUNTER — TELEPHONE (OUTPATIENT)
Dept: PAIN MEDICINE | Facility: CLINIC | Age: 60
End: 2025-07-01

## 2025-07-01 DIAGNOSIS — M54.50 CHRONIC BILATERAL LOW BACK PAIN WITHOUT SCIATICA: Primary | ICD-10-CM

## 2025-07-01 DIAGNOSIS — G89.29 CHRONIC BILATERAL LOW BACK PAIN WITHOUT SCIATICA: Primary | ICD-10-CM

## 2025-07-03 NOTE — TELEPHONE ENCOUNTER
2nd attempt  Lm to cb with % improvement and pain level.      For information on Fall & Injury Prevention, visit [email or web address]

## 2025-07-11 RX ORDER — CYCLOBENZAPRINE HCL 5 MG
5 TABLET ORAL 3 TIMES DAILY PRN
Qty: 90 TABLET | Refills: 0 | Status: SHIPPED | OUTPATIENT
Start: 2025-07-11

## 2025-07-11 RX ORDER — METHYLPREDNISOLONE 4 MG/1
TABLET ORAL
Qty: 1 TABLET | Refills: 0 | Status: SHIPPED | OUTPATIENT
Start: 2025-07-11

## 2025-07-11 NOTE — TELEPHONE ENCOUNTER
Caller: Brayden GATICA  Doctor/office: Dr Monique  CB#: [phone]    % of improvement:25%$  Pain Scale (1-10): 10/10

## 2025-07-11 NOTE — TELEPHONE ENCOUNTER
RN s/w pt regarding previous, pt was sent Flexeril and steroid dose landon to [facility] aware no celebrex or other NSAIDS while on steroid dose landon. Aware may take the flexeril and tylenol up to 1000mg TID along with Steroid.  Also may use heat. Ice , rest and elevation and may seek help in ER if needed prior to seeing CLAY on Monday  Pt aware may start dose landon today will have to condence first days dose and then continue tomorrow at times written on package instructions.    Pt and pt's wife appreciative of same.

## 2025-07-11 NOTE — TELEPHONE ENCOUNTER
Caller: salud Bishop     Doctor: Dr. Monique     Reason for call: pt calling for an update on previous message. Pt stated that he is in a lot of pain,  pt is having a hard time walking the pain is a 10. Pt has upcoming appt on Monday, pt would like to know if something can be prescribed to help him get through the weekend. Please Advise.     Call back#: [phone]

## 2025-07-14 ENCOUNTER — OFFICE VISIT (OUTPATIENT)
Age: 60
End: 2025-07-14
Payer: COMMERCIAL

## 2025-07-14 VITALS — WEIGHT: 233.69 LBS | BODY MASS INDEX: 33.46 KG/M2 | HEIGHT: 70 IN

## 2025-07-14 DIAGNOSIS — M79.18 MYOFASCIAL PAIN SYNDROME: Primary | ICD-10-CM

## 2025-07-14 DIAGNOSIS — M47.816 LUMBAR SPONDYLOSIS: ICD-10-CM

## 2025-07-14 PROCEDURE — 99214 OFFICE O/P EST MOD 30 MIN: CPT | Performed by: STUDENT IN AN ORGANIZED HEALTH CARE EDUCATION/TRAINING PROGRAM

## 2025-07-14 RX ORDER — METHOCARBAMOL 500 MG/1
500 TABLET, FILM COATED ORAL 3 TIMES DAILY
Qty: 90 TABLET | Refills: 0 | Status: SHIPPED | OUTPATIENT
Start: 2025-07-14

## 2025-07-14 NOTE — H&P (VIEW-ONLY)
Assessment & Plan  Myofascial pain syndrome  Lumbar spondylosis  Orders:    methocarbamol (ROBAXIN) 500 mg tablet; Take 1 tablet (500 mg total) by mouth 3 (three) times a day    Case request operating room: right L3-5 medial branch blocks; Standing    Plan:  Dario Tolliver is a 60 y.o. male who presents for follow up office visit in regards to low back pain on right c/w lumbar spondylosis. We discussed imaging, rehabilitation, optimization of medications and potential interventions. The following plan was formulated with the patient:    - Schedule for right L3-5 medial branch blocks. Complete risks and benefits including bleeding, infection, tissue reaction, nerve injury and allergic reaction were discussed. The approach was demonstrated using models and literature was provided. Verbal and written consent was obtained.  - Complete medrol dose pack. Do not take with NSAIDS  - Restart Celebrex 200mg BID after medrol dose pack  - Robaxin 500mg TID PRN given as flexeril was too sedating. Advised not to take flexeril if taking Robaxin. Counseled not to take medication while driving or operating heavy machinery  - Follow up in for lumbar medial branch blocks     The patient has been experiencing moderate to severe axial spine pain that is causing functional deficit.  The pain has been present for at least 3 months and is not improving with conservative care including one or more of the following: home exercise regimen, physician directed home exercise program, physical therapy, or chiropractic care.  Currently the patient is not experiencing any radicular features nor neurogenic claudication.  Non-facet pathology has been ruled out on clinical evaluation.      We will schedule the patient for right L3-5 medial branch nerve blocks with intention of moving forward towards radiofrequency ablation if there is an appropriate diagnostic response. The initial blocks will be performed with 2% lidocaine and if an appropriate  response is obtained upon review of the patient's pain diary, a confirmatory block will be scheduled with 0.5% bupivacaine at least 2 weeks after the initial block.     In the office today, we reviewed the nature of facet joint pathology in depth using a spine model. We discussed the approach we would use for the injections and provided literature for home review. The patient understands the risks associated with the procedure including bleeding, infection, tissue injury, and allergic reaction and provided verbal informed consent in the office today.      No orders of the defined types were placed in this encounter.      New Medications Ordered This Visit   Medications    methocarbamol (ROBAXIN) 500 mg tablet     Sig: Take 1 tablet (500 mg total) by mouth 3 (three) times a day     Dispense:  90 tablet     Refill:  0       My impressions and treatment recommendations were discussed in detail with the patient, who verbalized understanding and had no further questions.    History of Present Illness:  Dario Tolliver is a 60 y.o. male who presents for a follow up office visit in regards to low back pain R>L. Since last visit underwent SI joint injection with some relief. Had a bad pain flare that worsened Friday for which he took medrol dose pack and flexeril which provided moderate relief.    he describes the pain as constant sharp of moderate severity. Pain is rated 7/10 and interferes with daily activities.    Diagnostic studies include Xray cervical, thoracic and lumbar spine with chronic degenerative changes. On lumbar spine noted to have anterior osteophytes.      MRI cervical spine with multilevel right greater than left facet arthopathy and uncovertebral hypertrophy resulting in moderate to severe right greater than left foraminal stenosis.     ENDER thoracic spine with mild degenerative changes of thoracic spine, no significant canal stenosis or foraminal narrowing.     MRI Lumbar spine with multilevel disc bulges  with varying degrees of foraminal narrowig, moderate right L4-5.     Pain is causing significant functional limitation resulting in diminished quality of life and impaired age appropriate ADLs.  Denies fever, chills, numbness/tingling, bowel/bladder dysfunction, motor weakness.    Procedural Hx:  - B/l SI Joint injections - 25    I have personally reviewed and/or updated the patient's past medical history, past surgical history, family history, social history, current medications, allergies, and vital signs today.     Review of Systems   Constitutional:  Negative for chills and fever.   HENT:  Negative for ear pain and sore throat.    Eyes:  Negative for pain and visual disturbance.   Respiratory:  Negative for cough and shortness of breath.    Cardiovascular:  Negative for chest pain and palpitations.   Gastrointestinal:  Negative for abdominal pain and vomiting.   Genitourinary:  Negative for dysuria and hematuria.   Musculoskeletal:  Positive for arthralgias, back pain, gait problem and myalgias.   Skin:  Negative for color change and rash.   Neurological:  Negative for seizures and syncope.   All other systems reviewed and are negative.      Problem List[1]    Past Medical History[2]    Past Surgical History[3]    Family History[4]    Social History     Occupational History    Not on file   Tobacco Use    Smoking status: Former     Current packs/day: 0.00     Average packs/day: 1.5 packs/day for 15.0 years (22.5 ttl pk-yrs)     Types: Cigarettes     Start date: 1983     Quit date: 1998     Years since quittin.5    Smokeless tobacco: Never   Vaping Use    Vaping status: Never Used   Substance and Sexual Activity    Alcohol use: Not Currently     Comment: a couple of drinks daily    Drug use: Yes     Frequency: 5.0 times per week     Types: Marijuana    Sexual activity: Not Currently     Partners: Female       Current Outpatient Medications on File Prior to Visit   Medication Sig    albuterol  "(Ventolin HFA) 90 mcg/act inhaler Inhale 2 puffs every 6 (six) hours as needed for wheezing    aspirin (ECOTRIN LOW STRENGTH) 81 mg EC tablet Take 81 mg by mouth in the morning.    celecoxib (CeleBREX) 200 mg capsule Take 1 capsule (200 mg total) by mouth 2 (two) times a day as needed for mild pain    Cholecalciferol (VITAMIN D) 2000 units CAPS Take 2,000 Units by mouth in the morning.    CLOTRIMAZOLE EX Apply topically in the morning and in the evening.    cyclobenzaprine (FLEXERIL) 5 mg tablet Take 1 tablet (5 mg total) by mouth 3 (three) times a day as needed for muscle spasms    dextromethorphan-guaiFENesin (ROBITUSSIN DM)  mg/5 mL syrup Take 5 mL by mouth 3 (three) times a day as needed for cough    esomeprazole (NexIUM) 20 mg capsule Take 20 mg by mouth every other day    fexofenadine (ALLEGRA) 180 MG tablet Take 180 mg by mouth in the morning.    fluticasone (FLONASE) 50 mcg/act nasal spray 2 sprays into each nostril daily    Fluticasone-Salmeterol (Advair Diskus) 250-50 mcg/dose inhaler Inhale 1 puff 2 (two) times a day Rinse mouth after use.    methylPREDNISolone 4 MG tablet therapy pack Use as directed on package    psyllium (METAMUCIL) 58.6 % powder Take 1 packet by mouth in the morning and 1 packet in the evening and 1 packet before bedtime.    ciclopirox (PENLAC) 8 % solution Apply topically daily at bedtime (Patient not taking: Reported on 7/14/2025)    econazole nitrate 1 % cream Apply topically daily (Patient not taking: Reported on 5/1/2025)     No current facility-administered medications on file prior to visit.       Allergies[5]    Physical Exam:    Ht 5' 10\" (1.778 m)   Wt 106 kg (233 lb 11 oz)   BMI 33.53 kg/m²     Constitutional: normal, well developed, well nourished, alert, in no distress and non-toxic and no overt pain behavior.  HEENT: grossly intact  Neck: supple, symmetric, trachea midline and no masses   Pulmonary:even and unlabored  Cardiovascular:No edema or pitting edema " present  Skin:Normal without rashes or lesions and well hydrated  Psychiatric:Mood and affect appropriate  Neurologic:Cranial Nerves II-XII grossly intact      Lumbar Musculoskeletal and Neurologic Exam:                         Inspection: no atrophy of the LE musculature                      Gait: non-antalgic gait                   ROM: limited AROM of the lumbar spine in all planes; FROM at bilateral hips                       Sensation: SILT bilateral L2-S1 dermatomes                      Strength:                                                                  Right     Left  Hip Flexion                     5           5  Knee Extension              5           5  Ankle Dorsiflexion 5 5  Great Toe Extension 5 5  Ankle Plantar Flexion 5 5                              Reflexes: 2+ in bilateral knees and ankles.                          Palpation: +TTP bilateral SI Joint. Diffusely tender over lumbar paraspinals                          Provocative tests:                          Seated Slump negative bilaterally   Copeland's: pos b/l    Sacral Compression/thrust test, GRACIE, and Gaenslen's tests: pos b/l        FADIR: negative bilaterally                            Imaging    MRI Thoracic Spine 4/13/25  FINDINGS:     ALIGNMENT: Mild dextroscoliosis of thoracic spine. No listhesis. No compression fracture.     MARROW SIGNAL: T5, T9, and L1 intraosseous vertebral hemangiomas. Otherwise, normal bone marrow signal.     THORACIC CORD: Normal signal within the thoracic cord.     PARAVERTEBRAL SOFT TISSUES:  Normal.     THORACIC DEGENERATIVE CHANGE: Multilevel endplate osteophytes, small intravertebral herniations, and mild disc height loss. No significant canal stenosis or foraminal narrowing.     OTHER FINDINGS:  None.     IMPRESSION:     No acute abnormality of thoracic spine.     Mild degenerative changes of thoracic spine. No significant canal stenosis or foraminal narrowing.     MRI Lumbar Spine 4/13/25      FINDINGS:     VERTEBRAL BODIES:  There are 5 lumbar type vertebral bodies. Mild levoscoliosis of lumbar spine. No listhesis. No compression fracture.     Type I Modic endplate change at anterior superior corner endplate of L5. Incomplete osseous fusion of the posterior midline elements at L5-S1, normal variant. Otherwise, normal bone marrow signal.     SACRUM: Type I Modic endplate change of anterior superior corner endplate of S1. Otherwise, normal bone marrow signal in visualized sacrum. No evidence of insufficiency or stress fracture. Partially imaged degenerative changes of bilateral sacroiliac   joints with right anterior bridging osteophyte.     DISTAL CORD AND CONUS:  Normal size and signal within the distal cord and conus.     PARASPINAL SOFT TISSUES:  Paraspinal soft tissues are unremarkable.     LOWER THORACIC DISC SPACES: Please see same-day MRI thoracic spine without contrast for further evaluation.     LUMBAR DISC SPACES: Multilevel endplate osteophytes, small intervertebral herniations, mild disc height loss, facet arthropathy.     L1-L2: Disc bulge. No significant canal stenosis or foraminal narrowing.     L2-L3: Disc bulge. Facet arthropathy. No significant canal stenosis or foraminal narrowing.     L3-L4: Disc bulge, right foraminal disc protrusion contacts right L3 exiting nerve. Facet arthropathy. No significant canal stenosis. Mild bilateral foraminal narrowing.     L4-L5: Disc bulge, narrowed bilateral subarticular zones. Facet arthropathy, ligamentum flavum thickening. No significant canal stenosis. Moderate right and mild left foraminal narrowing.     L5-S1: Disc bulge, narrowed right subarticular zone, small posterior annular fissure. Right facet arthropathy. No significant canal stenosis. Mild bilateral foraminal narrowing (right worse than left).     OTHER FINDINGS: Partially imaged colonic diverticulosis.   IMPRESSION:   Multilevel degenerative changes of lumbar spine with varying  degrees of foraminal narrowing (moderate right L4-L5), as detailed above. No significant canal stenosis.        MRI Cervical Spine   ALIGNMENT:  Normal alignment of the cervical spine.  No compression fracture.  No subluxation.  No scoliosis.     MARROW SIGNAL: Mild edema within the right C5 and C6 articular pillars suggesting active facet arthropathy.     CERVICAL AND VISUALIZED THORACIC CORD:  Normal signal within the visualized cord.     PREVERTEBRAL AND PARASPINAL SOFT TISSUES:  Normal.     VISUALIZED POSTERIOR FOSSA:  The visualized posterior fossa demonstrates no abnormal signal. Visualized intracranial vault demonstrates mucosal thickening in the sphenoid sinus.   CERVICAL DISC SPACES:   C2-C3:  Normal.   C3-C4: Right greater than left facet arthropathy with mild right uncovertebral hypertrophy resulting in severe right and moderate left foraminal stenosis. Shallow right central protrusion. No spinal stenosis.   C4-C5: Right greater than left facet arthropathy with bilateral uncovertebral hypertrophy resulting in severe right and moderate left foraminal stenosis. No spinal canal stenosis. Severe right foraminal stenosis.   C5-C6: Right greater than left facet arthropathy with and moderate right greater than left foraminal stenosis. Mild ligamentum flavum thickening. Minimal spinal stenosis.   C6-C7: Posterior disc osteophyte complex with a tiny central disc protrusion with inferior migration. Right greater than left facet arthropathy and uncovertebral hypertrophy without significant foraminal stenosis.   C7-T1:  Normal.   UPPER THORACIC DISC SPACES:  Normal.   IMPRESSION:   Multilevel cervical spondylosis with right greater than left facet arthropathy and uncovertebral hypertrophy resulting in moderate to severe right greater than left foraminal stenosis as described above. Correlate for right greater than left C4   radiculitis.     No cord signal abnormality.         [1]   Patient Active Problem  List  Diagnosis    Cervical pain (neck)    Esophageal reflux    Facial basal cell cancer    Hyperlipidemia    Vitamin D deficiency    Upper respiratory infection    Coronary artery disease involving native coronary artery    Chest pain due to myocardial ischemia    Right lateral epicondylitis    Tinea pedis of both feet    Cervical radiculitis    Essential hypertension    Bronchitis    Chronic bilateral low back pain without sciatica   [2]   Past Medical History:  Diagnosis Date    Allergic     Anxiety     Basal cell carcinoma     Cancer (HCC)     GERD (gastroesophageal reflux disease)     Neck pain on right side    [3]   Past Surgical History:  Procedure Laterality Date    HEMORRHOID SURGERY      SC INJECT SI JOINT ARTHRGRPHY&/ANES/STEROID W/LEEANN Bilateral 6/17/2025    Procedure: Bilateral SI joint injections;  Surgeon: Lizeth Monique MD;  Location:  MAIN OR;  Service: Pain Management    [4]   Family History  Problem Relation Name Age of Onset    Depression Mother Elisha Tolliver     Breast cancer Mother Elisha Tolliver     Cancer Mother Elisha Tolliver     No Known Problems Father      Breast cancer Sister Carmen Tolliver     Cancer Sister Carmen Tolliver    [5]   Allergies  Allergen Reactions    Lamisil Af Defense [Tolnaftate] Hives    Terbinafine Other (See Comments)

## 2025-07-14 NOTE — PROGRESS NOTES
Assessment & Plan  Myofascial pain syndrome  Lumbar spondylosis  Orders:    methocarbamol (ROBAXIN) 500 mg tablet; Take 1 tablet (500 mg total) by mouth 3 (three) times a day    Case request operating room: right L3-5 medial branch blocks; Standing    Plan:  Dario Tolliver is a 60 y.o. male who presents for follow up office visit in regards to low back pain on right c/w lumbar spondylosis. We discussed imaging, rehabilitation, optimization of medications and potential interventions. The following plan was formulated with the patient:    - Schedule for right L3-5 medial branch blocks. Complete risks and benefits including bleeding, infection, tissue reaction, nerve injury and allergic reaction were discussed. The approach was demonstrated using models and literature was provided. Verbal and written consent was obtained.  - Complete medrol dose pack. Do not take with NSAIDS  - Restart Celebrex 200mg BID after medrol dose pack  - Robaxin 500mg TID PRN given as flexeril was too sedating. Advised not to take flexeril if taking Robaxin. Counseled not to take medication while driving or operating heavy machinery  - Follow up in for lumbar medial branch blocks     The patient has been experiencing moderate to severe axial spine pain that is causing functional deficit.  The pain has been present for at least 3 months and is not improving with conservative care including one or more of the following: home exercise regimen, physician directed home exercise program, physical therapy, or chiropractic care.  Currently the patient is not experiencing any radicular features nor neurogenic claudication.  Non-facet pathology has been ruled out on clinical evaluation.      We will schedule the patient for right L3-5 medial branch nerve blocks with intention of moving forward towards radiofrequency ablation if there is an appropriate diagnostic response. The initial blocks will be performed with 2% lidocaine and if an appropriate  response is obtained upon review of the patient's pain diary, a confirmatory block will be scheduled with 0.5% bupivacaine at least 2 weeks after the initial block.     In the office today, we reviewed the nature of facet joint pathology in depth using a spine model. We discussed the approach we would use for the injections and provided literature for home review. The patient understands the risks associated with the procedure including bleeding, infection, tissue injury, and allergic reaction and provided verbal informed consent in the office today.      No orders of the defined types were placed in this encounter.      New Medications Ordered This Visit   Medications    methocarbamol (ROBAXIN) 500 mg tablet     Sig: Take 1 tablet (500 mg total) by mouth 3 (three) times a day     Dispense:  90 tablet     Refill:  0       My impressions and treatment recommendations were discussed in detail with the patient, who verbalized understanding and had no further questions.    History of Present Illness:  Dario Tolliver is a 60 y.o. male who presents for a follow up office visit in regards to low back pain R>L. Since last visit underwent SI joint injection with some relief. Had a bad pain flare that worsened Friday for which he took medrol dose pack and flexeril which provided moderate relief.    he describes the pain as constant sharp of moderate severity. Pain is rated 7/10 and interferes with daily activities.    Diagnostic studies include Xray cervical, thoracic and lumbar spine with chronic degenerative changes. On lumbar spine noted to have anterior osteophytes.      MRI cervical spine with multilevel right greater than left facet arthopathy and uncovertebral hypertrophy resulting in moderate to severe right greater than left foraminal stenosis.     ENDER thoracic spine with mild degenerative changes of thoracic spine, no significant canal stenosis or foraminal narrowing.     MRI Lumbar spine with multilevel disc bulges  with varying degrees of foraminal narrowig, moderate right L4-5.     Pain is causing significant functional limitation resulting in diminished quality of life and impaired age appropriate ADLs.  Denies fever, chills, numbness/tingling, bowel/bladder dysfunction, motor weakness.    Procedural Hx:  - B/l SI Joint injections - 25    I have personally reviewed and/or updated the patient's past medical history, past surgical history, family history, social history, current medications, allergies, and vital signs today.     Review of Systems   Constitutional:  Negative for chills and fever.   HENT:  Negative for ear pain and sore throat.    Eyes:  Negative for pain and visual disturbance.   Respiratory:  Negative for cough and shortness of breath.    Cardiovascular:  Negative for chest pain and palpitations.   Gastrointestinal:  Negative for abdominal pain and vomiting.   Genitourinary:  Negative for dysuria and hematuria.   Musculoskeletal:  Positive for arthralgias, back pain, gait problem and myalgias.   Skin:  Negative for color change and rash.   Neurological:  Negative for seizures and syncope.   All other systems reviewed and are negative.      Problem List[1]    Past Medical History[2]    Past Surgical History[3]    Family History[4]    Social History     Occupational History    Not on file   Tobacco Use    Smoking status: Former     Current packs/day: 0.00     Average packs/day: 1.5 packs/day for 15.0 years (22.5 ttl pk-yrs)     Types: Cigarettes     Start date: 1983     Quit date: 1998     Years since quittin.5    Smokeless tobacco: Never   Vaping Use    Vaping status: Never Used   Substance and Sexual Activity    Alcohol use: Not Currently     Comment: a couple of drinks daily    Drug use: Yes     Frequency: 5.0 times per week     Types: Marijuana    Sexual activity: Not Currently     Partners: Female       Current Outpatient Medications on File Prior to Visit   Medication Sig    albuterol  "(Ventolin HFA) 90 mcg/act inhaler Inhale 2 puffs every 6 (six) hours as needed for wheezing    aspirin (ECOTRIN LOW STRENGTH) 81 mg EC tablet Take 81 mg by mouth in the morning.    celecoxib (CeleBREX) 200 mg capsule Take 1 capsule (200 mg total) by mouth 2 (two) times a day as needed for mild pain    Cholecalciferol (VITAMIN D) 2000 units CAPS Take 2,000 Units by mouth in the morning.    CLOTRIMAZOLE EX Apply topically in the morning and in the evening.    cyclobenzaprine (FLEXERIL) 5 mg tablet Take 1 tablet (5 mg total) by mouth 3 (three) times a day as needed for muscle spasms    dextromethorphan-guaiFENesin (ROBITUSSIN DM)  mg/5 mL syrup Take 5 mL by mouth 3 (three) times a day as needed for cough    esomeprazole (NexIUM) 20 mg capsule Take 20 mg by mouth every other day    fexofenadine (ALLEGRA) 180 MG tablet Take 180 mg by mouth in the morning.    fluticasone (FLONASE) 50 mcg/act nasal spray 2 sprays into each nostril daily    Fluticasone-Salmeterol (Advair Diskus) 250-50 mcg/dose inhaler Inhale 1 puff 2 (two) times a day Rinse mouth after use.    methylPREDNISolone 4 MG tablet therapy pack Use as directed on package    psyllium (METAMUCIL) 58.6 % powder Take 1 packet by mouth in the morning and 1 packet in the evening and 1 packet before bedtime.    ciclopirox (PENLAC) 8 % solution Apply topically daily at bedtime (Patient not taking: Reported on 7/14/2025)    econazole nitrate 1 % cream Apply topically daily (Patient not taking: Reported on 5/1/2025)     No current facility-administered medications on file prior to visit.       Allergies[5]    Physical Exam:    Ht 5' 10\" (1.778 m)   Wt 106 kg (233 lb 11 oz)   BMI 33.53 kg/m²     Constitutional: normal, well developed, well nourished, alert, in no distress and non-toxic and no overt pain behavior.  HEENT: grossly intact  Neck: supple, symmetric, trachea midline and no masses   Pulmonary:even and unlabored  Cardiovascular:No edema or pitting edema " present  Skin:Normal without rashes or lesions and well hydrated  Psychiatric:Mood and affect appropriate  Neurologic:Cranial Nerves II-XII grossly intact      Lumbar Musculoskeletal and Neurologic Exam:                         Inspection: no atrophy of the LE musculature                      Gait: non-antalgic gait                   ROM: limited AROM of the lumbar spine in all planes; FROM at bilateral hips                       Sensation: SILT bilateral L2-S1 dermatomes                      Strength:                                                                  Right     Left  Hip Flexion                     5           5  Knee Extension              5           5  Ankle Dorsiflexion 5 5  Great Toe Extension 5 5  Ankle Plantar Flexion 5 5                              Reflexes: 2+ in bilateral knees and ankles.                          Palpation: +TTP bilateral SI Joint. Diffusely tender over lumbar paraspinals                          Provocative tests:                          Seated Slump negative bilaterally   Copeland's: pos b/l    Sacral Compression/thrust test, GRACIE, and Gaenslen's tests: pos b/l        FADIR: negative bilaterally                            Imaging    MRI Thoracic Spine 4/13/25  FINDINGS:     ALIGNMENT: Mild dextroscoliosis of thoracic spine. No listhesis. No compression fracture.     MARROW SIGNAL: T5, T9, and L1 intraosseous vertebral hemangiomas. Otherwise, normal bone marrow signal.     THORACIC CORD: Normal signal within the thoracic cord.     PARAVERTEBRAL SOFT TISSUES:  Normal.     THORACIC DEGENERATIVE CHANGE: Multilevel endplate osteophytes, small intravertebral herniations, and mild disc height loss. No significant canal stenosis or foraminal narrowing.     OTHER FINDINGS:  None.     IMPRESSION:     No acute abnormality of thoracic spine.     Mild degenerative changes of thoracic spine. No significant canal stenosis or foraminal narrowing.     MRI Lumbar Spine 4/13/25      FINDINGS:     VERTEBRAL BODIES:  There are 5 lumbar type vertebral bodies. Mild levoscoliosis of lumbar spine. No listhesis. No compression fracture.     Type I Modic endplate change at anterior superior corner endplate of L5. Incomplete osseous fusion of the posterior midline elements at L5-S1, normal variant. Otherwise, normal bone marrow signal.     SACRUM: Type I Modic endplate change of anterior superior corner endplate of S1. Otherwise, normal bone marrow signal in visualized sacrum. No evidence of insufficiency or stress fracture. Partially imaged degenerative changes of bilateral sacroiliac   joints with right anterior bridging osteophyte.     DISTAL CORD AND CONUS:  Normal size and signal within the distal cord and conus.     PARASPINAL SOFT TISSUES:  Paraspinal soft tissues are unremarkable.     LOWER THORACIC DISC SPACES: Please see same-day MRI thoracic spine without contrast for further evaluation.     LUMBAR DISC SPACES: Multilevel endplate osteophytes, small intervertebral herniations, mild disc height loss, facet arthropathy.     L1-L2: Disc bulge. No significant canal stenosis or foraminal narrowing.     L2-L3: Disc bulge. Facet arthropathy. No significant canal stenosis or foraminal narrowing.     L3-L4: Disc bulge, right foraminal disc protrusion contacts right L3 exiting nerve. Facet arthropathy. No significant canal stenosis. Mild bilateral foraminal narrowing.     L4-L5: Disc bulge, narrowed bilateral subarticular zones. Facet arthropathy, ligamentum flavum thickening. No significant canal stenosis. Moderate right and mild left foraminal narrowing.     L5-S1: Disc bulge, narrowed right subarticular zone, small posterior annular fissure. Right facet arthropathy. No significant canal stenosis. Mild bilateral foraminal narrowing (right worse than left).     OTHER FINDINGS: Partially imaged colonic diverticulosis.   IMPRESSION:   Multilevel degenerative changes of lumbar spine with varying  degrees of foraminal narrowing (moderate right L4-L5), as detailed above. No significant canal stenosis.        MRI Cervical Spine   ALIGNMENT:  Normal alignment of the cervical spine.  No compression fracture.  No subluxation.  No scoliosis.     MARROW SIGNAL: Mild edema within the right C5 and C6 articular pillars suggesting active facet arthropathy.     CERVICAL AND VISUALIZED THORACIC CORD:  Normal signal within the visualized cord.     PREVERTEBRAL AND PARASPINAL SOFT TISSUES:  Normal.     VISUALIZED POSTERIOR FOSSA:  The visualized posterior fossa demonstrates no abnormal signal. Visualized intracranial vault demonstrates mucosal thickening in the sphenoid sinus.   CERVICAL DISC SPACES:   C2-C3:  Normal.   C3-C4: Right greater than left facet arthropathy with mild right uncovertebral hypertrophy resulting in severe right and moderate left foraminal stenosis. Shallow right central protrusion. No spinal stenosis.   C4-C5: Right greater than left facet arthropathy with bilateral uncovertebral hypertrophy resulting in severe right and moderate left foraminal stenosis. No spinal canal stenosis. Severe right foraminal stenosis.   C5-C6: Right greater than left facet arthropathy with and moderate right greater than left foraminal stenosis. Mild ligamentum flavum thickening. Minimal spinal stenosis.   C6-C7: Posterior disc osteophyte complex with a tiny central disc protrusion with inferior migration. Right greater than left facet arthropathy and uncovertebral hypertrophy without significant foraminal stenosis.   C7-T1:  Normal.   UPPER THORACIC DISC SPACES:  Normal.   IMPRESSION:   Multilevel cervical spondylosis with right greater than left facet arthropathy and uncovertebral hypertrophy resulting in moderate to severe right greater than left foraminal stenosis as described above. Correlate for right greater than left C4   radiculitis.     No cord signal abnormality.         [1]   Patient Active Problem  List  Diagnosis    Cervical pain (neck)    Esophageal reflux    Facial basal cell cancer    Hyperlipidemia    Vitamin D deficiency    Upper respiratory infection    Coronary artery disease involving native coronary artery    Chest pain due to myocardial ischemia    Right lateral epicondylitis    Tinea pedis of both feet    Cervical radiculitis    Essential hypertension    Bronchitis    Chronic bilateral low back pain without sciatica   [2]   Past Medical History:  Diagnosis Date    Allergic     Anxiety     Basal cell carcinoma     Cancer (HCC)     GERD (gastroesophageal reflux disease)     Neck pain on right side    [3]   Past Surgical History:  Procedure Laterality Date    HEMORRHOID SURGERY      AR INJECT SI JOINT ARTHRGRPHY&/ANES/STEROID W/LEEANN Bilateral 6/17/2025    Procedure: Bilateral SI joint injections;  Surgeon: Lizeth Monique MD;  Location:  MAIN OR;  Service: Pain Management    [4]   Family History  Problem Relation Name Age of Onset    Depression Mother Elisha Tolliver     Breast cancer Mother Elisha Tolliver     Cancer Mother Elisha Tolliver     No Known Problems Father      Breast cancer Sister Carmen Tolliver     Cancer Sister Carmen Tolliver    [5]   Allergies  Allergen Reactions    Lamisil Af Defense [Tolnaftate] Hives    Terbinafine Other (See Comments)

## 2025-07-22 ENCOUNTER — APPOINTMENT (OUTPATIENT)
Dept: RADIOLOGY | Facility: HOSPITAL | Age: 60
End: 2025-07-22
Payer: COMMERCIAL

## 2025-07-22 ENCOUNTER — HOSPITAL ENCOUNTER (OUTPATIENT)
Facility: HOSPITAL | Age: 60
Setting detail: OUTPATIENT SURGERY
Discharge: HOME | End: 2025-07-22
Attending: STUDENT IN AN ORGANIZED HEALTH CARE EDUCATION/TRAINING PROGRAM | Admitting: STUDENT IN AN ORGANIZED HEALTH CARE EDUCATION/TRAINING PROGRAM
Payer: COMMERCIAL

## 2025-07-22 VITALS
TEMPERATURE: 97.1 F | SYSTOLIC BLOOD PRESSURE: 162 MMHG | DIASTOLIC BLOOD PRESSURE: 94 MMHG | HEART RATE: 68 BPM | RESPIRATION RATE: 18 BRPM | OXYGEN SATURATION: 99 %

## 2025-07-22 PROCEDURE — 64493 INJ PARAVERT F JNT L/S 1 LEV: CPT | Performed by: STUDENT IN AN ORGANIZED HEALTH CARE EDUCATION/TRAINING PROGRAM

## 2025-07-22 PROCEDURE — 64494 INJ PARAVERT F JNT L/S 2 LEV: CPT | Performed by: STUDENT IN AN ORGANIZED HEALTH CARE EDUCATION/TRAINING PROGRAM

## 2025-07-22 RX ORDER — INDOMETHACIN 25 MG/1
CAPSULE ORAL AS NEEDED
Status: DISCONTINUED | OUTPATIENT
Start: 2025-07-22 | End: 2025-07-22 | Stop reason: HOSPADM

## 2025-07-22 RX ORDER — LIDOCAINE HYDROCHLORIDE 20 MG/ML
INJECTION, SOLUTION EPIDURAL; INFILTRATION; INTRACAUDAL; PERINEURAL AS NEEDED
Status: DISCONTINUED | OUTPATIENT
Start: 2025-07-22 | End: 2025-07-22 | Stop reason: HOSPADM

## 2025-07-22 NOTE — NURSING NOTE
Pt in no distress. Verbalized understanding to AVS and to pain diary. Walked out with spouse in no distress.

## 2025-07-22 NOTE — OP NOTE
OPERATIVE REPORT  PATIENT NAME: Dario Tolliver    :  1965  MRN: [id number]  Pt Location:  OR ROOM 13    SURGERY DATE: 2025    Surgeons and Role:     * Lizeth Monique MD - Primary    Preop Diagnosis:  Lumbar spondylosis [M47.816]    Post-Op Diagnosis Codes:     * Lumbar spondylosis [M47.816]    Procedure(s):  Right - right L3-5 medial branch blocks #1  Indication: Mechanical low back pain  Preoperative diagnosis: 1. Lumbar spondylosis  2 . Low back pain  Postoperative diagnosis: 1. Lumbar spondylosis  2 . Low back pain  Procedure: Fluoroscopically-guided right L3-5 medial Branch Nerve/Dorsal Ramus Blocks using 2% lidocaine  EBL: none  Specimens: not applicable  After discussing the risks, benefits, and alternatives to the procedure, the patient expressed understanding and wished to proceed. The patient was brought to the fluoroscopy suite and placed in the prone position. Procedural pause conducted to verify: correct patient identity, procedure to be performed and as applicable, correct side and site, correct patient position, and availability of implants, special equipment and special requirements. Using fluoroscopy, the junction of the transverse process and superior articulating process of the right L3-5 medial branch levels were identified. The skin was sterilely prepped and draped in the usual fashion using Chloraprep skin prep. Using fluoroscopic guidance, a 3.5 inch 25 gauge spinal needle was advanced to each target. After negative aspiration, 0.5cc of 2% lidocaine was injected at each site and the needles were then removed. The patient tolerated the procedure well and there were no apparent complications. After appropriate observation, the patient was dismissed from the clinic in good condition under their own power. The patient was instructed to keep a pain diary and report the results to our office.     SIGNATURE: Lizeth Monique MD  DATE: 2025  TIME: 12:39 PM

## 2025-07-22 NOTE — DISCHARGE INSTR - AVS FIRST PAGE

## 2025-07-22 NOTE — INTERVAL H&P NOTE
H&P reviewed. After examining the patient I find no changes in the patients condition since the H&P had been written.    Vitals:    07/22/25 1120   BP: 142/81   Pulse: 73   Resp: 18   Temp: (!) 96.5 °F (35.8 °C)   SpO2: 98%

## 2025-07-28 ENCOUNTER — OFFICE VISIT (OUTPATIENT)
Age: 60
End: 2025-07-28
Payer: COMMERCIAL

## 2025-07-28 ENCOUNTER — HOSPITAL ENCOUNTER (OUTPATIENT)
Facility: HOSPITAL | Age: 60
Setting detail: OUTPATIENT SURGERY
End: 2025-07-28
Attending: STUDENT IN AN ORGANIZED HEALTH CARE EDUCATION/TRAINING PROGRAM | Admitting: STUDENT IN AN ORGANIZED HEALTH CARE EDUCATION/TRAINING PROGRAM
Payer: COMMERCIAL

## 2025-07-28 VITALS — BODY MASS INDEX: 32.5 KG/M2 | HEIGHT: 70 IN | WEIGHT: 227 LBS

## 2025-07-28 DIAGNOSIS — M47.816 LUMBAR SPONDYLOSIS: Primary | ICD-10-CM

## 2025-07-28 PROCEDURE — 99214 OFFICE O/P EST MOD 30 MIN: CPT | Performed by: STUDENT IN AN ORGANIZED HEALTH CARE EDUCATION/TRAINING PROGRAM

## 2025-07-30 DIAGNOSIS — J01.90 ACUTE SINUSITIS, RECURRENCE NOT SPECIFIED, UNSPECIFIED LOCATION: ICD-10-CM

## 2025-07-30 DIAGNOSIS — J20.9 ACUTE BRONCHITIS, UNSPECIFIED ORGANISM: ICD-10-CM

## 2025-07-30 RX ORDER — FLUTICASONE PROPIONATE 50 MCG
SPRAY, SUSPENSION (ML) NASAL
Qty: 16 ML | Refills: 1 | Status: SHIPPED | OUTPATIENT
Start: 2025-07-30 | End: 2025-08-07

## 2025-07-30 RX ORDER — FLUTICASONE PROPIONATE AND SALMETEROL 250; 50 UG/1; UG/1
POWDER RESPIRATORY (INHALATION)
Qty: 60 BLISTER | Refills: 1 | Status: SHIPPED | OUTPATIENT
Start: 2025-07-30 | End: 2025-08-07

## 2025-08-07 ENCOUNTER — OFFICE VISIT (OUTPATIENT)
Age: 60
End: 2025-08-07
Payer: COMMERCIAL

## 2025-08-07 VITALS
HEART RATE: 75 BPM | BODY MASS INDEX: 32.58 KG/M2 | TEMPERATURE: 98.5 F | WEIGHT: 227.6 LBS | SYSTOLIC BLOOD PRESSURE: 130 MMHG | DIASTOLIC BLOOD PRESSURE: 86 MMHG | HEIGHT: 70 IN | OXYGEN SATURATION: 99 %

## 2025-08-07 DIAGNOSIS — G89.29 CHRONIC RIGHT-SIDED LOW BACK PAIN WITHOUT SCIATICA: ICD-10-CM

## 2025-08-07 DIAGNOSIS — M54.50 CHRONIC RIGHT-SIDED LOW BACK PAIN WITHOUT SCIATICA: ICD-10-CM

## 2025-08-07 DIAGNOSIS — R31.0 GROSS HEMATURIA: Primary | ICD-10-CM

## 2025-08-07 DIAGNOSIS — R10.9 RIGHT FLANK PAIN: ICD-10-CM

## 2025-08-07 LAB
BILIRUB UR QL STRIP: NEGATIVE
CLARITY UR: CLEAR
COLOR UR: NORMAL
GLUCOSE UR STRIP-MCNC: NEGATIVE MG/DL
HGB UR QL STRIP.AUTO: NEGATIVE
KETONES UR STRIP-MCNC: NEGATIVE MG/DL
LEUKOCYTE ESTERASE UR QL STRIP: NEGATIVE
NITRITE UR QL STRIP: NEGATIVE
PH UR STRIP.AUTO: 6 [PH]
PROT UR STRIP-MCNC: NEGATIVE MG/DL
SP GR UR STRIP.AUTO: 1.02 (ref 1–1.03)
UROBILINOGEN UR STRIP-ACNC: <2 MG/DL

## 2025-08-07 PROCEDURE — 99214 OFFICE O/P EST MOD 30 MIN: CPT | Performed by: FAMILY MEDICINE

## 2025-08-07 RX ORDER — SULFAMETHOXAZOLE AND TRIMETHOPRIM 800; 160 MG/1; MG/1
1 TABLET ORAL 2 TIMES DAILY
Qty: 20 TABLET | Refills: 0 | Status: SHIPPED | OUTPATIENT
Start: 2025-08-07 | End: 2025-08-17

## 2025-08-08 LAB — BACTERIA UR CULT: NORMAL

## 2025-08-14 ENCOUNTER — OFFICE VISIT (OUTPATIENT)
Age: 60
End: 2025-08-14
Payer: COMMERCIAL

## 2025-08-18 ENCOUNTER — OFFICE VISIT (OUTPATIENT)
Age: 60
End: 2025-08-18
Payer: COMMERCIAL

## 2025-08-18 VITALS — HEIGHT: 70 IN | WEIGHT: 224.87 LBS | BODY MASS INDEX: 32.19 KG/M2

## 2025-08-18 DIAGNOSIS — M54.50 CHRONIC BILATERAL LOW BACK PAIN WITHOUT SCIATICA: ICD-10-CM

## 2025-08-18 DIAGNOSIS — M79.18 MYOFASCIAL PAIN SYNDROME: Primary | ICD-10-CM

## 2025-08-18 DIAGNOSIS — G89.29 CHRONIC BILATERAL LOW BACK PAIN WITHOUT SCIATICA: ICD-10-CM

## 2025-08-18 DIAGNOSIS — M47.816 LUMBAR SPONDYLOSIS: ICD-10-CM

## 2025-08-18 PROCEDURE — 99213 OFFICE O/P EST LOW 20 MIN: CPT | Performed by: STUDENT IN AN ORGANIZED HEALTH CARE EDUCATION/TRAINING PROGRAM

## (undated) DEVICE — Device

## (undated) DEVICE — GLOVE PI ULTRA TOUCH SZ.6.5

## (undated) DEVICE — TRAY PAIN SUPPORT

## (undated) DEVICE — NEEDLE SPINAL 22G X 3.5IN  QUINCKE

## (undated) DEVICE — PLASTIC ADHESIVE BANDAGE: Brand: CURITY

## (undated) DEVICE — SKIN MARKER DUAL TIP WITH RULER CAP, FLEXIBLE RULER AND LABELS: Brand: DEVON

## (undated) DEVICE — USED IN CONJUNCTION WITH A SYRINGE AS AN ADDITIVE DEVICE FOR ASPIRATION FROM MULTI-DOSE MEDICINE VIALS OR INJECTION INTO I.V. SYSTEMS AND PRE-SLIT SEPTUMS COVERING INJECTION SITES.: Brand: SOL-M™ BLUNT FILL NEEDLE